# Patient Record
Sex: FEMALE | Race: BLACK OR AFRICAN AMERICAN | NOT HISPANIC OR LATINO | Employment: FULL TIME | URBAN - METROPOLITAN AREA
[De-identification: names, ages, dates, MRNs, and addresses within clinical notes are randomized per-mention and may not be internally consistent; named-entity substitution may affect disease eponyms.]

---

## 2023-02-27 LAB
ALANINE AMINOTRANSFERASE (SGPT) (U/L) IN SER/PLAS: 32 U/L (ref 7–45)
ALBUMIN (G/DL) IN SER/PLAS: 4.6 G/DL (ref 3.4–5)
ALKALINE PHOSPHATASE (U/L) IN SER/PLAS: 56 U/L (ref 33–110)
ANION GAP IN SER/PLAS: 12 MMOL/L (ref 10–20)
ASPARTATE AMINOTRANSFERASE (SGOT) (U/L) IN SER/PLAS: 21 U/L (ref 9–39)
BILIRUBIN TOTAL (MG/DL) IN SER/PLAS: 0.4 MG/DL (ref 0–1.2)
CALCIUM (MG/DL) IN SER/PLAS: 9.8 MG/DL (ref 8.6–10.6)
CARBON DIOXIDE, TOTAL (MMOL/L) IN SER/PLAS: 32 MMOL/L (ref 21–32)
CHLORIDE (MMOL/L) IN SER/PLAS: 103 MMOL/L (ref 98–107)
CHOLESTEROL (MG/DL) IN SER/PLAS: 173 MG/DL (ref 0–199)
CHOLESTEROL IN HDL (MG/DL) IN SER/PLAS: 48.3 MG/DL
CHOLESTEROL/HDL RATIO: 3.6
CREATININE (MG/DL) IN SER/PLAS: 0.71 MG/DL (ref 0.5–1.05)
ERYTHROCYTE DISTRIBUTION WIDTH (RATIO) BY AUTOMATED COUNT: 13.2 % (ref 11.5–14.5)
ERYTHROCYTE MEAN CORPUSCULAR HEMOGLOBIN CONCENTRATION (G/DL) BY AUTOMATED: 31.4 G/DL (ref 32–36)
ERYTHROCYTE MEAN CORPUSCULAR VOLUME (FL) BY AUTOMATED COUNT: 93 FL (ref 80–100)
ERYTHROCYTES (10*6/UL) IN BLOOD BY AUTOMATED COUNT: 4.37 X10E12/L (ref 4–5.2)
ESTIMATED AVERAGE GLUCOSE FOR HBA1C: 103 MG/DL
GFR FEMALE: >90 ML/MIN/1.73M2
GLUCOSE (MG/DL) IN SER/PLAS: 111 MG/DL (ref 74–99)
HEMATOCRIT (%) IN BLOOD BY AUTOMATED COUNT: 40.7 % (ref 36–46)
HEMOGLOBIN (G/DL) IN BLOOD: 12.8 G/DL (ref 12–16)
HEMOGLOBIN A1C/HEMOGLOBIN TOTAL IN BLOOD: 5.2 %
LDL: 96 MG/DL (ref 0–99)
LEUKOCYTES (10*3/UL) IN BLOOD BY AUTOMATED COUNT: 7.5 X10E9/L (ref 4.4–11.3)
NRBC (PER 100 WBCS) BY AUTOMATED COUNT: 0 /100 WBC (ref 0–0)
PLATELETS (10*3/UL) IN BLOOD AUTOMATED COUNT: 351 X10E9/L (ref 150–450)
POTASSIUM (MMOL/L) IN SER/PLAS: 4 MMOL/L (ref 3.5–5.3)
PROTEIN TOTAL: 7.4 G/DL (ref 6.4–8.2)
SODIUM (MMOL/L) IN SER/PLAS: 143 MMOL/L (ref 136–145)
THYROTROPIN (MIU/L) IN SER/PLAS BY DETECTION LIMIT <= 0.05 MIU/L: 2.38 MIU/L (ref 0.44–3.98)
TRIGLYCERIDE (MG/DL) IN SER/PLAS: 145 MG/DL (ref 0–149)
UREA NITROGEN (MG/DL) IN SER/PLAS: 10 MG/DL (ref 6–23)
VLDL: 29 MG/DL (ref 0–40)

## 2023-05-23 ENCOUNTER — OFFICE VISIT (OUTPATIENT)
Dept: PRIMARY CARE | Facility: CLINIC | Age: 43
End: 2023-05-23
Payer: COMMERCIAL

## 2023-05-23 VITALS
BODY MASS INDEX: 50.02 KG/M2 | HEIGHT: 64 IN | HEART RATE: 85 BPM | OXYGEN SATURATION: 98 % | RESPIRATION RATE: 16 BRPM | WEIGHT: 293 LBS | SYSTOLIC BLOOD PRESSURE: 135 MMHG | TEMPERATURE: 98.5 F | DIASTOLIC BLOOD PRESSURE: 77 MMHG

## 2023-05-23 DIAGNOSIS — E66.01 MORBID OBESITY (MULTI): Primary | ICD-10-CM

## 2023-05-23 DIAGNOSIS — M46.1 SACROILIITIS (CMS-HCC): ICD-10-CM

## 2023-05-23 DIAGNOSIS — I10 PRIMARY HYPERTENSION: ICD-10-CM

## 2023-05-23 DIAGNOSIS — E78.49 OTHER HYPERLIPIDEMIA: ICD-10-CM

## 2023-05-23 DIAGNOSIS — I89.0 CHRONIC ACQUIRED LYMPHEDEMA: ICD-10-CM

## 2023-05-23 PROCEDURE — 3075F SYST BP GE 130 - 139MM HG: CPT | Performed by: STUDENT IN AN ORGANIZED HEALTH CARE EDUCATION/TRAINING PROGRAM

## 2023-05-23 PROCEDURE — 3078F DIAST BP <80 MM HG: CPT | Performed by: STUDENT IN AN ORGANIZED HEALTH CARE EDUCATION/TRAINING PROGRAM

## 2023-05-23 PROCEDURE — 99214 OFFICE O/P EST MOD 30 MIN: CPT | Performed by: STUDENT IN AN ORGANIZED HEALTH CARE EDUCATION/TRAINING PROGRAM

## 2023-05-23 RX ORDER — LOSARTAN POTASSIUM 50 MG/1
1 TABLET ORAL DAILY
COMMUNITY
Start: 2021-07-12 | End: 2023-05-23 | Stop reason: SDUPTHER

## 2023-05-23 RX ORDER — SEMAGLUTIDE 0.25 MG/.5ML
0.25 INJECTION, SOLUTION SUBCUTANEOUS
Qty: 2 ML | Refills: 0 | Status: SHIPPED | OUTPATIENT
Start: 2023-05-23 | End: 2023-07-26

## 2023-05-23 RX ORDER — CHLORTHALIDONE 25 MG/1
25 TABLET ORAL DAILY
Qty: 30 TABLET | Refills: 0 | Status: SHIPPED | OUTPATIENT
Start: 2023-05-23 | End: 2023-06-15

## 2023-05-23 RX ORDER — LOSARTAN POTASSIUM 50 MG/1
50 TABLET ORAL DAILY
Qty: 90 TABLET | Refills: 0 | Status: SHIPPED | OUTPATIENT
Start: 2023-05-23 | End: 2023-06-16 | Stop reason: SDUPTHER

## 2023-05-23 RX ORDER — HYDROCHLOROTHIAZIDE 50 MG/1
1 TABLET ORAL DAILY
COMMUNITY
Start: 2021-09-21 | End: 2023-05-23

## 2023-05-23 ASSESSMENT — ENCOUNTER SYMPTOMS
DYSURIA: 0
ACTIVITY CHANGE: 0
NAUSEA: 0
ABDOMINAL PAIN: 0
UNEXPECTED WEIGHT CHANGE: 1
WEAKNESS: 0
WHEEZING: 0
SHORTNESS OF BREATH: 0
VOMITING: 0
NUMBNESS: 0
HEMATURIA: 0
CONSTIPATION: 0
SPEECH DIFFICULTY: 0
COUGH: 0
DIZZINESS: 0
FEVER: 0

## 2023-05-23 NOTE — PROGRESS NOTES
"Subjective   Patient ID: Stan Bee is a 42 y.o. female who presents for Follow-up.  HPI  Ms. Bee is here for a follow-up.  Reports increasing weight gain.  Review of Systems   Constitutional:  Positive for unexpected weight change. Negative for activity change and fever.   HENT:  Negative for congestion.    Respiratory:  Negative for cough, shortness of breath and wheezing.    Cardiovascular:  Positive for leg swelling. Negative for chest pain.   Gastrointestinal:  Negative for abdominal pain, constipation, nausea and vomiting.   Endocrine: Negative for cold intolerance.   Genitourinary:  Negative for dysuria, hematuria and urgency.   Neurological:  Negative for dizziness, speech difficulty, weakness and numbness.   Psychiatric/Behavioral:  Negative for self-injury and suicidal ideas.        Objective   Visit Vitals  /77   Pulse 85   Temp 36.9 °C (98.5 °F)   Resp 16   Ht 1.626 m (5' 4\")   Wt 148 kg (327 lb)   SpO2 98%   BMI 56.13 kg/m²   Smoking Status Never   BSA 2.59 m²      Physical Exam  Constitutional:       Appearance: Normal appearance.   HENT:      Head: Normocephalic and atraumatic.      Nose: Nose normal.      Mouth/Throat:      Mouth: Mucous membranes are moist.   Eyes:      Conjunctiva/sclera: Conjunctivae normal.      Pupils: Pupils are equal, round, and reactive to light.   Cardiovascular:      Rate and Rhythm: Normal rate and regular rhythm.      Pulses: Normal pulses.      Heart sounds: Normal heart sounds.   Pulmonary:      Effort: Pulmonary effort is normal.      Breath sounds: Normal breath sounds.   Musculoskeletal:         General: Normal range of motion.      Cervical back: Neck supple.   Skin:     General: Skin is warm.   Neurological:      General: No focal deficit present.      Mental Status: She is alert and oriented to person, place, and time.   Psychiatric:         Mood and Affect: Mood normal.         Behavior: Behavior normal.         Thought Content: Thought content " normal.         Judgment: Judgment normal.         Assessment/Plan          Problem List Items Addressed This Visit          Other    Other hyperlipidemia    Overview     The 10-year ASCVD risk score (Rubi RUIZ, et al., 2019) is: 2.9%    Values used to calculate the score:      Age: 42 years      Sex: Female      Is Non- : Yes      Diabetic: No      Tobacco smoker: No      Systolic Blood Pressure: 135 mmHg      Is BP treated: Yes      HDL Cholesterol: 48.3 mg/dL      Total Cholesterol: 173 mg/dL          Other Visit Diagnoses       Morbid obesity (CMS/HCC)    -  Primary    Relevant Medications    semaglutide, weight loss, (Wegovy) 0.25 mg/0.5 mL pen injector    Chronic acquired lymphedema        Relevant Orders    Referral to Physical Therapy    Primary hypertension        Relevant Medications    losartan (Cozaar) 50 mg tablet    chlorthalidone (Hygroton) 25 mg tablet    Sacroiliitis (CMS/HCC)        Relevant Orders    Referral to Physical Therapy    Vitamin B12           Patient's blood pressure is within normal limits.  Advised to continue losartan 50 mg and chlorthalidone 25 mg  Advise lifestyle modification.  Patient would like to pursue pharmacological options.  Discussed GLP-1 agonist, Adipex etc. would like to go ahead with GLP-1 agonist-Wegovy.  Benefits and risks including medullary thyroid cancer, acute pancreatitis and kidney injury discussed.  Verbalizes understanding and would like to proceed.  We will send in medications.  Also complains of increasing back pain.  X-ray shows sacroiliitis-advise physical therapy.  Agreeable to plan.

## 2023-06-05 DIAGNOSIS — J30.2 SEASONAL ALLERGIES: ICD-10-CM

## 2023-06-05 DIAGNOSIS — E66.01 MORBID OBESITY (MULTI): Primary | ICD-10-CM

## 2023-06-09 DIAGNOSIS — R53.83 OTHER FATIGUE: Primary | ICD-10-CM

## 2023-06-09 PROBLEM — E78.49 OTHER HYPERLIPIDEMIA: Status: ACTIVE | Noted: 2023-06-09

## 2023-06-09 RX ORDER — CETIRIZINE HYDROCHLORIDE 10 MG/1
10 TABLET ORAL DAILY
Qty: 30 TABLET | Refills: 1 | Status: SHIPPED | OUTPATIENT
Start: 2023-06-09 | End: 2023-07-03

## 2023-06-09 RX ORDER — DULAGLUTIDE 0.75 MG/.5ML
0.75 INJECTION, SOLUTION SUBCUTANEOUS
Qty: 2 ML | Refills: 1 | Status: SHIPPED | OUTPATIENT
Start: 2023-06-09 | End: 2023-07-03 | Stop reason: ALTCHOICE

## 2023-06-13 LAB
ALANINE AMINOTRANSFERASE (SGPT) (U/L) IN SER/PLAS: 42 U/L (ref 7–45)
ALBUMIN (G/DL) IN SER/PLAS: 4.5 G/DL (ref 3.4–5)
ALKALINE PHOSPHATASE (U/L) IN SER/PLAS: 48 U/L (ref 33–110)
ANION GAP IN SER/PLAS: 11 MMOL/L (ref 10–20)
ASPARTATE AMINOTRANSFERASE (SGOT) (U/L) IN SER/PLAS: 21 U/L (ref 9–39)
BILIRUBIN TOTAL (MG/DL) IN SER/PLAS: 0.3 MG/DL (ref 0–1.2)
CALCIUM (MG/DL) IN SER/PLAS: 10.2 MG/DL (ref 8.6–10.3)
CARBON DIOXIDE, TOTAL (MMOL/L) IN SER/PLAS: 34 MMOL/L (ref 21–32)
CHLORIDE (MMOL/L) IN SER/PLAS: 99 MMOL/L (ref 98–107)
CHOLESTEROL (MG/DL) IN SER/PLAS: 200 MG/DL (ref 0–199)
CHOLESTEROL IN HDL (MG/DL) IN SER/PLAS: 63.9 MG/DL
CHOLESTEROL/HDL RATIO: 3.1
CREATININE (MG/DL) IN SER/PLAS: 0.79 MG/DL (ref 0.5–1.05)
ERYTHROCYTE DISTRIBUTION WIDTH (RATIO) BY AUTOMATED COUNT: 13.3 % (ref 11.5–14.5)
ERYTHROCYTE MEAN CORPUSCULAR HEMOGLOBIN CONCENTRATION (G/DL) BY AUTOMATED: 31.9 G/DL (ref 32–36)
ERYTHROCYTE MEAN CORPUSCULAR VOLUME (FL) BY AUTOMATED COUNT: 94 FL (ref 80–100)
ERYTHROCYTES (10*6/UL) IN BLOOD BY AUTOMATED COUNT: 4.32 X10E12/L (ref 4–5.2)
GFR FEMALE: >90 ML/MIN/1.73M2
GLUCOSE (MG/DL) IN SER/PLAS: 91 MG/DL (ref 74–99)
HEMATOCRIT (%) IN BLOOD BY AUTOMATED COUNT: 40.5 % (ref 36–46)
HEMOGLOBIN (G/DL) IN BLOOD: 12.9 G/DL (ref 12–16)
LDL: 114 MG/DL (ref 0–99)
LEUKOCYTES (10*3/UL) IN BLOOD BY AUTOMATED COUNT: 9 X10E9/L (ref 4.4–11.3)
NRBC (PER 100 WBCS) BY AUTOMATED COUNT: 0 /100 WBC (ref 0–0)
PLATELETS (10*3/UL) IN BLOOD AUTOMATED COUNT: 325 X10E9/L (ref 150–450)
POTASSIUM (MMOL/L) IN SER/PLAS: 3.3 MMOL/L (ref 3.5–5.3)
PROTEIN TOTAL: 7.9 G/DL (ref 6.4–8.2)
SODIUM (MMOL/L) IN SER/PLAS: 141 MMOL/L (ref 136–145)
THYROTROPIN (MIU/L) IN SER/PLAS BY DETECTION LIMIT <= 0.05 MIU/L: 3.37 MIU/L (ref 0.44–3.98)
TRIGLYCERIDE (MG/DL) IN SER/PLAS: 110 MG/DL (ref 0–149)
UREA NITROGEN (MG/DL) IN SER/PLAS: 16 MG/DL (ref 6–23)
VLDL: 22 MG/DL (ref 0–40)

## 2023-06-15 DIAGNOSIS — I10 PRIMARY HYPERTENSION: ICD-10-CM

## 2023-06-15 RX ORDER — CHLORTHALIDONE 25 MG/1
TABLET ORAL
Qty: 30 TABLET | Refills: 0 | Status: SHIPPED | OUTPATIENT
Start: 2023-06-15 | End: 2023-06-16 | Stop reason: SINTOL

## 2023-06-16 ENCOUNTER — TELEPHONE (OUTPATIENT)
Dept: PRIMARY CARE | Facility: CLINIC | Age: 43
End: 2023-06-16
Payer: COMMERCIAL

## 2023-06-16 DIAGNOSIS — I10 PRIMARY HYPERTENSION: ICD-10-CM

## 2023-06-16 RX ORDER — LOSARTAN POTASSIUM 50 MG/1
50 TABLET ORAL 2 TIMES DAILY
Qty: 180 TABLET | Refills: 0 | Status: SHIPPED | OUTPATIENT
Start: 2023-06-16 | End: 2023-06-27 | Stop reason: SDUPTHER

## 2023-06-16 RX ORDER — CHLORTHALIDONE 25 MG/1
12.5 TABLET ORAL DAILY
Qty: 30 TABLET | Refills: 1 | Status: SHIPPED | OUTPATIENT
Start: 2023-06-16 | End: 2023-07-03 | Stop reason: SDUPTHER

## 2023-06-16 RX ORDER — POTASSIUM CHLORIDE 750 MG/1
10 TABLET, FILM COATED, EXTENDED RELEASE ORAL DAILY
Qty: 10 TABLET | Refills: 0 | Status: SHIPPED | OUTPATIENT
Start: 2023-06-16 | End: 2023-07-03 | Stop reason: SDUPTHER

## 2023-06-16 NOTE — TELEPHONE ENCOUNTER
Result Communication    Resulted Orders   Lipid Panel   Result Value Ref Range    Cholesterol 200 (H) 0 - 199 mg/dL      Comment:      .      AGE      DESIRABLE   BORDERLINE HIGH   HIGH     0-19 Y     0 - 169       170 - 199     >/= 200    20-24 Y     0 - 189       190 - 224     >/= 225         >24 Y     0 - 199       200 - 239     >/= 240   **All ranges are based on fasting samples. Specific   therapeutic targets will vary based on patient-specific   cardiac risk.  .   Pediatric guidelines reference:Pediatrics 2011, 128(S5).   Adult guidelines reference: NCEP ATPIII Guidelines,     SOLO 2001, 258:2486-97  .   Venipuncture immediately after or during the    administration of Metamizole may lead to falsely   low results. Testing should be performed immediately   prior to Metamizole dosing.      HDL 63.9 mg/dL      Comment:      .      AGE      VERY LOW   LOW     NORMAL    HIGH       0-19 Y       < 35   < 40     40-45     ----    20-24 Y       ----   < 40       >45     ----      >24 Y       ----   < 40     40-60      >60  .      Cholesterol/HDL Ratio 3.1       Comment:      REF VALUES  DESIRABLE  < 3.4  HIGH RISK  > 5.0       (H) 0 - 99 mg/dL      Comment:      .                           NEAR      BORD      AGE      DESIRABLE  OPTIMAL    HIGH     HIGH     VERY HIGH     0-19 Y     0 - 109     ---    110-129   >/= 130     ----    20-24 Y     0 - 119     ---    120-159   >/= 160     ----      >24 Y     0 -  99   100-129  130-159   160-189     >/=190  .      VLDL 22 0 - 40 mg/dL    Triglycerides 110 0 - 149 mg/dL      Comment:      .      AGE      DESIRABLE   BORDERLINE HIGH   HIGH     VERY HIGH   0 D-90 D    19 - 174         ----         ----        ----  91 D- 9 Y     0 -  74        75 -  99     >/= 100      ----    10-19 Y     0 -  89        90 - 129     >/= 130      ----    20-24 Y     0 - 114       115 - 149     >/= 150      ----         >24 Y     0 - 149       150 - 199    200- 499    >/= 500  .    Venipuncture immediately after or during the    administration of Metamizole may lead to falsely   low results. Testing should be performed immediately   prior to Metamizole dosing.     TSH with reflex to Free T4 if abnormal   Result Value Ref Range    TSH 3.37 0.44 - 3.98 mIU/L      Comment:       TSH testing is performed using different testing    methodology at [facility] than at other    [facility]. Direct result comparisons should    only be made within the same method.     Comprehensive Metabolic Panel   Result Value Ref Range    Glucose 91 74 - 99 mg/dL    Sodium 141 136 - 145 mmol/L    Potassium 3.3 (L) 3.5 - 5.3 mmol/L    Chloride 99 98 - 107 mmol/L    Bicarbonate 34 (H) 21 - 32 mmol/L    Anion Gap 11 10 - 20 mmol/L    Urea Nitrogen 16 6 - 23 mg/dL    Creatinine 0.79 0.50 - 1.05 mg/dL    GFR Female >90 >90 mL/min/1.73m2      Comment:       CALCULATIONS OF ESTIMATED GFR ARE PERFORMED   USING THE 2021 CKD-EPI STUDY REFIT EQUATION   WITHOUT THE RACE VARIABLE FOR THE IDMS-TRACEABLE   CREATININE METHODS.    https://jasn.asnjournals.org/content/early/2021/09/22/ASN.8564292096      Calcium 10.2 8.6 - 10.3 mg/dL    Albumin 4.5 3.4 - 5.0 g/dL    Alkaline Phosphatase 48 33 - 110 U/L    Total Protein 7.9 6.4 - 8.2 g/dL    AST 21 9 - 39 U/L    Total Bilirubin 0.3 0.0 - 1.2 mg/dL    ALT (SGPT) 42 7 - 45 U/L      Comment:       Patients treated with Sulfasalazine may generate    falsely decreased results for ALT.     CBC   Result Value Ref Range    WBC 9.0 4.4 - 11.3 x10E9/L    nRBC 0.0 0.0 - 0.0 /100 WBC    RBC 4.32 4.00 - 5.20 x10E12/L    Hemoglobin 12.9 12.0 - 16.0 g/dL    Hematocrit 40.5 36.0 - 46.0 %    MCV 94 80 - 100 fL    MCHC 31.9 (L) 32.0 - 36.0 g/dL    Platelets 325 150 - 450 x10E9/L    RDW 13.3 11.5 - 14.5 %       1:04 PM      The 10-year ASCVD risk score (Rubi RUIZ, et al., 2019) is: 1.9%    Values used to calculate the score:      Age: 42 years      Sex: Female      Is Non-  : Yes      Diabetic: No      Tobacco smoker: No      Systolic Blood Pressure: 135 mmHg      Is BP treated: Yes      HDL Cholesterol: 63.9 mg/dL      Total Cholesterol: 200 mg/dL         Change in BP recommended    Losartan 50 mg twice daily   Chlorthalidone 12.5 mg daily   Potassium supplement for 10 days

## 2023-06-27 DIAGNOSIS — I10 PRIMARY HYPERTENSION: ICD-10-CM

## 2023-06-27 RX ORDER — LOSARTAN POTASSIUM 50 MG/1
50 TABLET ORAL 2 TIMES DAILY
Qty: 180 TABLET | Refills: 0 | Status: SHIPPED | OUTPATIENT
Start: 2023-06-27 | End: 2023-09-01 | Stop reason: SDUPTHER

## 2023-06-28 ENCOUNTER — CLINICAL SUPPORT (OUTPATIENT)
Dept: PRIMARY CARE | Facility: CLINIC | Age: 43
End: 2023-06-28
Payer: COMMERCIAL

## 2023-06-28 VITALS
BODY MASS INDEX: 55.96 KG/M2 | SYSTOLIC BLOOD PRESSURE: 160 MMHG | HEART RATE: 67 BPM | WEIGHT: 293 LBS | DIASTOLIC BLOOD PRESSURE: 90 MMHG

## 2023-07-03 DIAGNOSIS — E66.01 MORBID OBESITY (MULTI): ICD-10-CM

## 2023-07-03 DIAGNOSIS — I10 PRIMARY HYPERTENSION: ICD-10-CM

## 2023-07-03 DIAGNOSIS — J30.2 SEASONAL ALLERGIES: ICD-10-CM

## 2023-07-03 RX ORDER — POTASSIUM CHLORIDE 750 MG/1
10 TABLET, FILM COATED, EXTENDED RELEASE ORAL DAILY
Qty: 30 TABLET | Refills: 2 | Status: SHIPPED | OUTPATIENT
Start: 2023-07-03 | End: 2023-07-27

## 2023-07-03 RX ORDER — CHLORTHALIDONE 25 MG/1
25 TABLET ORAL DAILY
Qty: 30 TABLET | Refills: 2 | Status: SHIPPED | OUTPATIENT
Start: 2023-07-03 | End: 2023-07-11

## 2023-07-03 RX ORDER — CETIRIZINE HYDROCHLORIDE 10 MG/1
TABLET ORAL
Qty: 30 TABLET | Refills: 1 | Status: SHIPPED | OUTPATIENT
Start: 2023-07-03 | End: 2023-07-27

## 2023-07-03 RX ORDER — DULAGLUTIDE 1.5 MG/.5ML
1.5 INJECTION, SOLUTION SUBCUTANEOUS
Qty: 2 ML | Refills: 1 | Status: SHIPPED | OUTPATIENT
Start: 2023-07-03 | End: 2023-07-26 | Stop reason: ALTCHOICE

## 2023-07-03 NOTE — PROGRESS NOTES
Reports swelling back up     Losartan 50 twice daily   Chlorthalidone 25 mg daily  Potassium 10 daily   Trulicity 1.5 mg weekly    BMP in 10 days

## 2023-07-09 DIAGNOSIS — I10 PRIMARY HYPERTENSION: ICD-10-CM

## 2023-07-11 RX ORDER — CHLORTHALIDONE 25 MG/1
TABLET ORAL
Qty: 15 TABLET | Refills: 3 | Status: SHIPPED | OUTPATIENT
Start: 2023-07-11 | End: 2023-09-01 | Stop reason: SDUPTHER

## 2023-07-26 ENCOUNTER — LAB (OUTPATIENT)
Dept: LAB | Facility: LAB | Age: 43
End: 2023-07-26
Payer: COMMERCIAL

## 2023-07-26 DIAGNOSIS — R53.83 OTHER FATIGUE: ICD-10-CM

## 2023-07-26 DIAGNOSIS — E66.01 MORBID OBESITY (MULTI): ICD-10-CM

## 2023-07-26 DIAGNOSIS — M46.1 SACROILIITIS (CMS-HCC): ICD-10-CM

## 2023-07-26 DIAGNOSIS — I10 PRIMARY HYPERTENSION: ICD-10-CM

## 2023-07-26 LAB
ACTIVATED PARTIAL THROMBOPLASTIN TIME IN PPP BY COAGULATION ASSAY: NORMAL
ALANINE AMINOTRANSFERASE (SGPT) (U/L) IN SER/PLAS: 34 U/L (ref 7–45)
ALBUMIN (G/DL) IN SER/PLAS: 4.9 G/DL (ref 3.4–5)
ALKALINE PHOSPHATASE (U/L) IN SER/PLAS: 50 U/L (ref 33–110)
AMPHETAMINE (PRESENCE) IN URINE BY SCREEN METHOD: NORMAL
ANION GAP IN SER/PLAS: 16 MMOL/L (ref 10–20)
ANION GAP IN SER/PLAS: 16 MMOL/L (ref 10–20)
ASPARTATE AMINOTRANSFERASE (SGOT) (U/L) IN SER/PLAS: 25 U/L (ref 9–39)
BARBITURATES PRESENCE IN URINE BY SCREEN METHOD: NORMAL
BASOPHILS (10*3/UL) IN BLOOD BY AUTOMATED COUNT: 0.1 X10E9/L (ref 0–0.1)
BASOPHILS/100 LEUKOCYTES IN BLOOD BY AUTOMATED COUNT: 1.2 % (ref 0–2)
BENZODIAZEPINE (PRESENCE) IN URINE BY SCREEN METHOD: NORMAL
BILIRUBIN TOTAL (MG/DL) IN SER/PLAS: 0.7 MG/DL (ref 0–1.2)
C PEPTIDE (NG/ML) IN SER/PLAS: 5.6 NG/ML (ref 0.7–3.9)
CALCIDIOL (25 OH VITAMIN D3) (NG/ML) IN SER/PLAS: 12 NG/ML
CALCIUM (MG/DL) IN SER/PLAS: 10 MG/DL (ref 8.6–10.6)
CALCIUM (MG/DL) IN SER/PLAS: 10.1 MG/DL (ref 8.6–10.6)
CANNABINOIDS IN URINE BY SCREEN METHOD: NORMAL
CARBON DIOXIDE, TOTAL (MMOL/L) IN SER/PLAS: 28 MMOL/L (ref 21–32)
CARBON DIOXIDE, TOTAL (MMOL/L) IN SER/PLAS: 28 MMOL/L (ref 21–32)
CHLORIDE (MMOL/L) IN SER/PLAS: 100 MMOL/L (ref 98–107)
CHLORIDE (MMOL/L) IN SER/PLAS: 100 MMOL/L (ref 98–107)
CHOLESTEROL (MG/DL) IN SER/PLAS: 163 MG/DL (ref 0–199)
CHOLESTEROL IN HDL (MG/DL) IN SER/PLAS: 47.4 MG/DL
CHOLESTEROL/HDL RATIO: 3.4
COBALAMIN (VITAMIN B12) (PG/ML) IN SER/PLAS: 706 PG/ML (ref 211–911)
COCAINE (PRESENCE) IN URINE BY SCREEN METHOD: NORMAL
CREATININE (MG/DL) IN SER/PLAS: 0.75 MG/DL (ref 0.5–1.05)
CREATININE (MG/DL) IN SER/PLAS: 0.75 MG/DL (ref 0.5–1.05)
DRUG SCREEN COMMENT URINE: NORMAL
EOSINOPHILS (10*3/UL) IN BLOOD BY AUTOMATED COUNT: 0.44 X10E9/L (ref 0–0.7)
EOSINOPHILS/100 LEUKOCYTES IN BLOOD BY AUTOMATED COUNT: 5.1 % (ref 0–6)
ERYTHROCYTE DISTRIBUTION WIDTH (RATIO) BY AUTOMATED COUNT: 12.7 % (ref 11.5–14.5)
ERYTHROCYTE MEAN CORPUSCULAR HEMOGLOBIN CONCENTRATION (G/DL) BY AUTOMATED: 33.5 G/DL (ref 32–36)
ERYTHROCYTE MEAN CORPUSCULAR VOLUME (FL) BY AUTOMATED COUNT: 89 FL (ref 80–100)
ERYTHROCYTES (10*6/UL) IN BLOOD BY AUTOMATED COUNT: 4.74 X10E12/L (ref 4–5.2)
ESTIMATED AVERAGE GLUCOSE FOR HBA1C: 100 MG/DL
FENTANYL URINE: NORMAL
FERRITIN (UG/LL) IN SER/PLAS: 267 UG/L (ref 8–150)
FOLATE (NG/ML) IN SER/PLAS: >24 NG/ML
GFR FEMALE: >90 ML/MIN/1.73M2
GFR FEMALE: >90 ML/MIN/1.73M2
GLUCOSE (MG/DL) IN SER/PLAS: 90 MG/DL (ref 74–99)
GLUCOSE (MG/DL) IN SER/PLAS: 91 MG/DL (ref 74–99)
HEMATOCRIT (%) IN BLOOD BY AUTOMATED COUNT: 42.1 % (ref 36–46)
HEMOGLOBIN (G/DL) IN BLOOD: 14.1 G/DL (ref 12–16)
HEMOGLOBIN A1C/HEMOGLOBIN TOTAL IN BLOOD: 5.1 %
IMMATURE GRANULOCYTES/100 LEUKOCYTES IN BLOOD BY AUTOMATED COUNT: 0.2 % (ref 0–0.9)
INR IN PPP BY COAGULATION ASSAY: NORMAL
IRON (UG/DL) IN SER/PLAS: 99 UG/DL (ref 35–150)
IRON BINDING CAPACITY (UG/DL) IN SER/PLAS: 400 UG/DL (ref 240–445)
IRON SATURATION (%) IN SER/PLAS: 25 % (ref 25–45)
LDL: 94 MG/DL (ref 0–99)
LEUKOCYTES (10*3/UL) IN BLOOD BY AUTOMATED COUNT: 8.6 X10E9/L (ref 4.4–11.3)
LYMPHOCYTES (10*3/UL) IN BLOOD BY AUTOMATED COUNT: 3.88 X10E9/L (ref 1.2–4.8)
LYMPHOCYTES/100 LEUKOCYTES IN BLOOD BY AUTOMATED COUNT: 45.1 % (ref 13–44)
METHADONE (PRESENCE) IN URINE BY SCREEN METHOD: NORMAL
MONOCYTES (10*3/UL) IN BLOOD BY AUTOMATED COUNT: 0.71 X10E9/L (ref 0.1–1)
MONOCYTES/100 LEUKOCYTES IN BLOOD BY AUTOMATED COUNT: 8.2 % (ref 2–10)
NEUTROPHILS (10*3/UL) IN BLOOD BY AUTOMATED COUNT: 3.46 X10E9/L (ref 1.2–7.7)
NEUTROPHILS/100 LEUKOCYTES IN BLOOD BY AUTOMATED COUNT: 40.2 % (ref 40–80)
NRBC (PER 100 WBCS) BY AUTOMATED COUNT: 0 /100 WBC (ref 0–0)
OPIATES (PRESENCE) IN URINE BY SCREEN METHOD: NORMAL
OXYCODONE (PRESENCE) IN URINE BY SCREEN METHOD: NORMAL
PARATHYRIN INTACT (PG/ML) IN SER/PLAS: 82.8 PG/ML (ref 18.5–88)
PHENCYCLIDINE (PRESENCE) IN URINE BY SCREEN METHOD: NORMAL
PLATELETS (10*3/UL) IN BLOOD AUTOMATED COUNT: 313 X10E9/L (ref 150–450)
POTASSIUM (MMOL/L) IN SER/PLAS: 3.1 MMOL/L (ref 3.5–5.3)
POTASSIUM (MMOL/L) IN SER/PLAS: 3.1 MMOL/L (ref 3.5–5.3)
PROTEIN TOTAL: 8.4 G/DL (ref 6.4–8.2)
PROTHROMBIN TIME (PT) IN PPP BY COAGULATION ASSAY: NORMAL
SODIUM (MMOL/L) IN SER/PLAS: 141 MMOL/L (ref 136–145)
SODIUM (MMOL/L) IN SER/PLAS: 141 MMOL/L (ref 136–145)
THYROTROPIN (MIU/L) IN SER/PLAS BY DETECTION LIMIT <= 0.05 MIU/L: 1.56 MIU/L (ref 0.44–3.98)
TRIGLYCERIDE (MG/DL) IN SER/PLAS: 106 MG/DL (ref 0–149)
UREA NITROGEN (MG/DL) IN SER/PLAS: 14 MG/DL (ref 6–23)
UREA NITROGEN (MG/DL) IN SER/PLAS: 15 MG/DL (ref 6–23)
VLDL: 21 MG/DL (ref 0–40)

## 2023-07-26 PROCEDURE — 83550 IRON BINDING TEST: CPT

## 2023-07-26 PROCEDURE — 82306 VITAMIN D 25 HYDROXY: CPT

## 2023-07-26 PROCEDURE — 82728 ASSAY OF FERRITIN: CPT

## 2023-07-26 PROCEDURE — 80048 BASIC METABOLIC PNL TOTAL CA: CPT

## 2023-07-26 PROCEDURE — 83540 ASSAY OF IRON: CPT

## 2023-07-26 PROCEDURE — 82607 VITAMIN B-12: CPT

## 2023-07-26 PROCEDURE — 36415 COLL VENOUS BLD VENIPUNCTURE: CPT

## 2023-07-27 DIAGNOSIS — I10 PRIMARY HYPERTENSION: ICD-10-CM

## 2023-07-27 DIAGNOSIS — J30.2 SEASONAL ALLERGIES: ICD-10-CM

## 2023-07-27 LAB
ACTIVATED PARTIAL THROMBOPLASTIN TIME IN PPP BY COAGULATION ASSAY: 33 SEC (ref 27–38)
INR IN PPP BY COAGULATION ASSAY: 1.1 (ref 0.9–1.1)
PROTHROMBIN TIME (PT) IN PPP BY COAGULATION ASSAY: 12.5 SEC (ref 9.8–12.8)

## 2023-07-27 RX ORDER — CETIRIZINE HYDROCHLORIDE 10 MG/1
TABLET ORAL
Qty: 30 TABLET | Refills: 1 | Status: SHIPPED | OUTPATIENT
Start: 2023-07-27 | End: 2023-08-21

## 2023-07-27 RX ORDER — POTASSIUM CHLORIDE 750 MG/1
20 TABLET, FILM COATED, EXTENDED RELEASE ORAL DAILY
Qty: 30 TABLET | Refills: 0 | Status: SHIPPED | OUTPATIENT
Start: 2023-07-27 | End: 2023-08-07

## 2023-07-29 LAB
COPPER: 155.7 UG/DL (ref 80–155)
ZINC,SERUM OR PLASMA: 86.3 UG/DL (ref 60–120)

## 2023-07-31 LAB
COTININE BLOOD QUANTITATIVE: 81 NG/ML
NICOTINE BLOOD QUANTITATIVE: 7 NG/ML
VITAMIN B1, WHOLE BLOOD: 127 NMOL/L (ref 70–180)

## 2023-08-03 ENCOUNTER — TELEPHONE (OUTPATIENT)
Dept: PRIMARY CARE | Facility: CLINIC | Age: 43
End: 2023-08-03
Payer: COMMERCIAL

## 2023-08-03 NOTE — TELEPHONE ENCOUNTER
Per pharmacy patient has not filled statin therapy at [facility] in the past 180 days , pharmacy is requesting a new prescription for statin therapy  if appropriate

## 2023-08-06 DIAGNOSIS — I10 PRIMARY HYPERTENSION: ICD-10-CM

## 2023-08-07 RX ORDER — POTASSIUM CHLORIDE 750 MG/1
10 TABLET, FILM COATED, EXTENDED RELEASE ORAL DAILY
Qty: 30 TABLET | Refills: 1 | Status: SHIPPED | OUTPATIENT
Start: 2023-08-07 | End: 2023-09-01 | Stop reason: SDUPTHER

## 2023-08-21 DIAGNOSIS — J30.2 SEASONAL ALLERGIES: ICD-10-CM

## 2023-08-21 RX ORDER — CETIRIZINE HYDROCHLORIDE 10 MG/1
TABLET ORAL
Qty: 30 TABLET | Refills: 1 | Status: SHIPPED | OUTPATIENT
Start: 2023-08-21 | End: 2023-09-12

## 2023-08-28 PROBLEM — L03.115 CELLULITIS OF FOOT, RIGHT: Status: ACTIVE | Noted: 2023-08-28

## 2023-08-28 PROBLEM — S16.1XXA CERVICAL STRAIN: Status: ACTIVE | Noted: 2023-08-28

## 2023-08-28 PROBLEM — M54.50 LOW BACK PAIN: Status: ACTIVE | Noted: 2023-08-28

## 2023-08-28 PROBLEM — R10.819 LOWER ABDOMINAL TENDERNESS: Status: ACTIVE | Noted: 2023-08-28

## 2023-08-28 PROBLEM — J34.89 SINUS PRESSURE: Status: ACTIVE | Noted: 2023-08-28

## 2023-08-28 PROBLEM — M72.2 PLANTAR FASCIITIS, BILATERAL: Status: ACTIVE | Noted: 2023-08-28

## 2023-08-28 PROBLEM — Z78.9 KNOWN MEDICAL PROBLEMS: Status: ACTIVE | Noted: 2023-08-28

## 2023-08-28 PROBLEM — M79.2 NERVE PAIN: Status: ACTIVE | Noted: 2023-08-28

## 2023-08-28 PROBLEM — M79.673 FOOT PAIN: Status: ACTIVE | Noted: 2023-08-28

## 2023-08-28 PROBLEM — R21 RASH AND OTHER NONSPECIFIC SKIN ERUPTION: Status: ACTIVE | Noted: 2021-07-28

## 2023-08-28 PROBLEM — J45.909 ASTHMA (HHS-HCC): Status: ACTIVE | Noted: 2023-08-28

## 2023-08-28 PROBLEM — M79.89 LEG SWELLING: Status: ACTIVE | Noted: 2023-08-28

## 2023-08-28 PROBLEM — M94.0 COSTOCHONDRITIS: Status: ACTIVE | Noted: 2023-08-28

## 2023-08-28 PROBLEM — S10.91XA ABRASION OF NECK: Status: ACTIVE | Noted: 2023-08-28

## 2023-08-28 PROBLEM — N92.0 SPOTTING: Status: ACTIVE | Noted: 2023-08-28

## 2023-08-28 PROBLEM — S46.912A LEFT SHOULDER STRAIN: Status: ACTIVE | Noted: 2023-08-28

## 2023-08-28 PROBLEM — R07.89 ATYPICAL CHEST PAIN: Status: ACTIVE | Noted: 2023-08-28

## 2023-08-28 PROBLEM — N89.8 VAGINAL ITCHING: Status: ACTIVE | Noted: 2023-08-28

## 2023-08-28 PROBLEM — R60.0 PEDAL EDEMA: Status: ACTIVE | Noted: 2023-08-28

## 2023-08-28 PROBLEM — Z72.89 CURRENT VAPING ON SOME DAYS: Status: ACTIVE | Noted: 2023-08-28

## 2023-08-28 PROBLEM — R53.83 FATIGUE: Status: ACTIVE | Noted: 2023-08-28

## 2023-08-28 PROBLEM — B37.49 YEAST UTI: Status: ACTIVE | Noted: 2023-08-28

## 2023-08-28 PROBLEM — N89.8 VAGINAL DISCHARGE: Status: ACTIVE | Noted: 2023-08-28

## 2023-08-28 PROBLEM — M25.511 CHRONIC RIGHT SHOULDER PAIN: Status: ACTIVE | Noted: 2018-06-13

## 2023-08-28 PROBLEM — K04.7 DENTOALVEOLAR ABSCESS: Status: ACTIVE | Noted: 2023-08-28

## 2023-08-28 PROBLEM — M76.61 ACHILLES TENDINITIS OF BOTH LOWER EXTREMITIES: Status: ACTIVE | Noted: 2023-08-28

## 2023-08-28 PROBLEM — Z20.2 TRICHOMONAS EXPOSURE: Status: ACTIVE | Noted: 2023-08-28

## 2023-08-28 PROBLEM — D50.9 IRON DEFICIENCY ANEMIA: Status: ACTIVE | Noted: 2023-08-28

## 2023-08-28 PROBLEM — L02.31 ABSCESS OF BUTTOCK, LEFT: Status: ACTIVE | Noted: 2023-08-28

## 2023-08-28 PROBLEM — S89.91XA INJURY OF RIGHT LEG: Status: ACTIVE | Noted: 2023-08-28

## 2023-08-28 PROBLEM — R51.9 FRONTAL HEADACHE: Status: ACTIVE | Noted: 2023-08-28

## 2023-08-28 PROBLEM — C84.A0 CUTANEOUS T-CELL LYMPHOMA (MULTI): Status: ACTIVE | Noted: 2023-08-28

## 2023-08-28 PROBLEM — M54.10 RADICULOPATHY: Status: ACTIVE | Noted: 2023-08-28

## 2023-08-28 PROBLEM — Z91.148 HISTORY OF MEDICATION NONCOMPLIANCE: Status: ACTIVE | Noted: 2023-08-28

## 2023-08-28 PROBLEM — N62 MACROMASTIA: Status: ACTIVE | Noted: 2023-08-28

## 2023-08-28 PROBLEM — B37.31 VAGINAL CANDIDIASIS: Status: ACTIVE | Noted: 2023-08-28

## 2023-08-28 PROBLEM — M67.88 ACHILLES TENDINOSIS: Status: ACTIVE | Noted: 2023-08-28

## 2023-08-28 PROBLEM — B35.1 ONYCHOMYCOSIS: Status: ACTIVE | Noted: 2023-08-28

## 2023-08-28 PROBLEM — L03.039 PARONYCHIA OF GREAT TOE: Status: ACTIVE | Noted: 2023-08-28

## 2023-08-28 PROBLEM — M25.551 RIGHT HIP PAIN: Status: ACTIVE | Noted: 2023-08-28

## 2023-08-28 PROBLEM — E66.01 MORBID OBESITY (MULTI): Status: ACTIVE | Noted: 2023-08-28

## 2023-08-28 PROBLEM — M25.579 ANKLE PAIN: Status: ACTIVE | Noted: 2023-08-28

## 2023-08-28 PROBLEM — K59.00 CONSTIPATION: Status: ACTIVE | Noted: 2023-08-28

## 2023-08-28 PROBLEM — M79.601 PAIN OF RIGHT UPPER EXTREMITY: Status: ACTIVE | Noted: 2023-08-28

## 2023-08-28 PROBLEM — I89.0 LYMPHEDEMA OF BOTH LOWER EXTREMITIES: Status: ACTIVE | Noted: 2023-08-28

## 2023-08-28 PROBLEM — R60.9 EDEMA: Status: ACTIVE | Noted: 2023-08-28

## 2023-08-28 PROBLEM — M54.2 NECK PAIN: Status: ACTIVE | Noted: 2023-08-28

## 2023-08-28 PROBLEM — M54.9 UPPER BACK PAIN: Status: ACTIVE | Noted: 2023-08-28

## 2023-08-28 PROBLEM — R29.898 FINGER DYSFUNCTION: Status: ACTIVE | Noted: 2023-08-28

## 2023-08-28 PROBLEM — M46.1 SACROILIITIS (CMS-HCC): Status: ACTIVE | Noted: 2023-08-28

## 2023-08-28 PROBLEM — Z98.84 BARIATRIC SURGERY STATUS: Status: ACTIVE | Noted: 2023-08-28

## 2023-08-28 PROBLEM — M25.512 LEFT SHOULDER PAIN: Status: ACTIVE | Noted: 2023-08-28

## 2023-08-28 PROBLEM — R29.898 UPPER EXTREMITY WEAKNESS: Status: ACTIVE | Noted: 2023-08-28

## 2023-08-28 PROBLEM — G89.29 CHRONIC RIGHT SHOULDER PAIN: Status: ACTIVE | Noted: 2018-06-13

## 2023-08-28 PROBLEM — R82.998 LEUKOCYTES IN URINE: Status: ACTIVE | Noted: 2023-08-28

## 2023-08-28 PROBLEM — Z87.39 H/O DEGENERATIVE DISC DISEASE: Status: ACTIVE | Noted: 2023-08-28

## 2023-08-28 PROBLEM — S29.012A UPPER BACK STRAIN: Status: ACTIVE | Noted: 2023-08-28

## 2023-08-28 PROBLEM — T25.029A BURN OF FOOT: Status: ACTIVE | Noted: 2023-08-28

## 2023-08-28 PROBLEM — S93.401A RIGHT ANKLE SPRAIN: Status: ACTIVE | Noted: 2023-08-28

## 2023-08-28 PROBLEM — G47.33 OBSTRUCTIVE SLEEP APNEA: Status: ACTIVE | Noted: 2023-08-28

## 2023-08-28 PROBLEM — J11.1 INFLUENZA: Status: ACTIVE | Noted: 2023-08-28

## 2023-08-28 PROBLEM — L30.9 DERMATITIS: Status: ACTIVE | Noted: 2023-08-28

## 2023-08-28 PROBLEM — M76.62 ACHILLES TENDINITIS OF BOTH LOWER EXTREMITIES: Status: ACTIVE | Noted: 2023-08-28

## 2023-08-28 PROBLEM — I10 HYPERTENSION: Status: ACTIVE | Noted: 2023-08-28

## 2023-08-28 PROBLEM — L72.0 CYST OF SKIN AND SUBCUTANEOUS TISSUE: Status: ACTIVE | Noted: 2023-08-28

## 2023-08-28 RX ORDER — PETROLATUM,WHITE 41 %
1 OINTMENT (GRAM) TOPICAL
COMMUNITY
Start: 2018-09-13

## 2023-08-28 RX ORDER — CLOBETASOL PROPIONATE 0.5 MG/G
1 CREAM TOPICAL 2 TIMES DAILY
COMMUNITY
Start: 2019-11-02

## 2023-08-28 RX ORDER — FUROSEMIDE 40 MG/1
1-2 TABLET ORAL DAILY PRN
COMMUNITY
Start: 2021-04-20 | End: 2023-09-01 | Stop reason: ALTCHOICE

## 2023-08-28 RX ORDER — HYDROCORTISONE 1 %
CREAM (GRAM) TOPICAL
COMMUNITY
Start: 2018-04-04

## 2023-08-28 RX ORDER — UREA 10 %
1 LOTION (ML) TOPICAL
COMMUNITY
Start: 2020-01-21

## 2023-08-28 RX ORDER — AMLODIPINE BESYLATE 5 MG/1
2.5 TABLET ORAL
COMMUNITY
Start: 2020-04-14 | End: 2023-09-01 | Stop reason: ALTCHOICE

## 2023-08-28 RX ORDER — LOSARTAN POTASSIUM 50 MG/1
1 TABLET ORAL DAILY
COMMUNITY
Start: 2021-07-12 | End: 2023-09-01 | Stop reason: ALTCHOICE

## 2023-08-28 RX ORDER — MUPIROCIN 20 MG/G
OINTMENT TOPICAL 2 TIMES DAILY
COMMUNITY
Start: 2022-04-03 | End: 2023-12-07 | Stop reason: ALTCHOICE

## 2023-08-28 RX ORDER — TRIAMCINOLONE ACETONIDE 1 MG/G
1 CREAM TOPICAL
COMMUNITY
Start: 2017-04-12 | End: 2023-09-01 | Stop reason: SDUPTHER

## 2023-08-28 RX ORDER — LOSARTAN POTASSIUM 25 MG/1
1 TABLET ORAL DAILY
COMMUNITY
End: 2023-09-01 | Stop reason: ALTCHOICE

## 2023-08-28 RX ORDER — ALBUTEROL SULFATE 90 UG/1
2 AEROSOL, METERED RESPIRATORY (INHALATION) EVERY 6 HOURS PRN
COMMUNITY
Start: 2021-09-21

## 2023-08-28 RX ORDER — HYDROCHLOROTHIAZIDE 50 MG/1
1 TABLET ORAL DAILY
COMMUNITY
Start: 2021-09-21 | End: 2023-09-01 | Stop reason: ALTCHOICE

## 2023-08-28 RX ORDER — TACROLIMUS 1 MG/G
1 OINTMENT TOPICAL
COMMUNITY
Start: 2020-02-26

## 2023-08-28 RX ORDER — DICLOFENAC SODIUM 10 MG/G
4 GEL TOPICAL EVERY 6 HOURS PRN
COMMUNITY
Start: 2022-11-19 | End: 2023-10-30

## 2023-08-28 RX ORDER — HYDROCHLOROTHIAZIDE 12.5 MG/1
1 TABLET ORAL DAILY
COMMUNITY
End: 2023-09-01 | Stop reason: ALTCHOICE

## 2023-08-28 RX ORDER — DOXYCYCLINE 100 MG/1
100 TABLET ORAL EVERY 12 HOURS
COMMUNITY
Start: 2022-10-13 | End: 2023-09-01 | Stop reason: ALTCHOICE

## 2023-08-28 RX ORDER — MELOXICAM 15 MG/1
15 TABLET ORAL DAILY
COMMUNITY
Start: 2023-07-28 | End: 2023-12-07 | Stop reason: ALTCHOICE

## 2023-08-28 RX ORDER — CETIRIZINE HYDROCHLORIDE 5 MG/1
1 TABLET ORAL DAILY
COMMUNITY
End: 2023-09-01 | Stop reason: ALTCHOICE

## 2023-08-28 RX ORDER — MELOXICAM 7.5 MG/1
7.5 TABLET ORAL DAILY
COMMUNITY
Start: 2022-11-03 | End: 2023-12-07 | Stop reason: ALTCHOICE

## 2023-08-28 RX ORDER — UREA 40 %
1 CREAM (GRAM) TOPICAL
COMMUNITY
Start: 2018-09-13

## 2023-08-28 RX ORDER — KETOCONAZOLE 20 MG/G
CREAM TOPICAL
COMMUNITY
Start: 2020-07-15

## 2023-08-28 RX ORDER — LIDOCAINE 50 MG/G
PATCH TOPICAL DAILY
COMMUNITY
End: 2023-12-03

## 2023-08-30 DIAGNOSIS — I10 PRIMARY HYPERTENSION: ICD-10-CM

## 2023-08-30 DIAGNOSIS — E66.01 MORBID OBESITY (MULTI): ICD-10-CM

## 2023-08-30 RX ORDER — DULAGLUTIDE 4.5 MG/.5ML
4.5 INJECTION, SOLUTION SUBCUTANEOUS
Qty: 2 ML | Refills: 11 | Status: SHIPPED | OUTPATIENT
Start: 2023-08-30 | End: 2024-03-15 | Stop reason: SDUPTHER

## 2023-09-01 ENCOUNTER — OFFICE VISIT (OUTPATIENT)
Dept: PRIMARY CARE | Facility: CLINIC | Age: 43
End: 2023-09-01
Payer: COMMERCIAL

## 2023-09-01 VITALS
SYSTOLIC BLOOD PRESSURE: 115 MMHG | HEART RATE: 94 BPM | RESPIRATION RATE: 17 BRPM | OXYGEN SATURATION: 96 % | DIASTOLIC BLOOD PRESSURE: 79 MMHG | BODY MASS INDEX: 50.02 KG/M2 | WEIGHT: 293 LBS | HEIGHT: 64 IN

## 2023-09-01 DIAGNOSIS — I10 PRIMARY HYPERTENSION: ICD-10-CM

## 2023-09-01 DIAGNOSIS — L30.9 ECZEMA, UNSPECIFIED TYPE: Primary | ICD-10-CM

## 2023-09-01 DIAGNOSIS — E66.01 MORBID OBESITY (MULTI): ICD-10-CM

## 2023-09-01 DIAGNOSIS — G47.33 OSA (OBSTRUCTIVE SLEEP APNEA): ICD-10-CM

## 2023-09-01 PROCEDURE — 3008F BODY MASS INDEX DOCD: CPT | Performed by: STUDENT IN AN ORGANIZED HEALTH CARE EDUCATION/TRAINING PROGRAM

## 2023-09-01 PROCEDURE — 99214 OFFICE O/P EST MOD 30 MIN: CPT | Performed by: STUDENT IN AN ORGANIZED HEALTH CARE EDUCATION/TRAINING PROGRAM

## 2023-09-01 PROCEDURE — 3074F SYST BP LT 130 MM HG: CPT | Performed by: STUDENT IN AN ORGANIZED HEALTH CARE EDUCATION/TRAINING PROGRAM

## 2023-09-01 PROCEDURE — 3078F DIAST BP <80 MM HG: CPT | Performed by: STUDENT IN AN ORGANIZED HEALTH CARE EDUCATION/TRAINING PROGRAM

## 2023-09-01 RX ORDER — CHLORTHALIDONE 25 MG/1
25 TABLET ORAL DAILY
Qty: 90 TABLET | Refills: 1 | Status: SHIPPED | OUTPATIENT
Start: 2023-09-01 | End: 2023-10-30

## 2023-09-01 RX ORDER — TRIAMCINOLONE ACETONIDE 1 MG/G
1 CREAM TOPICAL 2 TIMES DAILY
Qty: 453.6 G | Refills: 3 | Status: SHIPPED | OUTPATIENT
Start: 2023-09-01 | End: 2024-02-16 | Stop reason: WASHOUT

## 2023-09-01 RX ORDER — LOSARTAN POTASSIUM 50 MG/1
50 TABLET ORAL 2 TIMES DAILY
Qty: 180 TABLET | Refills: 0 | Status: SHIPPED | OUTPATIENT
Start: 2023-09-01 | End: 2023-10-30

## 2023-09-01 RX ORDER — POTASSIUM CHLORIDE 750 MG/1
20 TABLET, FILM COATED, EXTENDED RELEASE ORAL DAILY
Qty: 60 TABLET | Refills: 3 | Status: SHIPPED | OUTPATIENT
Start: 2023-09-01 | End: 2023-09-05

## 2023-09-01 ASSESSMENT — ENCOUNTER SYMPTOMS: DEPRESSION: 0

## 2023-09-01 NOTE — PROGRESS NOTES
Subjective   Patient ID: Stan Bee is a 43 y.o. female who presents for Follow-up.  HPI  Patient here for follow up on BP   No concerns   Reports medication compliance     Past Medical History:   Diagnosis Date    Other specified disorders of cornea, bilateral 10/23/2016    Corneal irritation, bilateral    Personal history of malignant neoplasm of cervix uteri 2014    History of cervical cancer    Unspecified acute conjunctivitis, bilateral 10/23/2016    Acute bacterial conjunctivitis of both eyes      Past Surgical History:   Procedure Laterality Date    CERVICAL BIOPSY  W/ LOOP ELECTRODE EXCISION  2014    Cervical Loop Electrosurgical Excision (LEEP)     SECTION, CLASSIC  2013     Section     SECTION, CLASSIC  2013     Section     SECTION, CLASSIC  2017     Section    CHOLECYSTECTOMY  2013    Cholecystectomy    OTHER SURGICAL HISTORY  2021    Total hysterectomy with removal of both tubes and ovaries    OTHER SURGICAL HISTORY  2017    Cervix Cryosurgery    TUBAL LIGATION  2014    Tubal Ligation      Family History   Problem Relation Name Age of Onset    Other (mva) Father      Allergies Other      Asthma Other      Eczema Other        Allergies   Allergen Reactions    Bee Venom Protein (Honey Bee) Anaphylaxis    Latex Anaphylaxis, Hives and Other    Cyclobenzaprine Unknown     hallucinations    House Dust Unknown     Pollen and dust allergy      Causes watery eyes    Lisinopril Other          Occupation:     Review of Systems   Constitutional:  Negative for activity change and fever.   HENT:  Negative for congestion.    Respiratory:  Negative for cough, shortness of breath and wheezing.    Cardiovascular:  Negative for chest pain and leg swelling.   Gastrointestinal:  Negative for abdominal pain, constipation, nausea and vomiting.   Endocrine: Negative for cold intolerance.   Genitourinary:  Negative for  "dysuria, hematuria and urgency.   Neurological:  Negative for dizziness, speech difficulty, weakness and numbness.   Psychiatric/Behavioral:  Negative for self-injury and suicidal ideas.        Objective   Visit Vitals  /79   Pulse 94   Resp 17   Ht 1.626 m (5' 4\")   Wt 139 kg (306 lb 6.4 oz)   SpO2 96%   BMI 52.59 kg/m²   Smoking Status Never   BSA 2.51 m²      Physical Exam  Constitutional:       Appearance: Normal appearance.   HENT:      Head: Normocephalic and atraumatic.      Nose: Nose normal.      Mouth/Throat:      Mouth: Mucous membranes are moist.   Eyes:      Conjunctiva/sclera: Conjunctivae normal.      Pupils: Pupils are equal, round, and reactive to light.   Cardiovascular:      Rate and Rhythm: Normal rate and regular rhythm.      Pulses: Normal pulses.      Heart sounds: Normal heart sounds.   Pulmonary:      Effort: Pulmonary effort is normal.      Breath sounds: Normal breath sounds.   Musculoskeletal:         General: Normal range of motion.      Cervical back: Neck supple.   Skin:     General: Skin is warm.   Neurological:      General: No focal deficit present.      Mental Status: She is alert and oriented to person, place, and time.   Psychiatric:         Mood and Affect: Mood normal.         Behavior: Behavior normal.         Thought Content: Thought content normal.         Judgment: Judgment normal.         Assessment/Plan     Problem List Items Addressed This Visit       Hypertension    Relevant Medications    losartan (Cozaar) 50 mg tablet    chlorthalidone (Hygroton) 25 mg tablet    Other Relevant Orders    Basic metabolic panel    Morbid obesity (CMS/HCC)     Other Visit Diagnoses       Eczema, unspecified type    -  Primary    Relevant Medications    triamcinolone (Kenalog) 0.1 % cream    EDISON (obstructive sleep apnea)        Relevant Orders    Referral to Adult Sleep Medicine         HTN  At goal   On losartan 50 BID, and cholrthalidonen 25 daily   Adv to continue potassium " supplements  Potassium suppe 20-10-20-10 alterantive   BMP in 1month    EDISON:  H/o EDISON In the past; had a sleep study     BMI 52.59  Continue Trulicity

## 2023-09-02 DIAGNOSIS — I10 PRIMARY HYPERTENSION: ICD-10-CM

## 2023-09-05 RX ORDER — POTASSIUM CHLORIDE 750 MG/1
10 TABLET, EXTENDED RELEASE ORAL DAILY
Qty: 30 TABLET | Refills: 1 | Status: SHIPPED | OUTPATIENT
Start: 2023-09-05 | End: 2023-09-25

## 2023-09-12 DIAGNOSIS — J30.2 SEASONAL ALLERGIES: ICD-10-CM

## 2023-09-12 RX ORDER — CETIRIZINE HYDROCHLORIDE 10 MG/1
TABLET ORAL
Qty: 30 TABLET | Refills: 1 | Status: SHIPPED | OUTPATIENT
Start: 2023-09-12

## 2023-09-24 DIAGNOSIS — I10 PRIMARY HYPERTENSION: ICD-10-CM

## 2023-09-25 RX ORDER — POTASSIUM CHLORIDE 750 MG/1
20 TABLET, EXTENDED RELEASE ORAL DAILY
Qty: 60 TABLET | Refills: 3 | Status: SHIPPED | OUTPATIENT
Start: 2023-09-25 | End: 2024-03-07 | Stop reason: SDUPTHER

## 2023-09-25 ASSESSMENT — ENCOUNTER SYMPTOMS
FEVER: 0
SHORTNESS OF BREATH: 0
SPEECH DIFFICULTY: 0
VOMITING: 0
ABDOMINAL PAIN: 0
CONSTIPATION: 0
NUMBNESS: 0
ACTIVITY CHANGE: 0
WEAKNESS: 0
HEMATURIA: 0
WHEEZING: 0
COUGH: 0
DYSURIA: 0
NAUSEA: 0
DIZZINESS: 0

## 2023-10-27 DIAGNOSIS — I10 PRIMARY HYPERTENSION: ICD-10-CM

## 2023-10-27 DIAGNOSIS — M67.88 OTHER SPECIFIED DISORDERS OF SYNOVIUM AND TENDON, OTHER SITE: ICD-10-CM

## 2023-10-30 DIAGNOSIS — L30.9 ECZEMA, UNSPECIFIED TYPE: ICD-10-CM

## 2023-10-30 RX ORDER — CHLORTHALIDONE 25 MG/1
25 TABLET ORAL DAILY
Qty: 90 TABLET | Refills: 0 | Status: SHIPPED | OUTPATIENT
Start: 2023-10-30 | End: 2024-03-07 | Stop reason: SDUPTHER

## 2023-10-30 RX ORDER — DICLOFENAC SODIUM 10 MG/G
GEL TOPICAL
Qty: 100 G | Refills: 3 | Status: SHIPPED | OUTPATIENT
Start: 2023-10-30 | End: 2024-04-17 | Stop reason: WASHOUT

## 2023-10-30 RX ORDER — LOSARTAN POTASSIUM 50 MG/1
50 TABLET ORAL DAILY
Qty: 90 TABLET | Refills: 2 | Status: SHIPPED | OUTPATIENT
Start: 2023-10-30 | End: 2024-03-07 | Stop reason: SDUPTHER

## 2023-10-30 RX ORDER — TRIAMCINOLONE ACETONIDE 1 MG/G
OINTMENT TOPICAL 2 TIMES DAILY PRN
Qty: 80 G | Refills: 2 | Status: SHIPPED | OUTPATIENT
Start: 2023-10-30 | End: 2024-02-16 | Stop reason: SDUPTHER

## 2023-11-24 ENCOUNTER — TELEPHONE (OUTPATIENT)
Dept: PRIMARY CARE | Facility: CLINIC | Age: 43
End: 2023-11-24
Payer: COMMERCIAL

## 2023-11-24 DIAGNOSIS — B96.89 BACTERIAL CONJUNCTIVITIS OF BOTH EYES: Primary | ICD-10-CM

## 2023-11-24 DIAGNOSIS — H10.9 BACTERIAL CONJUNCTIVITIS OF BOTH EYES: Primary | ICD-10-CM

## 2023-11-24 RX ORDER — CIPROFLOXACIN HYDROCHLORIDE 3 MG/ML
SOLUTION/ DROPS OPHTHALMIC
Qty: 10 ML | Refills: 0 | Status: SHIPPED | OUTPATIENT
Start: 2023-11-24

## 2023-11-24 NOTE — PROGRESS NOTES
Received a call from patient at 1:40 PM today stating that she has had pinkeye for the past 4 to 5 days.  Has discharge bilaterally in both eyes and has her eyes closed shut in the morning.  We discussed that this usually requires an in office assessment, however patient is not able to make it today due to her condition.  Given the possible diagnosis of bacterial conjunctivitis, agreeable to try ciprofloxacin eyedrops.  Sent to the pharmacy.

## 2023-12-03 ENCOUNTER — APPOINTMENT (OUTPATIENT)
Dept: RADIOLOGY | Facility: HOSPITAL | Age: 43
End: 2023-12-03
Payer: COMMERCIAL

## 2023-12-03 ENCOUNTER — HOSPITAL ENCOUNTER (EMERGENCY)
Facility: HOSPITAL | Age: 43
Discharge: HOME | End: 2023-12-03
Payer: COMMERCIAL

## 2023-12-03 VITALS
SYSTOLIC BLOOD PRESSURE: 151 MMHG | WEIGHT: 293 LBS | DIASTOLIC BLOOD PRESSURE: 89 MMHG | HEIGHT: 64 IN | OXYGEN SATURATION: 99 % | HEART RATE: 81 BPM | BODY MASS INDEX: 50.02 KG/M2 | RESPIRATION RATE: 16 BRPM

## 2023-12-03 DIAGNOSIS — M54.16 LUMBAR RADICULOPATHY: Primary | ICD-10-CM

## 2023-12-03 PROCEDURE — 99284 EMERGENCY DEPT VISIT MOD MDM: CPT | Mod: 25

## 2023-12-03 PROCEDURE — 93971 EXTREMITY STUDY: CPT

## 2023-12-03 PROCEDURE — 2500000001 HC RX 250 WO HCPCS SELF ADMINISTERED DRUGS (ALT 637 FOR MEDICARE OP): Performed by: PHYSICIAN ASSISTANT

## 2023-12-03 PROCEDURE — 93971 EXTREMITY STUDY: CPT | Mod: FOREIGN READ | Performed by: RADIOLOGY

## 2023-12-03 PROCEDURE — 94760 N-INVAS EAR/PLS OXIMETRY 1: CPT

## 2023-12-03 PROCEDURE — 96372 THER/PROPH/DIAG INJ SC/IM: CPT

## 2023-12-03 PROCEDURE — 2500000004 HC RX 250 GENERAL PHARMACY W/ HCPCS (ALT 636 FOR OP/ED): Performed by: PHYSICIAN ASSISTANT

## 2023-12-03 RX ORDER — METHOCARBAMOL 500 MG/1
1000 TABLET, FILM COATED ORAL ONCE
Status: COMPLETED | OUTPATIENT
Start: 2023-12-03 | End: 2023-12-03

## 2023-12-03 RX ORDER — METHOCARBAMOL 500 MG/1
500 TABLET, FILM COATED ORAL EVERY 6 HOURS PRN
Qty: 12 TABLET | Refills: 0 | Status: SHIPPED | OUTPATIENT
Start: 2023-12-03 | End: 2023-12-07 | Stop reason: ALTCHOICE

## 2023-12-03 RX ORDER — PREDNISONE 20 MG/1
40 TABLET ORAL ONCE
Status: COMPLETED | OUTPATIENT
Start: 2023-12-03 | End: 2023-12-03

## 2023-12-03 RX ORDER — IBUPROFEN 600 MG/1
600 TABLET ORAL EVERY 6 HOURS PRN
Qty: 20 TABLET | Refills: 0 | Status: SHIPPED | OUTPATIENT
Start: 2023-12-03 | End: 2023-12-07 | Stop reason: ALTCHOICE

## 2023-12-03 RX ORDER — METHOCARBAMOL 100 MG/ML
1000 INJECTION, SOLUTION INTRAMUSCULAR; INTRAVENOUS ONCE
Status: DISCONTINUED | OUTPATIENT
Start: 2023-12-03 | End: 2023-12-03

## 2023-12-03 RX ORDER — KETOROLAC TROMETHAMINE 30 MG/ML
15 INJECTION, SOLUTION INTRAMUSCULAR; INTRAVENOUS ONCE
Status: COMPLETED | OUTPATIENT
Start: 2023-12-03 | End: 2023-12-03

## 2023-12-03 RX ORDER — PREDNISONE 20 MG/1
40 TABLET ORAL DAILY
Qty: 10 TABLET | Refills: 0 | Status: SHIPPED | OUTPATIENT
Start: 2023-12-03 | End: 2023-12-08

## 2023-12-03 RX ORDER — LIDOCAINE 560 MG/1
1 PATCH PERCUTANEOUS; TOPICAL; TRANSDERMAL DAILY
Qty: 5 PATCH | Refills: 0 | Status: SHIPPED | OUTPATIENT
Start: 2023-12-03 | End: 2023-12-08

## 2023-12-03 RX ORDER — KETOROLAC TROMETHAMINE 30 MG/ML
15 INJECTION, SOLUTION INTRAMUSCULAR; INTRAVENOUS ONCE
Status: DISCONTINUED | OUTPATIENT
Start: 2023-12-03 | End: 2023-12-03

## 2023-12-03 RX ADMIN — METHOCARBAMOL 1000 MG: 500 TABLET ORAL at 05:08

## 2023-12-03 RX ADMIN — PREDNISONE 40 MG: 20 TABLET ORAL at 05:08

## 2023-12-03 RX ADMIN — KETOROLAC TROMETHAMINE 15 MG: 30 INJECTION, SOLUTION INTRAMUSCULAR at 05:09

## 2023-12-03 ASSESSMENT — PAIN SCALES - GENERAL
PAINLEVEL_OUTOF10: 4
PAINLEVEL_OUTOF10: 1
PAINLEVEL_OUTOF10: 1
PAINLEVEL_OUTOF10: 10 - WORST POSSIBLE PAIN

## 2023-12-03 ASSESSMENT — PAIN DESCRIPTION - FREQUENCY: FREQUENCY: CONSTANT/CONTINUOUS

## 2023-12-03 ASSESSMENT — COLUMBIA-SUICIDE SEVERITY RATING SCALE - C-SSRS
1. IN THE PAST MONTH, HAVE YOU WISHED YOU WERE DEAD OR WISHED YOU COULD GO TO SLEEP AND NOT WAKE UP?: NO
6. HAVE YOU EVER DONE ANYTHING, STARTED TO DO ANYTHING, OR PREPARED TO DO ANYTHING TO END YOUR LIFE?: NO
2. HAVE YOU ACTUALLY HAD ANY THOUGHTS OF KILLING YOURSELF?: NO

## 2023-12-03 ASSESSMENT — PAIN DESCRIPTION - ONSET: ONSET: ONGOING

## 2023-12-03 ASSESSMENT — PAIN DESCRIPTION - ORIENTATION: ORIENTATION: RIGHT

## 2023-12-03 ASSESSMENT — PAIN DESCRIPTION - PROGRESSION: CLINICAL_PROGRESSION: GRADUALLY WORSENING

## 2023-12-03 ASSESSMENT — PAIN DESCRIPTION - LOCATION: LOCATION: LEG

## 2023-12-03 ASSESSMENT — PAIN - FUNCTIONAL ASSESSMENT
PAIN_FUNCTIONAL_ASSESSMENT: 0-10

## 2023-12-03 ASSESSMENT — PAIN DESCRIPTION - PAIN TYPE: TYPE: ACUTE PAIN

## 2023-12-03 NOTE — ED PROVIDER NOTES
HPI     CC: Leg Pain     HPI: Stan Bee is a 43 y.o. female with a history of hypertension presents with concern for low back pain and leg pain.  Patient states that the pain started approximately 3 days ago.  She describes it as a charley horse type cramping which also involves a sharp shooting pain starting in her right gluteus muscle down through her leg.  She reports no trauma or recent injury.  She has tried Tylenol, Advil, and Aleve without relief.  She reports no fever or chills.  We discussed that this could be sciatica, she did mention that she has had previous back injections for low back pain in the past before.  States symptoms were never like this.  She denies loss of bowel or bladder function.    ROS: 10-point review of systems was performed and is otherwise negative except as noted in HPI.      Past Medical History: Noncontributory except per HPI     Past Surgical History: Reports , cryo freeze, and hysterectomy     Family History: Reviewed and noncontributory     Social History:  Denies tobacco. Denies ETOH. Denies illicit drugs.      Allergies   Allergen Reactions    Bee Venom Protein (Honey Bee) Anaphylaxis    Latex Anaphylaxis, Hives and Other    Cyclobenzaprine Unknown     hallucinations    House Dust Unknown     Pollen and dust allergy      Causes watery eyes    Lisinopril Other       Home Meds:   Current Outpatient Medications   Medication Instructions    albuterol 90 mcg/actuation inhaler 2 puffs, inhalation, Every 6 hours PRN    cetirizine (ZyrTEC) 10 mg tablet TAKE 1 TABLET BY MOUTH EVERY DAY    chlorthalidone (HYGROTON) 25 mg, oral, Daily    ciprofloxacin (Ciloxan) 0.3 % ophthalmic solution Instill 1 to 2 drops into the conjunctival sac every 2 hours while awake for 2 days and 1 to 2 drops every 4 hours while awake for the next 5 days.    clobetasol (Temovate) 0.05 % cream 1 Application, Topical, 2 times daily    diclofenac sodium (Voltaren) 1 % gel gel APPLY 4GM TO AFFECTED  "AREA(S) EVERY 6 HOURS AS NEEDED FOR PAIN    hydrocortisone 1 % cream APPLY SPARINGLY AND RUB IN WELL TO AFFECTED AREA(S) AS DIRECTED.    ibuprofen 600 mg, oral, Every 6 hours PRN    ketoconazole (NIZOral) 2 % cream 1 Application    lidocaine (AsperFlex, lidocaine,) 4 % patch 1 patch, transdermal, Daily, Remove & discard patch within 12 hours or as directed by MD.    losartan (COZAAR) 50 mg, oral, Daily    meloxicam (MOBIC) 15 mg, oral, Daily    meloxicam (MOBIC) 7.5 mg, oral, Daily    methocarbamol (ROBAXIN) 500 mg, oral, Every 6 hours PRN    mupirocin (Bactroban) 2 % ointment 2 times daily,  APPLY SPARINGLY TO AFFECTED AREA(S) TWICE DAILY    potassium chloride CR 10 mEq ER tablet 20 mEq, oral, Daily    predniSONE (DELTASONE) 40 mg, oral, Daily    tacrolimus (Protopic) 0.1 % ointment 1 Application    triamcinolone (Kenalog) 0.1 % cream 1 Application, Topical, 2 times daily    triamcinolone (Kenalog) 0.1 % ointment Topical, 2 times daily PRN    Trulicity 4.5 mg, subcutaneous, Weekly    urea (Carmol) 10 % cream 1 Application    urea (Carmol) 40 % cream 1 Application    white petrolatum (Aquaphor Healing) 41 % ointment ointment 1 Application        ED Triage Vitals [12/03/23 0241]   Temp Heart Rate Resp BP   -- 101 16 (!) 138/93      SpO2 Temp src Heart Rate Source Patient Position   -- -- Monitor Sitting      BP Location FiO2 (%)     Left arm --         Heart Rate:  [101]   Resp:  [16]   BP: (138)/(93)   Height:  [162.6 cm (5' 4\")]   Weight:  [136 kg (300 lb)]      Physical Exam:  Physical Exam  Constitutional:       General: She is not in acute distress.     Appearance: Normal appearance. She is not ill-appearing.   HENT:      Head: Normocephalic and atraumatic.      Right Ear: External ear normal.      Left Ear: External ear normal.      Nose: Nose normal.      Mouth/Throat:      Mouth: Mucous membranes are moist.   Eyes:      Extraocular Movements: Extraocular movements intact.      Conjunctiva/sclera: Conjunctivae " normal.      Pupils: Pupils are equal, round, and reactive to light.   Cardiovascular:      Rate and Rhythm: Normal rate and regular rhythm.      Pulses: Normal pulses.   Pulmonary:      Effort: Pulmonary effort is normal. No respiratory distress.      Breath sounds: Normal breath sounds.   Abdominal:      General: Abdomen is flat.      Palpations: Abdomen is soft.      Tenderness: There is no abdominal tenderness.   Musculoskeletal:      Cervical back: Normal range of motion and neck supple.      Comments: Tenderness along the right SI joint extending posteriorly down the leg and into the foot.  No overlying skin changes or rash.  No significant swelling, redness, or warmth of the right lower extremity.  2+ DP pulse.  Calf compartment is soft and compressible.  Patient has full flexion and extension at the knee and hip with some pain in the lower back.  No saddle anesthesia.   Skin:     General: Skin is warm and dry.      Capillary Refill: Capillary refill takes less than 2 seconds.   Neurological:      General: No focal deficit present.      Mental Status: She is alert and oriented to person, place, and time.   Psychiatric:         Mood and Affect: Mood normal.          Diagnostic Results        Labs Reviewed - No data to display      Lower extremity venous duplex right   Final Result   No evidence for DVT within the right lower extremity.   Signed by Ervin Cameron MD                    Jenna Coma Scale Score: 15                  Procedure  Procedures    ED Course & MDM   Assessment/Plan:     Medications   predniSONE (Deltasone) tablet 40 mg (40 mg oral Given 12/3/23 0508)   methocarbamol (Robaxin) tablet 1,000 mg (1,000 mg oral Given 12/3/23 0508)   ketorolac (Toradol) injection 15 mg (15 mg intramuscular Given 12/3/23 0509)        Diagnoses as of 12/03/23 0714   Lumbar radiculopathy       Medical Decision Making    Stan Bee is a 43 y.o. female with a history of hypertension presents with concern for  right-sided back pain and right lower extremity pain.  The patient is nontoxic-appearing and vital signs are mostly normal other than mildly elevated heart rate at a rate of 101.  Based on symptoms, differential diagnosis includes lumbar radiculopathy, DVT, Baker's cyst, or muscle strain.  Will obtain duplex right lower extremity for DVT and administer 1000 mg Robaxin, 15 mg IV Toradol, and 40 mg of prednisone.  Duplex ultrasound was negative for DVT.  Patient's pain did improve with medication administration.    Lumbar radiculopathy: Patient was educated about the findings on exam and on the ultrasound.  We discussed that given that she has had low back pain issues in the past, this could be a flare of similar pre-existing conditions.  We will plan to treat the pain as an outpatient with 5 days of steroids, Motrin, asperflex patches, and Robaxin given allergy to Flexeril.  Will refer the patient to orthopedic surgery as an outpatient should symptoms persist after medication ministration.  We discussed that this will hopefully resolve within a week or 2, but could last longer.  As we were discussing discharge instructions, the patient noted that she believes she had a nerve ablation in the past by pain management.  Recommended following up with pain management as an outpatient if symptoms continue.  If she develops any new or worsening symptoms such as but not limited to weakness of the extremities, loss of bowel or bladder function, new paresthesias, she should seek immediate medical attention.  Patient agreeable to plan of care and felt comfortable returning home.    Disposition: Home    ED Prescriptions       Medication Sig Dispense Start Date End Date Auth. Provider    predniSONE (Deltasone) 20 mg tablet Take 2 tablets (40 mg) by mouth once daily for 5 days. 10 tablet 12/3/2023 12/8/2023 Tiffany aCrrion PA-C    ibuprofen 600 mg tablet Take 1 tablet (600 mg) by mouth every 6 hours if needed for moderate pain (4 -  6) for up to 5 days. 20 tablet 12/3/2023 12/8/2023 Tiffany Carrion PA-C    lidocaine (AsperFlex, lidocaine,) 4 % patch Place 1 patch over 12 hours on the skin once daily for 5 days. Remove & discard patch within 12 hours or as directed by MD. 5 patch 12/3/2023 12/8/2023 Tiffany Carrion PA-C    methocarbamol (Robaxin) 500 mg tablet Take 1 tablet (500 mg) by mouth every 6 hours if needed for muscle spasms for up to 3 days. 12 tablet 12/3/2023 12/6/2023 Tiffany Carrion PA-C            Social Determinants Affecting Care: none      Tiffany Carrion PA-C    This note was dictated by speech recognition. Minor errors in transcription may be present.     Tiffany Carrion PA-C  12/03/23 0714

## 2023-12-03 NOTE — Clinical Note
Stan Bee was seen and treated in our emergency department on 12/3/2023.  She may return to work on 12/06/2023.       If you have any questions or concerns, please don't hesitate to call.      Tiffany Carrion PA-C

## 2023-12-03 NOTE — ED TRIAGE NOTES
Leg pain from buttocks to right leg pt stated it feels like a gauri horse. Pt did every intervention and still no improvement. It has been going on for 40 mins. 10/10 pain.

## 2023-12-07 ENCOUNTER — OFFICE VISIT (OUTPATIENT)
Dept: PAIN MEDICINE | Facility: HOSPITAL | Age: 43
End: 2023-12-07
Payer: COMMERCIAL

## 2023-12-07 DIAGNOSIS — M54.16 LUMBAR RADICULITIS: ICD-10-CM

## 2023-12-07 PROCEDURE — 99214 OFFICE O/P EST MOD 30 MIN: CPT | Performed by: ANESTHESIOLOGY

## 2023-12-07 PROCEDURE — 3008F BODY MASS INDEX DOCD: CPT | Performed by: ANESTHESIOLOGY

## 2023-12-07 RX ORDER — TIZANIDINE 4 MG/1
4 TABLET ORAL 2 TIMES DAILY PRN
Qty: 60 TABLET | Refills: 0 | Status: SHIPPED | OUTPATIENT
Start: 2023-12-07 | End: 2024-01-02

## 2023-12-07 RX ORDER — OXYCODONE HYDROCHLORIDE 5 MG/1
5 TABLET ORAL 3 TIMES DAILY PRN
Qty: 9 TABLET | Refills: 0 | Status: SHIPPED | OUTPATIENT
Start: 2023-12-07 | End: 2023-12-10

## 2023-12-07 ASSESSMENT — PAIN SCALES - GENERAL: PAINLEVEL: 10-WORST PAIN EVER

## 2023-12-07 NOTE — PROGRESS NOTES
Chief Complaint   Patient presents with    Back Pain    Leg Pain     Subjective   Patient ID: Stan Bee is a 43 y.o. female.    HPI  The patient is a 43 YOF FUV w/ PMHx significant for sacroiliitis who presents for further management of LBP w/ radiation down RLE. She recently presented to the ED 12/3/23 for exacerbation of this LBP with new radiating symptoms down her RLE. Given some robaxin, toradol, and steroid pack with improvement in her symptoms prior to discharge home. She was last seen 9/2022 where she underwent b/l SI joint RFA which did help. Imaging most significant for an MRI L spine 10/2021 which demonstrated b/l sacroiliitis.     On presentation today, the patient is complaining of the severe RLE pain. Pain originates in the R LBP with radiation posterior all the way to the foot. Described as both crampy and burning/stabbing in quality, severity consistently 9-10/10. She describes symptoms of allodynia to breeze from a fan. Pain is present with both rest and activity though worse with activity in general. The pain started roughly a week ago without any inciting event noted. She has had no relief with OTC pain meds, as well as with the recently prescribed robaxin, steroids, and toradol shot in the ED. Denying any other changes in the RLE including any erythema, swelling, weakness. Also denying any subjective fevers/chills, night sweats, bowel/bladder symptoms.    Pain has been so bad that the patient was contemplating going back to the ER again for severe and intractable pain.    Review of Systems  13 point review of systems was completed is negative unless stated above in HPI     Objective   Physical Exam  Constitutional:       Appearance: Normal appearance.   Eyes:      Pupils: Pupils are equal, round, and reactive to light.   Cardiovascular:      Rate and Rhythm: Normal rate and regular rhythm.   Pulmonary:      Effort: Pulmonary effort is normal.      Breath sounds: Normal breath sounds.    Abdominal:      General: Abdomen is flat.      Palpations: Abdomen is soft.   Musculoskeletal:      Cervical back: Normal range of motion.      Comments: Patient poor participation in physical exam 2/2 severe RLE pain    Diffuse tenderness of RLE to light touch  Muscle strength testing 4/5 RLE, 5/5 LLE  Normoreflexia  No appreciated swelling, deformity, erythema RLE when compared to LLE  Negative straight leg testing, FLO on L  Exquisite tenderness with any manipulation of RLE   Skin:     General: Skin is warm and dry.   Neurological:      General: No focal deficit present.      Mental Status: She is alert and oriented to person, place, and time.   Psychiatric:         Mood and Affect: Mood normal.         Assessment/Plan     43-year-old female with a history of low back and sacroiliac pain.  The patient now has had more acute and intractable right-sided low back and leg symptoms in an L5/S1 type distribution.  She recently presented to the ED 12/3/23 for exacerbation of this LBP with new radiating symptoms down her RLE. Given some robaxin, toradol, and steroid pack with improvement in sxs prior to discharge home. Imaging most significant for an MRI L spine 10/2021 which demonstrated b/l sacroiliitis.  History and physical at this time is most consistent with an acute lumbar radiculitis.    - Will schedule for L5-S1 intralaminar ROBSON with R sided bias.  Procedure, risk, benefits, alternatives reviewed with the patient.  - In the interim, will prescribe some tizanidine and short course of oxycodone.  Prescription reporting system reviewed.  Side effect profile and risk for the medications reviewed.    Patient seen and discussed with Dr. Matt.

## 2023-12-08 ENCOUNTER — HOSPITAL ENCOUNTER (OUTPATIENT)
Dept: RADIOLOGY | Facility: HOSPITAL | Age: 43
Discharge: HOME | End: 2023-12-08
Payer: COMMERCIAL

## 2023-12-08 VITALS
HEIGHT: 64 IN | RESPIRATION RATE: 18 BRPM | OXYGEN SATURATION: 98 % | SYSTOLIC BLOOD PRESSURE: 130 MMHG | HEART RATE: 84 BPM | WEIGHT: 293 LBS | TEMPERATURE: 98.2 F | BODY MASS INDEX: 50.02 KG/M2 | DIASTOLIC BLOOD PRESSURE: 76 MMHG

## 2023-12-08 DIAGNOSIS — M54.16 LUMBAR RADICULITIS: ICD-10-CM

## 2023-12-08 PROCEDURE — 62323 NJX INTERLAMINAR LMBR/SAC: CPT | Performed by: ANESTHESIOLOGY

## 2023-12-08 PROCEDURE — 77003 FLUOROGUIDE FOR SPINE INJECT: CPT

## 2023-12-08 PROCEDURE — 94760 N-INVAS EAR/PLS OXIMETRY 1: CPT

## 2023-12-08 PROCEDURE — 2500000004 HC RX 250 GENERAL PHARMACY W/ HCPCS (ALT 636 FOR OP/ED): Performed by: ANESTHESIOLOGY

## 2023-12-08 RX ORDER — MIDAZOLAM HYDROCHLORIDE 1 MG/ML
INJECTION INTRAMUSCULAR; INTRAVENOUS
Status: COMPLETED | OUTPATIENT
Start: 2023-12-08 | End: 2023-12-08

## 2023-12-08 RX ADMIN — MIDAZOLAM HYDROCHLORIDE 2 MG: 1 INJECTION INTRAMUSCULAR; INTRAVENOUS at 08:38

## 2023-12-08 RX ADMIN — MIDAZOLAM HYDROCHLORIDE 2 MG: 1 INJECTION INTRAMUSCULAR; INTRAVENOUS at 08:35

## 2023-12-08 ASSESSMENT — PAIN SCALES - GENERAL
PAINLEVEL_OUTOF10: 0 - NO PAIN
PAINLEVEL_OUTOF10: 5 - MODERATE PAIN
PAINLEVEL_OUTOF10: 5 - MODERATE PAIN
PAINLEVEL_OUTOF10: 0 - NO PAIN
PAINLEVEL_OUTOF10: 5 - MODERATE PAIN
PAINLEVEL_OUTOF10: 8

## 2023-12-08 ASSESSMENT — PAIN - FUNCTIONAL ASSESSMENT
PAIN_FUNCTIONAL_ASSESSMENT: 0-10

## 2023-12-08 NOTE — PROCEDURES
Preoperative diagnosis:  Lumbar radiculitis  Postoperative diagnosis:  Lumbar radiculitis, acute  Procedure: Right biased L5-S1 lumbar epidural steroid injection under fluoroscopic guidance  Surgeon: Velma Matt  Assistant:  Fellow, Tim Paniagua  Anesthesia: Local, 4 mg midazolam  Complications: Apparently none    Clinical note: Ms. Bee is a 43-year-old female with history of intractable low back and right leg pain who was recently in the ER for the aforementioned symptoms.  Patient has been unable to work and has failed conservative measures thus far.  Here today for the aforementioned epidural but if we do not see relief, we will obtain an MRI.    Procedure note: The patient was met in the preoperative holding area after risks benefits and alternatives to procedure were discussed with the patient, informed consent was obtained. Patient brought back to the procedure room and placed in the prone position on the fluoroscopy table. Area over the back was exposed, prepped, draped, in the usual sterile fashion.  Skin and subcutaneous tissues to the lumbar intralaminar space was anesthetized using 0.5% lidocaine.  An 4.5 inch 18-gauge Tuohy needle was inserted in the skin and advanced into the interlaminar space. A glass syringe was used to achieve the epidural space using the loss resistance technique. Contrast was injected which showed appropriate epidural spread, no intravascular or intrathecal uptake. A total of 2 mL's of 0.5% lidocaine mixed with 40 mg methylprednisolone was injected. Needle removed, bandage applied, patient tolerated the procedure well with no immediate complications.

## 2023-12-08 NOTE — H&P
History Of Present Illness  Stan Bee is a 43 y.o. female presenting with right greater than left lumbar radiculitis.         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    Corneal irritation, bilateral    Personal history of malignant neoplasm of cervix uteri 2014    History of cervical cancer    Unspecified acute conjunctivitis, bilateral 10/23/2016    Acute bacterial conjunctivitis of both eyes       Surgical History  Past Surgical History:   Procedure Laterality Date    CERVICAL BIOPSY  W/ LOOP ELECTRODE EXCISION  2014    Cervical Loop Electrosurgical Excision (LEEP)     SECTION, CLASSIC  2013     Section     SECTION, CLASSIC  2013     Section     SECTION, CLASSIC  2017     Section    CHOLECYSTECTOMY  2013    Cholecystectomy    OTHER SURGICAL HISTORY  2021    Total hysterectomy with removal of both tubes and ovaries    OTHER SURGICAL HISTORY  2017    Cervix Cryosurgery    TUBAL LIGATION  2014    Tubal Ligation        Social History  She reports that she has never smoked. She does not have any smokeless tobacco history on file. She reports current alcohol use. She reports that she does not use drugs.    Family History  Family History   Problem Relation Name Age of Onset    Other (mva) Father      Allergies Other      Asthma Other      Eczema Other          Allergies  Bee venom protein (honey bee), Latex, Cyclobenzaprine, House dust, and Lisinopril    Review of Systems   13 point ROS done and negative except for the above.   Physical Exam     General: NAD, well nourished   Eyes: Non-icteric sclera, EOMI  Ears, Nose, Mouth, and Throat: External ears and nose appear to be without deformity or rash. No lesions or masses noted. Hearing is grossly intact.   Neck: Trachea midline  Respiratory: Nonlabored breathing   Cardiovascular: No  "JVD  Skin: No rashes or open lesions/ulcers identified on skin.    Last Recorded Vitals  Blood pressure 157/74, pulse 78, temperature 36.6 °C (97.9 °F), temperature source Tympanic, resp. rate 18, height 1.626 m (5' 4\"), weight 133 kg (294 lb), SpO2 98 %.    Relevant Results           Assessment/Plan   Problem List Items Addressed This Visit    None  Visit Diagnoses         Codes    Lumbar radiculitis     M54.16    Relevant Orders    FL pain management TC    Epidural Steroid Injection            Plan for L5/S1 lumbar interlaminar epidural steroid injection    Risks, benefits, alternatives to the procedure were discussed in detail and patient was amenable to proceeding.    Jeffy Paniagua MD    "

## 2024-01-02 DIAGNOSIS — M54.16 LUMBAR RADICULITIS: ICD-10-CM

## 2024-01-02 RX ORDER — TIZANIDINE 4 MG/1
4 TABLET ORAL 2 TIMES DAILY PRN
Qty: 60 TABLET | Refills: 0 | Status: SHIPPED | OUTPATIENT
Start: 2024-01-02 | End: 2024-02-19

## 2024-01-04 ENCOUNTER — APPOINTMENT (OUTPATIENT)
Dept: PRIMARY CARE | Facility: CLINIC | Age: 44
End: 2024-01-04
Payer: COMMERCIAL

## 2024-01-05 DIAGNOSIS — M54.42 CHRONIC LOW BACK PAIN WITH BILATERAL SCIATICA, UNSPECIFIED BACK PAIN LATERALITY: ICD-10-CM

## 2024-01-05 DIAGNOSIS — M54.41 CHRONIC LOW BACK PAIN WITH BILATERAL SCIATICA, UNSPECIFIED BACK PAIN LATERALITY: ICD-10-CM

## 2024-01-05 DIAGNOSIS — G89.29 CHRONIC LOW BACK PAIN WITH BILATERAL SCIATICA, UNSPECIFIED BACK PAIN LATERALITY: ICD-10-CM

## 2024-01-05 DIAGNOSIS — M79.2 NERVE PAIN: ICD-10-CM

## 2024-02-15 ENCOUNTER — TELEPHONE (OUTPATIENT)
Dept: PRIMARY CARE | Facility: CLINIC | Age: 44
End: 2024-02-15

## 2024-02-15 ENCOUNTER — APPOINTMENT (OUTPATIENT)
Dept: PRIMARY CARE | Facility: CLINIC | Age: 44
End: 2024-02-15
Payer: COMMERCIAL

## 2024-02-16 DIAGNOSIS — L30.9 ECZEMA, UNSPECIFIED TYPE: Primary | ICD-10-CM

## 2024-02-16 RX ORDER — TRIAMCINOLONE ACETONIDE 1 MG/G
OINTMENT TOPICAL 2 TIMES DAILY PRN
Qty: 80 G | Refills: 3 | Status: SHIPPED | OUTPATIENT
Start: 2024-02-16 | End: 2024-06-15

## 2024-02-17 DIAGNOSIS — M54.16 LUMBAR RADICULITIS: ICD-10-CM

## 2024-02-19 RX ORDER — TIZANIDINE 4 MG/1
4 TABLET ORAL 2 TIMES DAILY PRN
Qty: 60 TABLET | Refills: 0 | Status: SHIPPED | OUTPATIENT
Start: 2024-02-19 | End: 2024-05-07 | Stop reason: SDUPTHER

## 2024-03-07 ENCOUNTER — LAB (OUTPATIENT)
Dept: LAB | Facility: LAB | Age: 44
End: 2024-03-07
Payer: COMMERCIAL

## 2024-03-07 ENCOUNTER — OFFICE VISIT (OUTPATIENT)
Dept: PRIMARY CARE | Facility: CLINIC | Age: 44
End: 2024-03-07
Payer: COMMERCIAL

## 2024-03-07 VITALS
BODY MASS INDEX: 50.02 KG/M2 | HEIGHT: 64 IN | DIASTOLIC BLOOD PRESSURE: 74 MMHG | HEART RATE: 86 BPM | OXYGEN SATURATION: 96 % | SYSTOLIC BLOOD PRESSURE: 106 MMHG | WEIGHT: 293 LBS

## 2024-03-07 DIAGNOSIS — Z00.00 HEALTH CARE MAINTENANCE: ICD-10-CM

## 2024-03-07 DIAGNOSIS — R53.83 OTHER FATIGUE: ICD-10-CM

## 2024-03-07 DIAGNOSIS — I10 PRIMARY HYPERTENSION: ICD-10-CM

## 2024-03-07 DIAGNOSIS — E66.01 MORBID OBESITY (MULTI): ICD-10-CM

## 2024-03-07 DIAGNOSIS — E55.9 VITAMIN D DEFICIENCY: ICD-10-CM

## 2024-03-07 DIAGNOSIS — N61.0 ACUTE MASTITIS OF RIGHT BREAST: ICD-10-CM

## 2024-03-07 DIAGNOSIS — D50.9 IRON DEFICIENCY ANEMIA, UNSPECIFIED IRON DEFICIENCY ANEMIA TYPE: Primary | ICD-10-CM

## 2024-03-07 LAB
25(OH)D3 SERPL-MCNC: 18 NG/ML (ref 30–100)
ALBUMIN SERPL BCP-MCNC: 4.3 G/DL (ref 3.4–5)
ALP SERPL-CCNC: 56 U/L (ref 33–110)
ALT SERPL W P-5'-P-CCNC: 22 U/L (ref 7–45)
ANION GAP SERPL CALC-SCNC: 13 MMOL/L (ref 10–20)
AST SERPL W P-5'-P-CCNC: 15 U/L (ref 9–39)
BILIRUB SERPL-MCNC: 0.3 MG/DL (ref 0–1.2)
BUN SERPL-MCNC: 13 MG/DL (ref 6–23)
CALCIUM SERPL-MCNC: 9.8 MG/DL (ref 8.6–10.6)
CHLORIDE SERPL-SCNC: 101 MMOL/L (ref 98–107)
CHOLEST SERPL-MCNC: 155 MG/DL (ref 0–199)
CHOLESTEROL/HDL RATIO: 4.1
CO2 SERPL-SCNC: 31 MMOL/L (ref 21–32)
CREAT SERPL-MCNC: 0.81 MG/DL (ref 0.5–1.05)
EGFRCR SERPLBLD CKD-EPI 2021: >90 ML/MIN/1.73M*2
ERYTHROCYTE [DISTWIDTH] IN BLOOD BY AUTOMATED COUNT: 12.3 % (ref 11.5–14.5)
GLUCOSE SERPL-MCNC: 111 MG/DL (ref 74–99)
HCT VFR BLD AUTO: 37.9 % (ref 36–46)
HDLC SERPL-MCNC: 38.2 MG/DL
HGB BLD-MCNC: 12.4 G/DL (ref 12–16)
LDLC SERPL CALC-MCNC: 84 MG/DL
MCH RBC QN AUTO: 29.4 PG (ref 26–34)
MCHC RBC AUTO-ENTMCNC: 32.7 G/DL (ref 32–36)
MCV RBC AUTO: 90 FL (ref 80–100)
NON HDL CHOLESTEROL: 117 MG/DL (ref 0–149)
NRBC BLD-RTO: 0 /100 WBCS (ref 0–0)
PLATELET # BLD AUTO: 390 X10*3/UL (ref 150–450)
POTASSIUM SERPL-SCNC: 3.5 MMOL/L (ref 3.5–5.3)
PROT SERPL-MCNC: 7.6 G/DL (ref 6.4–8.2)
RBC # BLD AUTO: 4.22 X10*6/UL (ref 4–5.2)
SODIUM SERPL-SCNC: 141 MMOL/L (ref 136–145)
TRIGL SERPL-MCNC: 163 MG/DL (ref 0–149)
TSH SERPL-ACNC: 2.04 MIU/L (ref 0.44–3.98)
VIT B12 SERPL-MCNC: 617 PG/ML (ref 211–911)
VLDL: 33 MG/DL (ref 0–40)
WBC # BLD AUTO: 8.7 X10*3/UL (ref 4.4–11.3)

## 2024-03-07 PROCEDURE — 3078F DIAST BP <80 MM HG: CPT | Performed by: STUDENT IN AN ORGANIZED HEALTH CARE EDUCATION/TRAINING PROGRAM

## 2024-03-07 PROCEDURE — 84443 ASSAY THYROID STIM HORMONE: CPT

## 2024-03-07 PROCEDURE — 82607 VITAMIN B-12: CPT

## 2024-03-07 PROCEDURE — 80061 LIPID PANEL: CPT

## 2024-03-07 PROCEDURE — 82306 VITAMIN D 25 HYDROXY: CPT

## 2024-03-07 PROCEDURE — 36415 COLL VENOUS BLD VENIPUNCTURE: CPT

## 2024-03-07 PROCEDURE — 3074F SYST BP LT 130 MM HG: CPT | Performed by: STUDENT IN AN ORGANIZED HEALTH CARE EDUCATION/TRAINING PROGRAM

## 2024-03-07 PROCEDURE — 80053 COMPREHEN METABOLIC PANEL: CPT

## 2024-03-07 PROCEDURE — 85027 COMPLETE CBC AUTOMATED: CPT

## 2024-03-07 PROCEDURE — 99214 OFFICE O/P EST MOD 30 MIN: CPT | Performed by: STUDENT IN AN ORGANIZED HEALTH CARE EDUCATION/TRAINING PROGRAM

## 2024-03-07 PROCEDURE — 3008F BODY MASS INDEX DOCD: CPT | Performed by: STUDENT IN AN ORGANIZED HEALTH CARE EDUCATION/TRAINING PROGRAM

## 2024-03-07 RX ORDER — POTASSIUM CHLORIDE 750 MG/1
20 TABLET, FILM COATED, EXTENDED RELEASE ORAL DAILY
Qty: 60 TABLET | Refills: 3 | Status: SHIPPED | OUTPATIENT
Start: 2024-03-07 | End: 2024-03-28

## 2024-03-07 RX ORDER — CHLORTHALIDONE 25 MG/1
25 TABLET ORAL DAILY
Qty: 90 TABLET | Refills: 0 | Status: SHIPPED | OUTPATIENT
Start: 2024-03-07 | End: 2024-06-05

## 2024-03-07 RX ORDER — LOSARTAN POTASSIUM 50 MG/1
50 TABLET ORAL DAILY
Qty: 90 TABLET | Refills: 2 | Status: SHIPPED | OUTPATIENT
Start: 2024-03-07

## 2024-03-07 RX ORDER — AMOXICILLIN AND CLAVULANATE POTASSIUM 875; 125 MG/1; MG/1
875 TABLET, FILM COATED ORAL 2 TIMES DAILY
Qty: 14 TABLET | Refills: 0 | Status: SHIPPED | OUTPATIENT
Start: 2024-03-07 | End: 2024-03-14

## 2024-03-07 RX ORDER — SEMAGLUTIDE 1 MG/.5ML
1 INJECTION, SOLUTION SUBCUTANEOUS
Qty: 2 ML | Refills: 0 | Status: SHIPPED | OUTPATIENT
Start: 2024-03-07 | End: 2024-05-30 | Stop reason: WASHOUT

## 2024-03-07 ASSESSMENT — ENCOUNTER SYMPTOMS
NUMBNESS: 0
WHEEZING: 0
DYSURIA: 0
NAUSEA: 0
COUGH: 0
ABDOMINAL PAIN: 0
WEAKNESS: 0
FEVER: 0
SHORTNESS OF BREATH: 0
VOMITING: 0
CONSTIPATION: 0
HEMATURIA: 0
SPEECH DIFFICULTY: 0
ACTIVITY CHANGE: 0
DIZZINESS: 0

## 2024-03-07 NOTE — PROGRESS NOTES
Subjective   Patient ID: Stan Bee is a 43 y.o. female who presents for right breast pain   HPI  Patient is 43 years old with history of hypertension, obesity, cutaneous T-cell lymphoma is here complaining of ongoing right breast pain since 1 month.  She also noticed a lump in her right chest along with pain.  Reports the lump is growing in size  Also reports moderate to severe pain.  No nipple discharge reported.  No numbness on the left breast.  No history of trauma reported      Past Medical History:   Diagnosis Date    Other specified disorders of cornea, bilateral 10/23/2016    Corneal irritation, bilateral    Personal history of malignant neoplasm of cervix uteri 2014    History of cervical cancer    Unspecified acute conjunctivitis, bilateral 10/23/2016    Acute bacterial conjunctivitis of both eyes      Past Surgical History:   Procedure Laterality Date    CERVICAL BIOPSY  W/ LOOP ELECTRODE EXCISION  2014    Cervical Loop Electrosurgical Excision (LEEP)     SECTION, CLASSIC  2013     Section     SECTION, CLASSIC  2013     Section     SECTION, CLASSIC  2017     Section    CHOLECYSTECTOMY  2013    Cholecystectomy    OTHER SURGICAL HISTORY  2021    Total hysterectomy with removal of both tubes and ovaries    OTHER SURGICAL HISTORY  2017    Cervix Cryosurgery    TUBAL LIGATION  2014    Tubal Ligation      Family History   Problem Relation Name Age of Onset    Other (mva) Father      Allergies Other      Asthma Other      Eczema Other        Allergies   Allergen Reactions    Bee Venom Protein (Honey Bee) Anaphylaxis    Latex Anaphylaxis, Hives and Other    Cyclobenzaprine Unknown     hallucinations    House Dust Unknown     Pollen and dust allergy      Causes watery eyes    Lisinopril Other          Occupation:     Review of Systems   Constitutional:  Negative for activity change and fever.   HENT:   "Negative for congestion.    Respiratory:  Negative for cough, shortness of breath and wheezing.    Cardiovascular:  Negative for chest pain and leg swelling.   Gastrointestinal:  Negative for abdominal pain, constipation, nausea and vomiting.   Endocrine: Negative for cold intolerance.   Genitourinary:  Negative for dysuria, hematuria and urgency.   Neurological:  Negative for dizziness, speech difficulty, weakness and numbness.   Psychiatric/Behavioral:  Negative for self-injury and suicidal ideas.        Objective   Visit Vitals  /74   Pulse 86   Ht 1.626 m (5' 4\")   Wt 138 kg (304 lb)   SpO2 96%   BMI 52.18 kg/m²   OB Status Hysterectomy   Smoking Status Never   BSA 2.5 m²      Physical Exam  Constitutional:       Appearance: Normal appearance.   HENT:      Head: Normocephalic and atraumatic.      Nose: Nose normal.      Mouth/Throat:      Mouth: Mucous membranes are moist.   Eyes:      Conjunctiva/sclera: Conjunctivae normal.      Pupils: Pupils are equal, round, and reactive to light.   Cardiovascular:      Rate and Rhythm: Normal rate and regular rhythm.      Pulses: Normal pulses.      Heart sounds: Normal heart sounds.   Pulmonary:      Effort: Pulmonary effort is normal.      Breath sounds: Normal breath sounds.   Chest:      Comments: Right breast-skin erythema present in the inner upper quadrant extending onto the lower quadrant and umbilical area.  This induration +, no nipple discharge  No lymphadenopathy felt    Musculoskeletal:         General: Normal range of motion.      Cervical back: Neck supple.   Skin:     General: Skin is warm.   Neurological:      General: No focal deficit present.      Mental Status: She is alert and oriented to person, place, and time.   Psychiatric:         Mood and Affect: Mood normal.         Behavior: Behavior normal.         Thought Content: Thought content normal.         Judgment: Judgment normal.         Assessment/Plan   Diagnoses and all orders for this " visit:  Iron deficiency anemia, unspecified iron deficiency anemia type  Primary hypertension  Blood pressure at goal today.  Continue losartan 50 mg once a day along with chlorthalidone 25 mg.  To continue potassium pills.  Recheck blood count and lipid profile today  -     Comprehensive Metabolic Panel; Future  -     Lipid Panel; Future  -     CBC; Future  -     semaglutide, weight loss, (Wegovy) 1 mg/0.5 mL pen injector; Inject 1 mg under the skin 1 (one) time per week for 4 doses.  -     losartan (Cozaar) 50 mg tablet; Take 1 tablet (50 mg) by mouth once daily.  -     chlorthalidone (Hygroton) 25 mg tablet; Take 1 tablet (25 mg) by mouth once daily.  -     potassium chloride CR 10 mEq ER tablet; Take 2 tablets (20 mEq) by mouth once daily.  Vitamin D deficiency  History of vitamin D deficiency, endorses fatigue-     Vitamin D 25 hydroxy; Future  Other fatigue  Fatigue could be multifactorial including possible obstructive sleep apnea, patient also works night shifts.  We will check for iron and B12 which the patient was deficit in the past  Right breast examination significant for-     Vitamin B12; Future  -     TSH with reflex to Free T4 if abnormal; Future  Acute mastitis of right breast   examination significant for mastitis.  Agreeable to start Augmentin vaginal yeast infection  F We discussed about possible side effects including allergic reactions, diarrhea and patient to make an ultrasound appointment if not getting better in 3 to 4 days and to see me back in 10 days if not getting better  Patient verbalizes understanding-     amoxicillin-pot clavulanate (Augmentin) 875-125 mg tablet; Take 1 tablet (875 mg) by mouth 2 times a day for 7 days.  -     BI US breast complete right; Future  Morbid obesity (CMS/HCC)  On Trulicity did not achieve much benefit.  Agreeable to try Wegovy.  Side effects including medullary thyroid cancer acute pancreatitis discussed applies-verbalizes understanding    She will get  sleep study soon         semaglutide, weight loss, (Wegovy) 1 mg/0.5 mL pen injector; Inject 1 mg under the skin 1 (one) time per week for 4 doses.  Health care maintenance  Patient does have a family history of breast cancer in mother.  Patient is due for mammogram.  Advised     BI mammo bilateral screening tomosynthesis; Future      patient to follow-up in 10 days if not getting better.  Else to follow-up with me once for hypertension

## 2024-03-14 ENCOUNTER — TELEPHONE (OUTPATIENT)
Dept: PRIMARY CARE | Facility: CLINIC | Age: 44
End: 2024-03-14
Payer: COMMERCIAL

## 2024-03-15 ENCOUNTER — HOSPITAL ENCOUNTER (OUTPATIENT)
Dept: RADIOLOGY | Facility: CLINIC | Age: 44
Discharge: HOME | End: 2024-03-15
Payer: COMMERCIAL

## 2024-03-15 ENCOUNTER — TELEPHONE (OUTPATIENT)
Dept: PRIMARY CARE | Facility: CLINIC | Age: 44
End: 2024-03-15
Payer: COMMERCIAL

## 2024-03-15 DIAGNOSIS — E55.9 VITAMIN D DEFICIENCY: ICD-10-CM

## 2024-03-15 DIAGNOSIS — G89.29 CHRONIC LOW BACK PAIN WITH BILATERAL SCIATICA, UNSPECIFIED BACK PAIN LATERALITY: ICD-10-CM

## 2024-03-15 DIAGNOSIS — M54.41 CHRONIC LOW BACK PAIN WITH BILATERAL SCIATICA, UNSPECIFIED BACK PAIN LATERALITY: ICD-10-CM

## 2024-03-15 DIAGNOSIS — M54.42 CHRONIC LOW BACK PAIN WITH BILATERAL SCIATICA, UNSPECIFIED BACK PAIN LATERALITY: ICD-10-CM

## 2024-03-15 DIAGNOSIS — R73.9 HYPERGLYCEMIA: Primary | ICD-10-CM

## 2024-03-15 DIAGNOSIS — M79.2 NERVE PAIN: ICD-10-CM

## 2024-03-15 DIAGNOSIS — E66.01 MORBID OBESITY (MULTI): ICD-10-CM

## 2024-03-15 PROCEDURE — 72148 MRI LUMBAR SPINE W/O DYE: CPT

## 2024-03-15 PROCEDURE — 72148 MRI LUMBAR SPINE W/O DYE: CPT | Performed by: RADIOLOGY

## 2024-03-15 RX ORDER — CHOLECALCIFEROL (VITAMIN D3) 50 MCG
50 TABLET ORAL DAILY
Qty: 30 TABLET | Refills: 11 | Status: SHIPPED | OUTPATIENT
Start: 2024-03-15 | End: 2025-03-15

## 2024-03-15 RX ORDER — DULAGLUTIDE 4.5 MG/.5ML
4.5 INJECTION, SOLUTION SUBCUTANEOUS
Qty: 2 ML | Refills: 11 | Status: SHIPPED | OUTPATIENT
Start: 2024-03-15

## 2024-03-15 NOTE — TELEPHONE ENCOUNTER
Result Communication    Resulted Orders   Vitamin D 25 hydroxy   Result Value Ref Range    Vitamin D, 25-Hydroxy, Total 18 (L) 30 - 100 ng/mL    Narrative    Deficiency:         < 20   ng/ml  Insufficiency:      20-29  ng/ml  Sufficiency:         ng/ml  This assay accurately quantifies the sum of Vitamin D3, 25-Hydroxy and Vitamin D2,25-Hydroxy.   Vitamin B12   Result Value Ref Range    Vitamin B12 617 211 - 911 pg/mL   TSH with reflex to Free T4 if abnormal   Result Value Ref Range    Thyroid Stimulating Hormone 2.04 0.44 - 3.98 mIU/L    Narrative    TSH testing is performed using different testing methodology at [facility] than at other [facility]. Direct result comparisons should only be made within the same method.     Comprehensive Metabolic Panel   Result Value Ref Range    Glucose 111 (H) 74 - 99 mg/dL    Sodium 141 136 - 145 mmol/L    Potassium 3.5 3.5 - 5.3 mmol/L    Chloride 101 98 - 107 mmol/L    Bicarbonate 31 21 - 32 mmol/L    Anion Gap 13 10 - 20 mmol/L    Urea Nitrogen 13 6 - 23 mg/dL    Creatinine 0.81 0.50 - 1.05 mg/dL    eGFR >90 >60 mL/min/1.73m*2      Comment:      Calculations of estimated GFR are performed using the 2021 CKD-EPI Study Refit equation without the race variable for the IDMS-Traceable creatinine methods.  https://jasn.asnjournals.org/content/early/2021/09/22/ASN.7632955532    Calcium 9.8 8.6 - 10.6 mg/dL    Albumin 4.3 3.4 - 5.0 g/dL    Alkaline Phosphatase 56 33 - 110 U/L    Total Protein 7.6 6.4 - 8.2 g/dL    AST 15 9 - 39 U/L    Bilirubin, Total 0.3 0.0 - 1.2 mg/dL    ALT 22 7 - 45 U/L      Comment:      Patients treated with Sulfasalazine may generate falsely decreased results for ALT.   Lipid Panel   Result Value Ref Range    Cholesterol 155 0 - 199 mg/dL      Comment:            Age      Desirable   Borderline High   High     0-19 Y     0 - 169       170 - 199     >/= 200    20-24 Y     0 - 189       190 - 224     >/= 225         >24 Y     0 - 199        200 - 239     >/= 240   **All ranges are based on fasting samples. Specific   therapeutic targets will vary based on patient-specific   cardiac risk.    Pediatric guidelines reference:Pediatrics 2011, 128(S5).Adult guidelines reference: NCEP ATPIII Guidelines,SOLO 2001, 258:2486-97    Venipuncture immediately after or during the administration of Metamizole may lead to falsely low results. Testing should be performed immediately prior to Metamizole dosing.    HDL-Cholesterol 38.2 mg/dL      Comment:        Age       Very Low   Low     Normal    High    0-19 Y    < 35      < 40     40-45     ----  20-24 Y    ----     < 40      >45      ----        >24 Y      ----     < 40     40-60      >60      Cholesterol/HDL Ratio 4.1       Comment:        Ref Values  Desirable  < 3.4  High Risk  > 5.0    LDL Calculated 84 <=99 mg/dL      Comment:                                  Near   Borderline      AGE      Desirable  Optimal    High     High     Very High     0-19 Y     0 - 109     ---    110-129   >/= 130     ----    20-24 Y     0 - 119     ---    120-159   >/= 160     ----      >24 Y     0 -  99   100-129  130-159   160-189     >/=190      VLDL 33 0 - 40 mg/dL    Triglycerides 163 (H) 0 - 149 mg/dL      Comment:         Age         Desirable   Borderline High   High     Very High   0 D-90 D    19 - 174         ----         ----        ----  91 D- 9 Y     0 -  74        75 -  99     >/= 100      ----    10-19 Y     0 -  89        90 - 129     >/= 130      ----    20-24 Y     0 - 114       115 - 149     >/= 150      ----         >24 Y     0 - 149       150 - 199    200- 499    >/= 500    Venipuncture immediately after or during the administration of Metamizole may lead to falsely low results. Testing should be performed immediately prior to Metamizole dosing.    Non HDL Cholesterol 117 0 - 149 mg/dL      Comment:            Age       Desirable   Borderline High   High     Very High     0-19 Y     0 - 119       120 - 144      >/= 145    >/= 160    20-24 Y     0 - 149       150 - 189     >/= 190      ----         >24 Y    30 mg/dL above LDL Cholesterol goal     CBC   Result Value Ref Range    WBC 8.7 4.4 - 11.3 x10*3/uL    nRBC 0.0 0.0 - 0.0 /100 WBCs    RBC 4.22 4.00 - 5.20 x10*6/uL    Hemoglobin 12.4 12.0 - 16.0 g/dL    Hematocrit 37.9 36.0 - 46.0 %    MCV 90 80 - 100 fL    MCH 29.4 26.0 - 34.0 pg    MCHC 32.7 32.0 - 36.0 g/dL    RDW 12.3 11.5 - 14.5 %    Platelets 390 150 - 450 x10*3/uL       5:26 PM      Results were successfully communicated with the patient and they acknowledged their understanding.

## 2024-03-27 DIAGNOSIS — I10 PRIMARY HYPERTENSION: ICD-10-CM

## 2024-03-28 RX ORDER — POTASSIUM CHLORIDE 750 MG/1
20 TABLET, EXTENDED RELEASE ORAL DAILY
Qty: 180 TABLET | Refills: 1 | Status: SHIPPED | OUTPATIENT
Start: 2024-03-28

## 2024-04-09 NOTE — PROGRESS NOTES
[facility] Sleep Medicine  [facility]  [address]  [facility] [address]  [phone]     [facility] Sleep Medicine Clinic  New Visit Note      Subjective   Patient ID: Stan Bee is a 43 y.o. female with past medical history significant for Morbid Obesity, Hypertension, Obstructive sleep apnea, and Bariatric Surgery Status.    4/17/2024: The patient is here alone today for a comprehensive sleep medicine evaluation due to sleep apnea and needs a new machine. She stopped using her pap for a year due to the break. Besides, she has severe Obstructive sleep apnea, which is untreated. 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 She is not happy to do another titration sleep study. However, her old titration sleep study was over three years, and she had not used a pap for at least one year. She agrees to do another titration sleep study after the explanation. Her ESS: 15,  ZAHIDA: 15   today.    HPI  Patient had been having these symptoms for the past 6 years. Patient was diagnosed with EDISON in 2019 by [facility] and was using CPAP intermittently for 3-4 years then stopped due to machine broken.     SLEEP STUDY HISTORY: (personally reviewed raw data such as interpretation report, data sheet, hypnogram, and titration table if available and applicable)  9/16/2019: SPLIT NIGHT: AHI :64.8/hr, EtCO2 : 54.6, Al: 86% with 1 LO2, successfully titrated at 13 CWP, consider 12-20 CWP with F&P simplus medium mask.  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  Naps: No  Average sleep duration (excluding naps):  4-5  hours    SLEEP ENVIRONMENT  Sleep location: bed  Sleep status: sleeps alone  Room is dark:  Yes  Room is quiet: Yes  Room is cool: Yes  Bed comfort: good    SLEEP HABITS:   Activities before bedtime: no  Activities in bed: no  Preferred sleep position: stomach and side    SLEEP ROS:  Night symptoms: POSITIVE for snoring, witnessed apnea, nasal congestion , and mouth breathing  Morning symptoms: POSITIVE for unrefreshing sleep, morning headache, and morning dry mouth  Daytime symptoms POSITIVE for excessive daytime sleepiness, fatigue, and trouble remembering things in daytime  Hypersomnia / narcolepsy symptoms: Patient denies symptoms of a hypersomnolence disorder such as sleep paralysis, sleep-related hallucinations, recurrent sleep attacks, automatic behaviors, and cataplexy.   RLS symptoms: Patient denies RLS symptoms.  Movements in sleep: Patient denies problematic movements in sleep.  Parasomnia symptoms: Patient denies symptoms of parasomnia.    WEIGHT: gained 30 lbs in 6 months     REVIEW OF SYSTEMS: All other systems have been reviewed and are negative.    PERTINENT SOCIAL HISTORY:  Occupation: night shift   Smoking: No   ETOH: Yes  and socially  Marijuana: No   Caffeine: few: 3 times of month  Sleep aids: No   Claustrophobia: No     PERTINENT PAST SURGICAL HISTORY:  non-contributory    PERTINENT FAMILY HISTORY:  Patient denies family history of any sleep disorder.  Patient denies family history of sleep apnea.    Active Problems, Allergy List, Medication List, and PMH/PSH/FH/Social Hx have been reviewed and reconciled in chart. No significant changes unless documented in the pertinent chart section. Updates made when necessary.       Objective     Vitals:    04/17/24 1324   BP: 125/78   Pulse: 90   Temp: 36.6 °C (97.9 °F)   SpO2: 98%       Physical Exam  Constitutional:alert and oriented to time, place, and person  Sinus: - tenderness to palpation  Palate: Narrow Mallampati 3, + Tongue  scalloping, + macroglossia  Lungs: Clear to auscultation bilateral, no rales  Heart: Regular rate and rhythm, no murmurs    Assessment/Plan   Stan Bee is a 43 y.o. female who is seen to evaluate for severe obstructive sleep apnea. Risk factors of sleep apnea as well as cardiometabolic and neurocognitive sequelae associated with untreated sleep apnea were also discussed. Lastly, patient was advised to avoid driving vehicle or operating heavy machinery when sleepy.     Stan Bee with the following problems:     # OBSTRUCTIVE SLEEP APNEA: AHI : 64.8/hr-2019  -Proceed a titration sleep study to get an appropriate pressure setting to treat her Obstructive sleep apnea.   -Sleep apnea and PAP therapy education were provided at length in the clinic today. Stan Bee verbalized understanding.  -Emphasized diet, exercise, and weight loss in the clinic, as were non-supine sleep, avoiding alcohol in the late evening, and driving or operating heavy machinery when sleepy.  -Stan Beeverbalizes understanding of the above instructions and risks.    # HYPERTENSION:  -Blood pressure was controlled today.  -Denies headache, palpitation, and syncope in the clinic.  -Follows with PCP/ Cardiology     # MORBID OBESITY:   -with a BMI of 54.76.   Bicarbonate   Date Value Ref Range Status   03/07/2024 31 21 - 32 mmol/L Final   -Encourage to have regular exercise to manage weight well.  -Bariatric surgery candidate.    # XEROSTOMIA:  -Instruct Stan Beeto purchase the Biotene gel to ease the dry mouth symptom.    # CIRCADIAN RHYTHM SLEEP-WAKE DISORDER- Shift Work  - Maximize sleep time to 7-8 hours.   - Make sure the room is dark, quiet, cool and interruptions are eliminated.   -Avoid light in the mornings, wear dark sunglasses driving home.   -Expose to bright light or daylight upon waking.   -Avoid caffeine 8 hours prior to sleeping.       RTC 2-3 weeks after sleep study      All of patient's questions were  answered. She verbalizes understanding and agreement with my assessment and plan.

## 2024-04-17 ENCOUNTER — OFFICE VISIT (OUTPATIENT)
Dept: SLEEP MEDICINE | Facility: HOSPITAL | Age: 44
End: 2024-04-17
Payer: COMMERCIAL

## 2024-04-17 VITALS
WEIGHT: 293 LBS | TEMPERATURE: 97.9 F | DIASTOLIC BLOOD PRESSURE: 78 MMHG | HEART RATE: 90 BPM | BODY MASS INDEX: 54.76 KG/M2 | SYSTOLIC BLOOD PRESSURE: 125 MMHG | OXYGEN SATURATION: 98 %

## 2024-04-17 DIAGNOSIS — I10 HYPERTENSION, UNSPECIFIED TYPE: ICD-10-CM

## 2024-04-17 DIAGNOSIS — G47.26 SHIFT WORK SLEEP DISORDER: ICD-10-CM

## 2024-04-17 DIAGNOSIS — G47.33 OSA (OBSTRUCTIVE SLEEP APNEA): Primary | ICD-10-CM

## 2024-04-17 DIAGNOSIS — E66.01 MORBID OBESITY (MULTI): ICD-10-CM

## 2024-04-17 PROCEDURE — 3078F DIAST BP <80 MM HG: CPT

## 2024-04-17 PROCEDURE — 3008F BODY MASS INDEX DOCD: CPT

## 2024-04-17 PROCEDURE — 99214 OFFICE O/P EST MOD 30 MIN: CPT

## 2024-04-17 PROCEDURE — 99204 OFFICE O/P NEW MOD 45 MIN: CPT

## 2024-04-17 PROCEDURE — 3074F SYST BP LT 130 MM HG: CPT

## 2024-04-17 RX ORDER — CEPHALEXIN 500 MG/1
CAPSULE ORAL
COMMUNITY
Start: 2024-04-12

## 2024-04-17 SDOH — ECONOMIC STABILITY: FOOD INSECURITY: WITHIN THE PAST 12 MONTHS, THE FOOD YOU BOUGHT JUST DIDN'T LAST AND YOU DIDN'T HAVE MONEY TO GET MORE.: NEVER TRUE

## 2024-04-17 SDOH — ECONOMIC STABILITY: FOOD INSECURITY: WITHIN THE PAST 12 MONTHS, YOU WORRIED THAT YOUR FOOD WOULD RUN OUT BEFORE YOU GOT MONEY TO BUY MORE.: NEVER TRUE

## 2024-04-17 ASSESSMENT — SLEEP AND FATIGUE QUESTIONNAIRES
SATISFACTION_WITH_CURRENT_SLEEP_PATTERN: VERY DISSATISFIED
DIFFICULTY_STAYING_ASLEEP: MILD
SLEEP_PROBLEM_INTERFERES_DAILY_ACTIVITIES: MUCH
HOW LIKELY ARE YOU TO NOD OFF OR FALL ASLEEP WHILE SITTING AND READING: MODERATE CHANCE OF DOZING
HOW LIKELY ARE YOU TO NOD OFF OR FALL ASLEEP WHEN YOU ARE A PASSENGER IN A CAR FOR AN HOUR WITHOUT A BREAK: MODERATE CHANCE OF DOZING
ESS-CHAD TOTAL SCORE: 15
HOW LIKELY ARE YOU TO NOD OFF OR FALL ASLEEP WHILE SITTING AND TALKING TO SOMEONE: MODERATE CHANCE OF DOZING
DIFFICULTY_FALLING_ASLEEP: MILD
SLEEP_PROBLEM_NOTICEABLE_TO_OTHERS: MUCH
HOW LIKELY ARE YOU TO NOD OFF OR FALL ASLEEP WHILE LYING DOWN TO REST IN THE AFTERNOON WHEN CIRCUMSTANCES PERMIT: HIGH CHANCE OF DOZING
HOW LIKELY ARE YOU TO NOD OFF OR FALL ASLEEP IN A CAR, WHILE STOPPED FOR A FEW MINUTES IN TRAFFIC: SLIGHT CHANCE OF DOZING
WORRIED_DISTRESSED_DUE_TO_SLEEP: MUCH
HOW LIKELY ARE YOU TO NOD OFF OR FALL ASLEEP WHILE SITTING QUIETLY AFTER LUNCH WITHOUT ALCOHOL: MODERATE CHANCE OF DOZING
HOW LIKELY ARE YOU TO NOD OFF OR FALL ASLEEP WHILE WATCHING TV: SLIGHT CHANCE OF DOZING
SITING INACTIVE IN A PUBLIC PLACE LIKE A CLASS ROOM OR A MOVIE THEATER: MODERATE CHANCE OF DOZING

## 2024-04-17 ASSESSMENT — PATIENT HEALTH QUESTIONNAIRE - PHQ9
1. LITTLE INTEREST OR PLEASURE IN DOING THINGS: NOT AT ALL
10. IF YOU CHECKED OFF ANY PROBLEMS, HOW DIFFICULT HAVE THESE PROBLEMS MADE IT FOR YOU TO DO YOUR WORK, TAKE CARE OF THINGS AT HOME, OR GET ALONG WITH OTHER PEOPLE: NOT DIFFICULT AT ALL
SUM OF ALL RESPONSES TO PHQ9 QUESTIONS 1 & 2: 1
2. FEELING DOWN, DEPRESSED OR HOPELESS: SEVERAL DAYS

## 2024-04-17 ASSESSMENT — PAIN SCALES - GENERAL: PAINLEVEL: 0-NO PAIN

## 2024-04-17 ASSESSMENT — LIFESTYLE VARIABLES: HOW OFTEN DO YOU HAVE A DRINK CONTAINING ALCOHOL: MONTHLY OR LESS

## 2024-04-17 NOTE — PATIENT INSTRUCTIONS
[facility] Sleep Medicine  [facility]  [address]  [facility] [id number] [phone]       Thank you for coming to the Sleep Medicine Clinic today! Your sleep medicine provider today was: KAIN Ferrera Below is a summary of your treatment plan, patient education, other important information, and our contact numbers.    Dear Stan Bee,    Thank you for visiting  Sleep Medicine Clinic !     1. We will proceed with a  PAP TITRATION sleep study to check your risk of sleep apnea. The lab will call you and schedule it for you.     2. Please do not drive when you are sleepy and  start practicing the sleep hygiene  as discussed in clinic today.     3. FOR QUESTIONS AND CONCERNS:   a) :To schedule, cancel, or reschedule SLEEP STUDY appointments, please call [phone]  b): Please call my office with issues or questions: [phone] (Rakel); [phone] (Rubio); [phone] (Bland)    If you have a CPAP or BiPAP machine at home, please bring the unit and all accessories including the power cord to your appointments unless I tell you otherwise. Please have knowledge of the DME company you worked with to receive your PAP device. If you have copies of any previous sleep testing completed outside of , please bring with you to clinic as well. This information will make our visits more productive.     If you are new to CPAP or BiPAP, please note the minimum usage insurance requires to continuing coverage for the equipment as noted by your DME company. Please discuss equipment issues (PAP unit, mask fit, humidification, etc.) with your DME company first.       In the event that you are running more than 10 minutes late to your appointment, I will kindly ask you to reschedule. Thanks.      TREATMENT PLAN     Follow-up Appointment:   Follow-up in 2-3 weeks after sleep study.    PATIENT EDUCATION     Obstructive Sleep Apnea (EDISON)  is a sleep disorder where your upper airway muscles relax during sleep and the airway intermittently and repetitively narrows and collapses leading to partially blocked airway (hypopnea) or completely blocked airway (apnea) which, in turn, can disrupt breathing in sleep, lower oxygen levels while you sleep and cause night time wakings. Because both apnea and hypopnea may cause higher carbon dioxide or low oxygen levels, untreated EDISON can lead to heart arrhythmia, elevation of blood pressure, and make it harder for the body to consolidate memory and facilitate metabolism (leading to higher blood sugars at night). Frequent partial arousals occur during sleep resulting in sleep deprivation and daytime sleepiness. EDISON is associated with an increased risk of cardiovascular disease, stroke, hypertension, and insulin resistance. Moreover, untreated EDISON with excessive daytime sleepiness can increase the risk of motor vehicular accidents.    Some conservative strategies for EDISON regardless of EDISON severity are:   Positional therapy - Avoid sleeping on your back.   Healthy diet and regular exercise to optimize weight is highly encouraged.   Avoid alcohol late in the evening and sedative-hypnotics as these substances can make sleep apnea worse.   Improve breathing through the nose with intranasal steroid spray, saline rinse, or antihistamines    Safety: Avoid driving vehicle and operating heavy equipment while sleepy. Drowsy driving may lead to life-threatening motor vehicle accidents. A person driving while sleepy is 5 times more likely to have an accident. If you feel sleepy, pull over and take a short power nap (sleep for less than 30 minutes). Otherwise, ask somebody to drive you.    Treatment options for sleep apnea include weight management, positional therapy, Positive Airway Therapy (PAP) therapy, oral appliance therapy, hypoglossal nerve stimulator (Inspire) and select airway surgeries.    Sleep Testing for sleep apnea:  The best and ideal way to check out if you have sleep apnea is to do an overnight sleep study in the sleep laboratory. Alternatively, a home sleep apnea test can also be done depending on your insurance and risk factors.     If you are having a home sleep apnea test, kindly allot 1 hour during pickup of the testing kit as you will have to complete paperwork and listen to the sleep technician for in-person on-the-spot demonstration and instructions on how to hook up the testing kit at home. Do the test for 1 day and start off with sleeping on your back. If you sleep on your side in the middle of night or you have always been a side or stomach-sleeper, it is ok as long as you have some time on your back and off-back.     If you are having an overnight in-lab sleep study, please make sure to bring toiletries, a comfy pillow, additional warm blankets, and any nighttime medications (include as-needed inhaler, pain pill, etc) that you may regularly take. Also, be sure to eat dinner before you arrive as we generally do not provide meals inside the sleep testing center. Lastly, in order to fall asleep faster in the sleep testing center, we advise patients to wake up 2 hours earlier on the morning of scheduled testing and avoid napping 2 days prior testing. Sometimes, your sleep provider may prescribe a sleep aid to be taken at lights out in the sleep testing center. If you are taking a sleep aid, consider having somebody pick you up after the sleep testing.    Overnight sleep studies may be scheduled on a weekday or weekend. We also perform daytime testing for shift workers on a case-by-case basis.    Once you have booked an appointment to do the sleep study, please contact my office for follow-up visit to discuss results.    On the other hand, if you have any of the following, please consider calling the sleep testing center to RESCHEDULE your sleep study appointment:  If you tested positive for COVID within 10 days of  your sleep study appointment.  If you were exposed to somebody who was confirmed for COVID within 10 days of your sleep study appointment and now you are having symptoms of possible COVID  If you have fever>100F or any acute symptoms that you think will lead to poor sleep during testing (e.g. new or worsening stuffy nose not relieved by steroid nasal spray)  If you have traveled domestically or internationally in the last month and now you are having symptoms of possible COVID      OUR SLEEP TESTING LOCATIONS     Our team will contact you to schedule your sleep study, however, you can contact us as follow:  Main Phone Line (scheduling only): [phone]), option 3  Adult and Pediatric Locations  [facility] (6 years and older): [facility] - 4th floor ([address] [facility]) After hours line: [phone]  [facility] (Main campus: All ages): [facility], 6th floor. After hours line: [phone]   [place] (5 years and older; younger considered on case-by-case basis): [address]; [address], [address]. Scheduling  After hours line: [phone]   [place] (6 years and older): [address]; [address]; [address]   [place] (6 years and older): [address] [facility], [address]  After hours line: [phone]   [place] (13 years and older) in [place]: [address], 2nd floor  After hours line: [phone]  [facility] (13 year and older): [address]  After hours line: [phone]     Adult Only Locations:   [place] (18 years and older): [address] [facility], 2nd floor   [place] (18 years and older): [address] [facility]; 4th floor  After hours line: [phone]   [place] (18 years and older) at [place]: [address] [facility]  After hours line: [phone]     CONTACTING YOUR SLEEP MEDICINE PROVIDER AND SLEEP TEAM      For issues with your machine or mask interface, please  "call your DME provider first. [employer] stands for durable medical company. [employer] is the company who provides you the machine and/or PAP supplies / accessories.   To schedule, cancel, or reschedule SLEEP STUDY APPOINTMENTS, please call the Main Phone Line at [phone] - option 3.   To schedule, cancel, or reschedule CLINIC APPOINTMENTS, you can do it in \"MyChart\", call [phone] to speak with my  (Monica New), or call the Main Phone Line at [phone] - option 2  For CLINICAL QUESTIONS or MEDICATION REFILLS, please call direct line for Adult Sleep Nurses at [phone].   Lastly, you can also send a message directly to your provider through \"My Chart\", which is the email service through your  Records Account: [email or web address]       Here at [facility], we wish you a restful sleep!   You can also go to the following EDUCATION WEBSITES for further information:   American Academy of Sleep Medicine http://sleepeducation.org  National Sleep Foundation: https://sleepfoundation.org  American Sleep Apnea Association: https://www.sleepapnea.org (for patients with sleep apnea)  Narcolepsy Network: https://www.narcolepsynetwork.org (for patients with narcolepsy)  [employer]: https://www.wakeupExpert Planetcolepsy.org (for patients with narcolepsy)  Hypersomnia Foundation: https://www.hypersomniafoundation.org (for patients with idiopathic hypersomnia)  RLS foundation: https://www.rls.org (for patients with restless leg syndrome)    IMPORTANT INFORMATION     Call 911 for medical emergencies.  Our offices are generally open from Monday-Friday, 8 am - 5 pm.   There are no supporting services by either the sleep doctors or their staff on weekends and Holidays, or after 5 PM on weekdays.   If you need to get in touch with me, you may either call my office number or you can use [employer].  If a referral for a test, for CPAP, or for another specialist was made, and you have not heard " about scheduling this within a week, please call scheduling at [phone]).  If you are unable to make your appointment for clinic or an overnight study, kindly call the office or sleep testing center at least 48 hours in advance to cancel and reschedule.  If you are on CPAP, please bring your device's card and/or the device to each clinic appointment.   In case of problems with PAP machine or mask interface, please contact your DME (Durable Medical Equipment) company first. DME is the company who provides you the machine and/or PAP supplies.       PRESCRIPTIONS     We require 7 days advanced notice for prescription refills. If we do not receive the request in this time, we cannot guarantee that your medication will be refilled in time.    IMPORTANT PHONE NUMBERS     Sleep Medicine Clinic Fax: [phone]  Appointments (for Pediatric Sleep Clinic): [phone] - option 1  Appointments (for Adult Sleep Clinic): [phone] - option 2  Appointments (For Sleep Studies): [phone] - option 3  Behavioral Sleep Medicine: [phone]  Sleep Surgery: [phone]  Nutrition Service: [phone]  Weight management clinics with endocrinology: [phone]  Bariatric Services: [phone] (includes weight loss medications and weight loss surgery)  [facility]: [phone] (offers holistic approaches to weight management)  ENT (Otolaryngology): [phone]  Headache Clinic (Neurology): [phone]  Neurology: [phone]  Psychiatry: [phone]  Pulmonary Function Testing (PFT) Center: [phone]  Pulmonary Medicine: [phone]  Medical Service Company ([facility]): [phone]      OUR SLEEP TESTING LOCATIONS     Our team will contact you to schedule your sleep study, however, you can contact us as follow:  Main Phone Line (scheduling only): [phone]), option 3  Adult and Pediatric Locations  [facility] (6 years and older): [facility]  "4th floor ([address] [facility]) After hours line: [phone]  [facility] at [facility] (Main campus: All ages): [place], 6th floor. After hours line: [phone]   [place] (5 years and older; younger considered on case-by-case basis): [address]; [address], [address]. Scheduling  After hours line: [phone]   [place] (6 years and older): [address]; [address]; [address]   [place] (6 years and older): [address] [facility], [address]  After hours line: [phone]   [place] (13 years and older) in [place]: [address], 2nd floor  After hours line: [phone]   [place] (13 year and older): [address]  After hours line: [phone]     Adult Only Locations:   [place] (18 years and older): [address] [place] [address], 2nd floor   [place] (18 years and older): [address] [facility]; 4th floor  After hours line: [phone]   [place] (18 years and older) at [place]: [address] [facility]  After hours line: [phone]     CONTACTING YOUR SLEEP MEDICINE PROVIDER AND SLEEP TEAM      For issues with your machine or mask interface, please call your DME provider first. DME stands for durable medical company. DME is the company who provides you the machine and/or PAP supplies / accessories.   To schedule, cancel, or reschedule SLEEP STUDY APPOINTMENTS, please call the Main Phone Line at [phone] - option 3.   To schedule, cancel, or reschedule CLINIC APPOINTMENTS, you can do it in \"[employer]\", call [phone] to speak with my  (Monica New), or call the Main Phone Line at [phone] - option 2  For CLINICAL QUESTIONS or MEDICATION REFILLS, please call direct line for Adult Sleep Nurses at [phone].   Lastly, you can also send a message directly to your provider through \"My Chart\", which is the email service through your  Records Account: [email or web address] "       Here at [facility], we wish you a restful sleep!

## 2024-04-22 ENCOUNTER — PATIENT MESSAGE (OUTPATIENT)
Dept: SURGERY | Facility: CLINIC | Age: 44
End: 2024-04-22
Payer: COMMERCIAL

## 2024-04-22 DIAGNOSIS — Z98.84 BARIATRIC SURGERY STATUS: ICD-10-CM

## 2024-05-07 ENCOUNTER — OFFICE VISIT (OUTPATIENT)
Dept: PAIN MEDICINE | Facility: HOSPITAL | Age: 44
End: 2024-05-07
Payer: COMMERCIAL

## 2024-05-07 DIAGNOSIS — M54.16 LUMBAR RADICULITIS: ICD-10-CM

## 2024-05-07 DIAGNOSIS — M54.16 LUMBAR RADICULOPATHY: ICD-10-CM

## 2024-05-07 PROCEDURE — 3008F BODY MASS INDEX DOCD: CPT | Performed by: ANESTHESIOLOGY

## 2024-05-07 PROCEDURE — 99214 OFFICE O/P EST MOD 30 MIN: CPT | Performed by: ANESTHESIOLOGY

## 2024-05-07 RX ORDER — TIZANIDINE 4 MG/1
4 TABLET ORAL 2 TIMES DAILY PRN
Qty: 60 TABLET | Refills: 0 | Status: SHIPPED | OUTPATIENT
Start: 2024-05-07 | End: 2024-06-06

## 2024-05-07 ASSESSMENT — PAIN SCALES - GENERAL: PAINLEVEL: 7

## 2024-05-07 NOTE — PROGRESS NOTES
Chief Complaint   Patient presents with    Back Pain    Extremity Pain     History Of Present Illness  Stan Bee is a 43 y.o. female with a past medical history of HTN, HLD, asthma, EDISON who presents for follow-up of chronic lower back pain.    Patient has a history of chronic lower back pain. She last received a L5-S1 epidural steroid injection in 2023. She states that she had >80% relief of her lower back and RLE pain/radiculopathy symptoms for approximately 3 months following the procedure. She then gradually developed a return of her lower back pain but the RLE radiculopathy symptoms never returned. Over the last few weeks the lower back pain has continued to worsen and has become severe and is associated with bl hip pain as well. The pain is worsening with walking and physical activity and is not improved with anything. She does not currently engage with PT. She only takes tizandine at home for her symptoms. Recent MRI shows stable unchanged mild facet arthropathy, disc bulging in L4-5, L5-S1.    The pain causes significant stress in the patient's life, specifically interferes with general activity, mood, walking ability, ability to perform tasks at home and/or work.  Denies any bowel or bladder incontinence, saddle anesthesia, worsening pain, weakness or falls.     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    Surgical History  She has a past surgical history that includes Cholecystectomy (2013);  section, classic (2013);  section, classic (2013); Other surgical history (2021); Cervical biopsy w/ loop electrode excision (2014); Tubal ligation (2014);  section, classic (2017); and Other surgical history (2017).     Social  History  She reports that she has been smoking cigarettes. She has never used smokeless tobacco. She reports current alcohol use. She reports that she does not use drugs.    Family History  Family History   Problem Relation Name Age of Onset    Other (mva) Father      Allergies Other      Asthma Other      Eczema Other          Allergies  Bee venom protein (honey bee), Latex, Cyclobenzaprine, House dust, and Lisinopril    Review of Symptoms:   Constitutional: Negative for chills, diaphoresis or fever  HENT: Negative for neck swelling  Eyes:.  Negative for eye pain  Respiratory:.  Negative for cough, shortness of breath or wheezing    Cardiovascular:.  Negative for chest pain or palpitations  Gastrointestinal:.  Negative for abdominal pain, nausea and vomiting  Genitourinary:.  Negative for urgency  Musculoskeletal:  Positive for bl lower back pain with hip pain. Positive for joint pain. Denies falls within the past 3 months.  Skin: Negative for wounds or itching   Neurological: Negative for dizziness, seizures, loss of consciousness and weakness  Endo/Heme/Allergies: Does not bruise/bleed easily  Psychiatric/Behavioral: Negative for depression. The patient does not appear anxious.       PHYSICAL EXAM  Vitals signs reviewed  Constitutional:       General: Not in acute distress     Appearance: Normal appearance. Not ill-appearing.  HENT:     Head: Normocephalic and atraumatic  Eyes:     Conjunctiva/sclera: Conjunctivae normal  Cardiovascular:     Rate and Rhythm: Normal rate and regular rhythm  Pulmonary:     Effort: No respiratory distress  Abdominal:     Palpations: Abdomen is soft  Musculoskeletal: PORRAS  Skin:     General: Skin is warm and dry  Neurological:     General: No focal deficit present  Psychiatric:         Mood and Affect: Mood normal         Behavior: Behavior normal    Advanced Exam   Inspection: No gross deformities, no surgical scars  Palpation: No tenderness of patient of lumbar midline, lumbar  paraspinals, bilateral SI joints  ROM: Normal range of motion of the lumbar flexion extension  Motor: 5-5 strength upper and lower extremities  Sensory: Negative for sensory abnormalities in upper and lower extremities  Reflexes: 2+ reflexes bilateral upper and lower extremities  Lumbar: Negative straight leg raising bilaterally  Sacral: Negative Hanh  Hip: Mild pain with anterior, lateral, posterior palpation of hip joints     Last Recorded Vitals  There were no vitals taken for this visit.    Relevant Results  Current Outpatient Medications   Medication Instructions    albuterol 90 mcg/actuation inhaler 2 puffs, inhalation, Every 6 hours PRN    cephalexin (Keflex) 500 mg capsule     cetirizine (ZyrTEC) 10 mg tablet TAKE 1 TABLET BY MOUTH EVERY DAY    chlorthalidone (HYGROTON) 25 mg, oral, Daily    cholecalciferol (VITAMIN D3) 50 mcg, oral, Daily    ciprofloxacin (Ciloxan) 0.3 % ophthalmic solution Instill 1 to 2 drops into the conjunctival sac every 2 hours while awake for 2 days and 1 to 2 drops every 4 hours while awake for the next 5 days.    clobetasol (Temovate) 0.05 % cream 1 Application, Topical, 2 times daily    hydrocortisone 1 % cream APPLY SPARINGLY AND RUB IN WELL TO AFFECTED AREA(S) AS DIRECTED.    ketoconazole (NIZOral) 2 % cream 1 Application    losartan (COZAAR) 50 mg, oral, Daily    potassium chloride CR 10 mEq ER tablet 20 mEq, oral, Daily    tacrolimus (Protopic) 0.1 % ointment 1 Application    tiZANidine (ZANAFLEX) 4 mg, oral, 2 times daily PRN    triamcinolone (Kenalog) 0.1 % ointment Topical, 2 times daily PRN    Trulicity 4.5 mg, subcutaneous, Once Weekly    urea (Carmol) 10 % cream 1 Application    urea (Carmol) 40 % cream 1 Application    Wegovy 1 mg, subcutaneous, Once Weekly    white petrolatum (Aquaphor Healing) 41 % ointment ointment 1 Application         MR lumbar spine wo IV contrast 03/15/2024    Narrative  Interpreted By:  Vijaya Moody,  STUDY:  MRI of the lumbar spine without  IV contrast;  3/15/2024 10:17 am    INDICATION:  Signs/Symptoms:pain.    COMPARISON:  10/18/2021.    ACCESSION NUMBER(S):  [id number]    ORDERING CLINICIAN:  LUCIAN STEVENS    TECHNIQUE:  Sagittal and axial STIR and T1-weighted MRI images of the lumbar  spine were acquired using a spondylolysis protocol.  No contrast was  administered.    FINDINGS:  For counting purposes the last lumbarized vertebral body is labeled  L5.    Alignment, Vertebral body heights and marrow signal pattern are  within normal limits.    Minimal disc height loss at T9-T10 T10-T11. Intervertebral disc  spaces are otherwise maintained.    The conus terminates at L1-L2 and is unremarkable.    Prevertebral soft tissues are not thickened.    Evaluation by level:    There are degenerative changes in the lower thoracic spine without  significant spinal canal stenosis. At least mild neural foraminal  narrowing at T10-T11. No axial images were performed through this  level.    T11-T12: No spinal canal or neural foraminal stenosis    T12-L1: No spinal canal or neural foraminal stenosis    L1-L2: No spinal canal or neural foraminal stenosis    L2-L3: Minimal right eccentric disc bulge and facet arthrosis. No  spinal canal or neural foraminal stenosis    L3-L4: No spinal canal or neural foraminal stenosis.    L4-L5: Mild disc bulge and facet arthrosis. No spinal canal stenosis.  Mild neural foraminal narrowing.    L5-S1: Mild disc bulge and facet arthrosis. No spinal canal stenosis.  Mild neural foraminal stenosis.    Impression  Mild multilevel degenerative changes without significant spinal canal  stenosis. Mild neural foraminal narrowing at L4-L5 and L5-S1.    There are degenerative changes in the lower thoracic spine without  significant spinal canal stenosis. At least mild neural foraminal  narrowing at T10-T11. No axial images were performed through this  level.    I personally reviewed the images/study and I agree with the findings  as  stated. This study was interpreted at [place], [place].    MACRO:  None    Signed by: Vijaya Moody 3/15/2024 10:48 AM  Dictation workstation:   [id number]      MR LUMBAR SPINE WO IV CONTRAST 10/18/2021    Narrative  MRN: [id number]  Patient Name: CASPER LAWS    STUDY:  MRI L-SPINE WO;  10/18/2021 11:35 am    INDICATION:  low back pain.    COMPARISON:  Radiographs May 28, 2021    ACCESSION NUMBER(S):  [id number]    ORDERING CLINICIAN:  LUCIAN STEVENS    TECHNIQUE:  Sagittal T1, T2, STIR, axial T1 and T2 weighted images of the lumbar  spine were acquired.    FINDINGS:  Alignment: The vertebral alignment is maintained.    Vertebrae/Intervertebral Discs: The vertebral bodies demonstrate  expected height.  The marrow signal is within normal limits. The  intervertebral discs also demonstrate normal signal and morphology.    Conus: The lower thoracic cord appears unremarkable. The conus  terminates at L1-2.    T12-L1:  There is no significant central canal or neural foraminal  stenosis.  L1-2:  There is no significant central canal or neural foraminal  stenosis.  L2-3:  There is no significant central canal or neural foraminal  stenosis.  L3-4:  There is no significant central canal or neural foraminal  stenosis.  L4-5: Minimal disc bulging and degenerative facet arthropathy do not  narrow the central canal or left neuroforamen. There is minimal  neuroforaminal encroachment on the right.  L5-S1: Right greater than left degenerative facet arthropathy is  demonstrated. There is no significant central canal or neural  foraminal stenosis.    The prevertebral and posterior paraspinous soft tissues are  unremarkable. There is abnormal signal on T1 and T2 weighted imaging  along the sacroiliac joints bilaterally.    Impression  1. Findings suggestive of sacroiliitis, incompletely evaluated on the  basis of this examination.  2. Mild degenerative changes of the lumbar spine.      No image results found.       No diagnosis found.     ASSESSMENT/PLAN  Stan Bee is a 43 y.o. female with a past medical history of HTN, HLD, asthma, EDISON who presents for follow-up of chronic lower back pain.    Based on history, available imaging and physical exam, the patient's primary pain etiology is likely due to a combination of facet arthropathy, lumbar disc bulging, and weak musculature.       Our plan is as follows:  - Will schedule repeat bilateral lumbar transforaminal at L5.  Procedure, risk, benefits, alternatives reviewed with the patient.  - Will refer to PT/aquatherapy for the back.  Encouraged the patient to work on core strengthening as she is very weak musculature supporting the spine, multifidus muscles and paraspinals.  - Will renew tizanidine prescription.  Side effect profile and risk reviewed.  - Patient plans to see bariatric surgery to help with weight loss.  Encouraged weight reduction however we discussed that this may not reverse all of her back pain as she has some degenerative changes that are already there.  I do think weight loss and help prevent further progression and core strengthening will also be extremely important.       Chester French MD   PGY2 Anesthesiology

## 2024-05-23 ENCOUNTER — OFFICE VISIT (OUTPATIENT)
Dept: PRIMARY CARE | Facility: CLINIC | Age: 44
End: 2024-05-23
Payer: COMMERCIAL

## 2024-05-23 VITALS
WEIGHT: 293 LBS | DIASTOLIC BLOOD PRESSURE: 92 MMHG | OXYGEN SATURATION: 97 % | HEART RATE: 95 BPM | TEMPERATURE: 98 F | RESPIRATION RATE: 17 BRPM | SYSTOLIC BLOOD PRESSURE: 145 MMHG | BODY MASS INDEX: 52.7 KG/M2

## 2024-05-23 DIAGNOSIS — N61.0 CELLULITIS OF RIGHT BREAST: Primary | ICD-10-CM

## 2024-05-23 PROCEDURE — 99213 OFFICE O/P EST LOW 20 MIN: CPT | Performed by: STUDENT IN AN ORGANIZED HEALTH CARE EDUCATION/TRAINING PROGRAM

## 2024-05-23 PROCEDURE — 3077F SYST BP >= 140 MM HG: CPT | Performed by: STUDENT IN AN ORGANIZED HEALTH CARE EDUCATION/TRAINING PROGRAM

## 2024-05-23 PROCEDURE — 3080F DIAST BP >= 90 MM HG: CPT | Performed by: STUDENT IN AN ORGANIZED HEALTH CARE EDUCATION/TRAINING PROGRAM

## 2024-05-23 PROCEDURE — 3008F BODY MASS INDEX DOCD: CPT | Performed by: STUDENT IN AN ORGANIZED HEALTH CARE EDUCATION/TRAINING PROGRAM

## 2024-05-23 RX ORDER — SULFAMETHOXAZOLE AND TRIMETHOPRIM 800; 160 MG/1; MG/1
1 TABLET ORAL 2 TIMES DAILY
Qty: 14 TABLET | Refills: 0 | Status: SHIPPED | OUTPATIENT
Start: 2024-05-23 | End: 2024-05-30

## 2024-05-23 ASSESSMENT — ENCOUNTER SYMPTOMS
NAUSEA: 0
SHORTNESS OF BREATH: 0
NUMBNESS: 0
VOMITING: 0
WEAKNESS: 0
DIZZINESS: 0
SPEECH DIFFICULTY: 0
FEVER: 0
DEPRESSION: 0
CONSTIPATION: 0
WHEEZING: 0
DYSURIA: 0
HEMATURIA: 0
ABDOMINAL PAIN: 0
ACTIVITY CHANGE: 0
COUGH: 0

## 2024-05-23 NOTE — PROGRESS NOTES
Subjective   Patient ID: Stan Bee is a 43 y.o. female who presents for right breast pain   HPI  Radha is here for a follow up of her right breast pain   Reports had no releif from augmentin     She later took keflex for her toe infection and still has had no relief. No fever reproted  Past Medical History:   Diagnosis Date    Other specified disorders of cornea, bilateral 10/23/2016    Corneal irritation, bilateral    Personal history of malignant neoplasm of cervix uteri 2014    History of cervical cancer    Unspecified acute conjunctivitis, bilateral 10/23/2016    Acute bacterial conjunctivitis of both eyes      Past Surgical History:   Procedure Laterality Date    CERVICAL BIOPSY  W/ LOOP ELECTRODE EXCISION  2014    Cervical Loop Electrosurgical Excision (LEEP)     SECTION, CLASSIC  2013     Section     SECTION, CLASSIC  2013     Section     SECTION, CLASSIC  2017     Section    CHOLECYSTECTOMY  2013    Cholecystectomy    OTHER SURGICAL HISTORY  2021    Total hysterectomy with removal of both tubes and ovaries    OTHER SURGICAL HISTORY  2017    Cervix Cryosurgery    TUBAL LIGATION  2014    Tubal Ligation      Family History   Problem Relation Name Age of Onset    Other (mva) Father      Allergies Other      Asthma Other      Eczema Other        Allergies   Allergen Reactions    Bee Venom Protein (Honey Bee) Anaphylaxis    Latex Anaphylaxis, Hives and Other    Cyclobenzaprine Unknown     hallucinations    House Dust Unknown     Pollen and dust allergy      Causes watery eyes    Lisinopril Other          Occupation:     Review of Systems   Constitutional:  Negative for activity change and fever.   HENT:  Negative for congestion.    Respiratory:  Negative for cough, shortness of breath and wheezing.    Cardiovascular:  Negative for chest pain and leg swelling.   Gastrointestinal:  Negative for abdominal  pain, constipation, nausea and vomiting.   Endocrine: Negative for cold intolerance.   Genitourinary:  Negative for dysuria, hematuria and urgency.   Neurological:  Negative for dizziness, speech difficulty, weakness and numbness.   Psychiatric/Behavioral:  Negative for self-injury and suicidal ideas.        Objective   Visit Vitals  OB Status Hysterectomy   Smoking Status Some Days      Physical Exam  Constitutional:       Appearance: Normal appearance.   HENT:      Head: Normocephalic and atraumatic.      Nose: Nose normal.      Mouth/Throat:      Mouth: Mucous membranes are moist.   Eyes:      Conjunctiva/sclera: Conjunctivae normal.      Pupils: Pupils are equal, round, and reactive to light.   Cardiovascular:      Rate and Rhythm: Normal rate and regular rhythm.      Pulses: Normal pulses.      Heart sounds: Normal heart sounds.   Pulmonary:      Effort: Pulmonary effort is normal.      Breath sounds: Normal breath sounds.   Chest:      Comments: Right breast- erythem extending from lower part of right inner-upper quandrant to upper part of lower quadrant; mass + tendernss + ; no nipple discharge ; no fluctuancy    Musculoskeletal:         General: Normal range of motion.      Cervical back: Neck supple.   Skin:     General: Skin is warm.   Neurological:      General: No focal deficit present.      Mental Status: She is alert and oriented to person, place, and time.   Psychiatric:         Mood and Affect: Mood normal.         Behavior: Behavior normal.         Thought Content: Thought content normal.         Judgment: Judgment normal.         Assessment/Plan   Diagnoses and all orders for this visit:  Cellulitis of right breast  -     BI US breast complete bilateral; Future  -     Referral to Breast Surgery; Future  -     sulfamethoxazole-trimethoprim (Bactrim DS) 800-160 mg tablet; Take 1 tablet by mouth 2 times a day for 7 days.  Cellulitis righ tbreast. Mass + non fluctuant; induration+  Discussed benefits  risks of medications inc allergic reactions, diarrhoea, rash  Patient to call back if she experiences any     We will call the patient with abnormal labs if any and discuss necessary changes based on labs; otherwise patient to follow up in 3 months for HTN/ HLD  and in 1 year for next physical exam   Patient to seek medical attention immediately / call 911  if has worsening of symptoms  or develops alarm symptoms as discussed. Verbalizes understanding

## 2024-05-24 ENCOUNTER — APPOINTMENT (OUTPATIENT)
Dept: GASTROENTEROLOGY | Facility: HOSPITAL | Age: 44
End: 2024-05-24
Payer: COMMERCIAL

## 2024-05-28 NOTE — PROGRESS NOTES
Stan Bee female   1980 43 y.o.   [id number]      Chief Complaint  Right breast cellulitis    History Of Present Illness  Stan Bee is a very pleasant 43 y.o. AA female [facility] Jackie Luna for right breast cellulitis. She first noticed the lump about one month ago. She denies trauma to the breast. She denies breast surgery or biopsy. She was placed on antibiotics twice before and most recently placed on Bactrim twice daily for one week. She states she has two pills left. She states she has had no relief and the pain has increased. She rates her pain at a 9/10. She denies history of nipple piercing. She has family history of breast cancer in her mother, age 67. She denies breast surgery or biopsy. She has a history of cervical cancer treated with total hysterectomy age 36.     REPRODUCTIVE HISTORY: menarche age 12, , first birth age 17,  x 9 months, OCP's in the past, menopause age unknown (hysterectomy due to cervical cancer age 36), one ovary intact, scattered fibroglandular tissue    FAMILY CANCER HISTORY:   Mother: Breast cancer, age 67  Maternal Uncle: Pancreatic cancer, age 50    Surgical History  She has a past surgical history that includes Cholecystectomy (2013);  section, classic (2013);  section, classic (2013); Other surgical history (2021); Cervical biopsy w/ loop electrode excision (2014); Tubal ligation (2014);  section, classic (2017); Other surgical history (2017); and Hysterectomy.     Social History  She reports that she has been smoking cigarettes. She has never used smokeless tobacco. She reports current alcohol use. She reports that she does not use drugs.    Family History  Family History   Problem Relation Name Age of Onset    Breast cancer Mother      Other (mva) Father      Allergies Other      Asthma Other      Eczema Other          Allergies  Bee venom  protein (honey bee), Latex, Cyclobenzaprine, House dust, and Lisinopril    Medications  Current Outpatient Medications   Medication Instructions    albuterol 90 mcg/actuation inhaler 2 puffs, inhalation, Every 6 hours PRN    cephalexin (Keflex) 500 mg capsule     cetirizine (ZyrTEC) 10 mg tablet TAKE 1 TABLET BY MOUTH EVERY DAY    chlorthalidone (HYGROTON) 25 mg, oral, Daily    cholecalciferol (VITAMIN D3) 50 mcg, oral, Daily    ciprofloxacin (Ciloxan) 0.3 % ophthalmic solution Instill 1 to 2 drops into the conjunctival sac every 2 hours while awake for 2 days and 1 to 2 drops every 4 hours while awake for the next 5 days.    clobetasol (Temovate) 0.05 % cream 1 Application, Topical, 2 times daily    hydrocortisone 1 % cream APPLY SPARINGLY AND RUB IN WELL TO AFFECTED AREA(S) AS DIRECTED.    ketoconazole (NIZOral) 2 % cream 1 Application    losartan (COZAAR) 50 mg, oral, Daily    potassium chloride CR 10 mEq ER tablet 20 mEq, oral, Daily    sulfamethoxazole-trimethoprim (Bactrim DS) 800-160 mg tablet 1 tablet, oral, 2 times daily    tacrolimus (Protopic) 0.1 % ointment 1 Application    tiZANidine (ZANAFLEX) 4 mg, oral, 2 times daily PRN    triamcinolone (Kenalog) 0.1 % ointment Topical, 2 times daily PRN    Trulicity 4.5 mg, subcutaneous, Once Weekly    urea (Carmol) 10 % cream 1 Application    urea (Carmol) 40 % cream 1 Application    white petrolatum (Aquaphor Healing) 41 % ointment ointment 1 Application         REVIEW OF SYSTEMS    Constitutional:  Negative for appetite change, fatigue, fever and unexpected weight change.   HENT:  Negative for ear pain, hearing loss, nosebleeds, sore throat and trouble swallowing.    Eyes:  Negative for discharge, itching and visual disturbance.   Respiratory:  Negative for cough, chest tightness and shortness of breath.    Cardiovascular:  Negative for chest pain, palpitations and leg swelling.   Breast: as indicated in HPI  Gastrointestinal:  Negative for abdominal pain,  constipation, diarrhea and nausea.   Endocrine: Negative for cold intolerance and heat intolerance.   Genitourinary:  Negative for dysuria, frequency, hematuria, pelvic pain and vaginal bleeding.   Musculoskeletal:  Negative for arthralgias, back pain, gait problem, joint swelling and myalgias.   Skin:  Negative for color change and rash.   Allergic/Immunologic: Negative for environmental allergies and food allergies.   Neurological:  Negative for dizziness, tremors, speech difficulty, weakness, numbness and headaches.   Hematological:  Does not bruise/bleed easily.   Psychiatric/Behavioral:  Negative for agitation, dysphoric mood and sleep disturbance. The patient is not nervous/anxious.         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     Physical Exam  Patient is alert and oriented x3 and in a relaxed and appropriate mood. Her gait is steady and hand grasps are equal. Sclera is clear. The breasts are nearly symmetrical. The right breast, entire central quadrant with 9 x 11 cm hard non-mobile abscess. The area is very tender to touch. There is peau d'orange and erythema of the skin surrounding mass. There is a 3cm superficial shiny thin raised area of skin at 2:00, subareolar region likely abscess coming to the surface. There is no drainage at time of exam. The left breast tissue is soft without palpable abnormalities, discrete nodules or masses. The left breast skin and both nipples appear normal. There is no cervical, supraclavicular or left axillary lymphadenopathy. I am able to palpate a 2cm right axillary lymph node, likely reactive in nature. Heart rate and rhythm normal, S1 and S2 appreciated. The lungs are clear to auscultation bilaterally. Abdomen is soft and non-tender.       Physical Exam  Chest:               Last Recorded Vitals  Vitals:    05/30/24 1437   BP: 117/78   Pulse: 83   Temp: 36.7 °C (98.1 °F)       Relevant Results   Time was spent viewing digital images of the radiology testing with the patient. I explained the results in depth, along with suggested explanation for follow up recommendations based on the testing results. BI-RADS Category 2    Imaging  Narrative & Impression   Interpreted By:  Yonathan Agarwal,   STUDY:  BI US BREAST LIMITED RIGHT; BI MAMMO BILATERAL DIAGNOSTIC  TOMOSYNTHESIS;  5/30/2024 2:26 pm; 5/30/2024 2:03 pm      ACCESSION NUMBER(S):  [id number]; [id number]      ORDERING CLINICIAN:  SADIE MAYO      INDICATION:  Clinical signs of mastitis.      COMPARISON:  None      FINDINGS:  MAMMOGRAPHY: 2D and tomosynthesis images were reviewed at 1 mm slice  thickness.      Density:  There are areas of scattered fibroglandular tissue.      Within the right breast, there is a mass in the subareolar region  which could represent abscess. There is trabecular thickening  surrounding this area. There is skin thickening around the anterior  breast. Within the left breast, there are no suspicious masses,  calcifications, or distortion noted.      ULTRASOUND: Targeted ultrasound was performed of the  by a registered  sonographer with elastography. Wound evaluation was performed of the  area of concern. At the 3 o'clock position in the subareolar zone in  the area of palpable concern and pain which correlates with the  mammographic abnormality there is a collection identified. It  demonstrates significant internal echoes. The collection measures 4.7  x 3.9 x 4.1 cm. There is surrounding edema and hyperemia. The  overlying skin is thickened. The collection is predominantly soft on  elastography.      IMPRESSION:  Findings compatible with mastitis and abscess formation. Although the  collection does demonstrates significant internal echoes, an attempt  for drainage is recommended. The  patient will see Susan Zheng  today for surgical consultation with a subsequent attempt of image  guided aspiration.      Imaging follow-up after appropriate treatment can be obtained as  clinically indicated. If symptoms persist however, additional imaging  would be warranted.      BI-RADS CATEGORY:  BI-RADS Category:  2 Benign.  Recommendation:  Surgical Consultation.  Recommended Date:  Immediate.  Laterality:  Right.          Assessment/Plan   Stable clinical exam and imaging, right breast abscess, family history of breast cancer, scattered fibroglandular tissue    Plan: Right breast ultrasound guided aspiration of abscess. Send for culture, call with results to discuss recommendations for antibiotics.     Patient Discussion/Summary  Your clinical examination and imaging are stable. You have a right breast abscess. Please proceed to right breast ultrasound guided aspiration as discussed. We will send a culture of the fluid and I will call you with results to discuss recommendations.     You can see your health information, review clinical summaries from office visits & test results online when you follow your health with MY  Chart, a personal health record. To sign up go to [email or web address]. If you need assistance with signing up or trouble getting into your account call [employer] Patient Line 24/7 at [phone].    My office phone number is [phone] if you need to get in touch with me or have additional questions or concerns. Thank you for choosing [facility] and trusting me as your healthcare provider. I look forward to seeing you again at your next office visit. I am honored to be a provider on your health care team and I remain dedicated to helping you achieve your health goals.      Susan Zheng, APRN-CNP

## 2024-05-29 ENCOUNTER — APPOINTMENT (OUTPATIENT)
Dept: RADIOLOGY | Facility: HOSPITAL | Age: 44
End: 2024-05-29
Payer: COMMERCIAL

## 2024-05-30 ENCOUNTER — HOSPITAL ENCOUNTER (OUTPATIENT)
Dept: RADIOLOGY | Facility: HOSPITAL | Age: 44
Discharge: HOME | End: 2024-05-30
Payer: COMMERCIAL

## 2024-05-30 ENCOUNTER — HOSPITAL ENCOUNTER (OUTPATIENT)
Dept: RADIOLOGY | Facility: CLINIC | Age: 44
Discharge: HOME | End: 2024-05-30
Payer: COMMERCIAL

## 2024-05-30 ENCOUNTER — OFFICE VISIT (OUTPATIENT)
Dept: SURGICAL ONCOLOGY | Facility: CLINIC | Age: 44
End: 2024-05-30
Payer: COMMERCIAL

## 2024-05-30 VITALS
WEIGHT: 293 LBS | BODY MASS INDEX: 50.02 KG/M2 | HEART RATE: 83 BPM | SYSTOLIC BLOOD PRESSURE: 117 MMHG | HEIGHT: 64 IN | TEMPERATURE: 98.1 F | DIASTOLIC BLOOD PRESSURE: 78 MMHG

## 2024-05-30 VITALS — WEIGHT: 293 LBS | HEIGHT: 64 IN | BODY MASS INDEX: 50.02 KG/M2

## 2024-05-30 DIAGNOSIS — L02.91 ABSCESS: ICD-10-CM

## 2024-05-30 DIAGNOSIS — L02.91 ABSCESS: Primary | ICD-10-CM

## 2024-05-30 DIAGNOSIS — N61.0 CELLULITIS OF RIGHT BREAST: ICD-10-CM

## 2024-05-30 DIAGNOSIS — R92.8 ABNORMAL MAMMOGRAM OF RIGHT BREAST: Primary | ICD-10-CM

## 2024-05-30 PROCEDURE — 76642 ULTRASOUND BREAST LIMITED: CPT | Mod: RT

## 2024-05-30 PROCEDURE — 99213 OFFICE O/P EST LOW 20 MIN: CPT | Performed by: NURSE PRACTITIONER

## 2024-05-30 PROCEDURE — 87185 SC STD ENZYME DETCJ PER NZM: CPT | Mod: AHULAB | Performed by: STUDENT IN AN ORGANIZED HEALTH CARE EDUCATION/TRAINING PROGRAM

## 2024-05-30 PROCEDURE — 77066 DX MAMMO INCL CAD BI: CPT | Mod: RIGHT SIDE | Performed by: RADIOLOGY

## 2024-05-30 PROCEDURE — 3078F DIAST BP <80 MM HG: CPT | Performed by: NURSE PRACTITIONER

## 2024-05-30 PROCEDURE — 76642 ULTRASOUND BREAST LIMITED: CPT | Mod: RIGHT SIDE | Performed by: RADIOLOGY

## 2024-05-30 PROCEDURE — 19000 PUNCTURE ASPIR CYST BREAST: CPT | Mod: RT

## 2024-05-30 PROCEDURE — 77062 BREAST TOMOSYNTHESIS BI: CPT | Mod: RIGHT SIDE | Performed by: RADIOLOGY

## 2024-05-30 PROCEDURE — 76982 USE 1ST TARGET LESION: CPT | Mod: RT

## 2024-05-30 PROCEDURE — 2500000005 HC RX 250 GENERAL PHARMACY W/O HCPCS: Performed by: STUDENT IN AN ORGANIZED HEALTH CARE EDUCATION/TRAINING PROGRAM

## 2024-05-30 PROCEDURE — 99203 OFFICE O/P NEW LOW 30 MIN: CPT | Performed by: NURSE PRACTITIONER

## 2024-05-30 PROCEDURE — 3074F SYST BP LT 130 MM HG: CPT | Performed by: NURSE PRACTITIONER

## 2024-05-30 PROCEDURE — 77062 BREAST TOMOSYNTHESIS BI: CPT

## 2024-05-30 PROCEDURE — 3008F BODY MASS INDEX DOCD: CPT | Performed by: NURSE PRACTITIONER

## 2024-05-30 RX ADMIN — Medication 10 ML: at 15:40

## 2024-05-30 ASSESSMENT — PAIN SCALES - GENERAL
PAINLEVEL_OUTOF10: 9
PAINLEVEL_OUTOF10: 9
PAINLEVEL_OUTOF10: 7
PAINLEVEL: 9
PAINLEVEL_OUTOF10: 9

## 2024-05-30 ASSESSMENT — PAIN - FUNCTIONAL ASSESSMENT: PAIN_FUNCTIONAL_ASSESSMENT: 0-10

## 2024-05-30 NOTE — PATIENT INSTRUCTIONS
Your clinical examination and imaging are stable. You have a right breast abscess. Please proceed to right breast ultrasound guided aspiration as discussed. We will send a culture of the fluid and I will call you with results to discuss recommendations.     You can see your health information, review clinical summaries from office visits & test results online when you follow your health with MY  Chart, a personal health record. To sign up go to [email or web address]. If you need assistance with signing up or trouble getting into your account call [employer] Patient Line 24/7 at [phone].    My office phone number is [phone] if you need to get in touch with me or have additional questions or concerns. Thank you for choosing [facility] and trusting me as your healthcare provider. I look forward to seeing you again at your next office visit. I am honored to be a provider on your health care team and I remain dedicated to helping you achieve your health goals.

## 2024-06-02 LAB
B-LACTAMASE ORGANISM ISLT: POSITIVE
BACTERIA SPEC CULT: ABNORMAL
GRAM STN SPEC: ABNORMAL
GRAM STN SPEC: ABNORMAL

## 2024-06-03 ENCOUNTER — APPOINTMENT (OUTPATIENT)
Dept: SURGERY | Facility: CLINIC | Age: 44
End: 2024-06-03
Payer: COMMERCIAL

## 2024-06-05 ENCOUNTER — TELEPHONE (OUTPATIENT)
Dept: SURGERY | Facility: HOSPITAL | Age: 44
End: 2024-06-05
Payer: COMMERCIAL

## 2024-06-05 NOTE — TELEPHONE ENCOUNTER
Left two messages on voicemail regarding right breast cultures results from ultrasound aspiration. Asked for return call.

## 2024-06-26 NOTE — PROGRESS NOTES
Stan Bee female   1980 43 y.o.   [id number]      Chief Complaint  Right breast abscess    History Of Present Illness  Stan Bee is a very pleasant 43 y.o. AA female [facility] right breast cellulitis/abscess. She first presented on 2024. She noticed the lump in May 2024. She denies trauma to the breast. She denies breast surgery or biopsy. She was placed on three different antibiotics without relief and the pain increased (Cephalexin, Augmentin and Bactrim). She denies history of nipple piercing. She has family history of breast cancer in her mother, age 67. She denies breast surgery or biopsy. She has a history of cervical cancer treated with total hysterectomy age 36.     She had an ultrasound guided aspiration with 35 ml purulent fluid removed. Culture was sent demonstrating 4+ abundant polymorphonuclear leukocytes and 4+ mixed gram positive and negative bacteria. She states it improved slightly, but has since worsened. She denies any drainage up to this point. She is not diabetic.     REPRODUCTIVE HISTORY: menarche age 12, , first birth age 17,  x 9 months, OCP's in the past, menopause age unknown (hysterectomy due to cervical cancer age 36), one ovary intact, scattered fibroglandular tissue    FAMILY CANCER HISTORY:   Mother: Breast cancer, age 67  Maternal Uncle: Pancreatic cancer, age 50    Surgical History  She has a past surgical history that includes Cholecystectomy (2013);  section, classic (2013);  section, classic (2013); Other surgical history (2021); Cervical biopsy w/ loop electrode excision (2014); Tubal ligation (2014);  section, classic (2017); Other surgical history (2017); and Hysterectomy.     Social History  She reports that she has been smoking cigarettes. She has never used smokeless tobacco. She reports current alcohol use. She reports that she does not use  drugs.    Family History  Family History   Problem Relation Name Age of Onset    Breast cancer Mother      Other (mva) Father      Allergies Other      Asthma Other      Eczema Other          Allergies  Bee venom protein (honey bee), Latex, Cyclobenzaprine, House dust, and Lisinopril    Medications  Current Outpatient Medications   Medication Instructions    albuterol 90 mcg/actuation inhaler 2 puffs, inhalation, Every 6 hours PRN    cephalexin (Keflex) 500 mg capsule     cetirizine (ZyrTEC) 10 mg tablet TAKE 1 TABLET BY MOUTH EVERY DAY    chlorthalidone (HYGROTON) 25 mg, oral, Daily    cholecalciferol (VITAMIN D3) 50 mcg, oral, Daily    ciprofloxacin (Ciloxan) 0.3 % ophthalmic solution Instill 1 to 2 drops into the conjunctival sac every 2 hours while awake for 2 days and 1 to 2 drops every 4 hours while awake for the next 5 days.    clindamycin (CLEOCIN HCL) 300 mg, oral, 3 times daily    clobetasol (Temovate) 0.05 % cream 1 Application, Topical, 2 times daily    hydrocortisone 1 % cream APPLY SPARINGLY AND RUB IN WELL TO AFFECTED AREA(S) AS DIRECTED.    ketoconazole (NIZOral) 2 % cream 1 Application    losartan (COZAAR) 50 mg, oral, Daily    potassium chloride CR 10 mEq ER tablet 20 mEq, oral, Daily    tacrolimus (Protopic) 0.1 % ointment 1 Application    tiZANidine (ZANAFLEX) 4 mg, oral, 2 times daily PRN    Trulicity 4.5 mg, subcutaneous, Once Weekly    urea (Carmol) 10 % cream 1 Application    urea (Carmol) 40 % cream 1 Application    white petrolatum (Aquaphor Healing) 41 % ointment ointment 1 Application         REVIEW OF SYSTEMS    Constitutional:  Negative for appetite change, fatigue, fever and unexpected weight change.   HENT:  Negative for ear pain, hearing loss, nosebleeds, sore throat and trouble swallowing.    Eyes:  Negative for discharge, itching and visual disturbance.   Respiratory:  Negative for cough, chest tightness and shortness of breath.    Cardiovascular:  Negative for chest pain,  palpitations and leg swelling.   Breast: as indicated in HPI  Gastrointestinal:  Negative for abdominal pain, constipation, diarrhea and nausea.   Endocrine: Negative for cold intolerance and heat intolerance.   Genitourinary:  Negative for dysuria, frequency, hematuria, pelvic pain and vaginal bleeding.   Musculoskeletal:  Negative for arthralgias, back pain, gait problem, joint swelling and myalgias.   Skin:  Negative for color change and rash.   Allergic/Immunologic: Negative for environmental allergies and food allergies.   Neurological:  Negative for dizziness, tremors, speech difficulty, weakness, numbness and headaches.   Hematological:  Does not bruise/bleed easily.   Psychiatric/Behavioral:  Negative for agitation, dysphoric mood and sleep disturbance. The patient is not nervous/anxious.         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     Physical Exam  Patient is alert and oriented x3 and in a relaxed and appropriate mood. Her gait is steady and hand grasps are equal. Sclera is clear. The breasts are nearly symmetrical. The right breast, central quadrant with 8 x 10 cm firm non-mobile abscess. The area is tender to touch. There is peau d'orange and erythema of the skin surrounding the mass. There is a 2cm superficial translucent thin raised area of skin subareolar region likely abscess coming to the surface. There is no drainage at time of exam. There is no cervical, supraclavicular or right axillary lymphadenopathy.    Physical Exam  Chest:              Last Recorded Vitals  Vitals:    06/27/24 1116   BP: 139/81   Pulse: 90   SpO2: 98%         Relevant Results   Time was spent discussing digital images of the radiology testing with the patient. I explained the results in depth, along with suggested  explanation for follow up recommendations based on the testing results. BI-RADS Category 2    Imaging  Narrative & Impression   Interpreted By:  Yonathan Agarwal,   STUDY:  BI US BREAST LIMITED RIGHT; BI MAMMO BILATERAL DIAGNOSTIC  TOMOSYNTHESIS;  5/30/2024 2:26 pm; 5/30/2024 2:03 pm      ACCESSION NUMBER(S):  [id number]; [id number]      ORDERING CLINICIAN:  SADIE MAYO      INDICATION:  Clinical signs of mastitis.      COMPARISON:  None      FINDINGS:  MAMMOGRAPHY: 2D and tomosynthesis images were reviewed at 1 mm slice  thickness.      Density:  There are areas of scattered fibroglandular tissue.      Within the right breast, there is a mass in the subareolar region  which could represent abscess. There is trabecular thickening  surrounding this area. There is skin thickening around the anterior  breast. Within the left breast, there are no suspicious masses,  calcifications, or distortion noted.      ULTRASOUND: Targeted ultrasound was performed of the  by a registered  sonographer with elastography. Wound evaluation was performed of the  area of concern. At the 3 o'clock position in the subareolar zone in  the area of palpable concern and pain which correlates with the  mammographic abnormality there is a collection identified. It  demonstrates significant internal echoes. The collection measures 4.7  x 3.9 x 4.1 cm. There is surrounding edema and hyperemia. The  overlying skin is thickened. The collection is predominantly soft on  elastography.      IMPRESSION:  Findings compatible with mastitis and abscess formation. Although the  collection does demonstrates significant internal echoes, an attempt  for drainage is recommended. The patient will see Susan Zheng  today for surgical consultation with a subsequent attempt of image  guided aspiration.      Imaging follow-up after appropriate treatment can be obtained as  clinically indicated. If symptoms persist however, additional imaging  would be  warranted.      BI-RADS CATEGORY:  BI-RADS Category:  2 Benign.  Recommendation:  Surgical Consultation.  Recommended Date:  Immediate.  Laterality:  Right.          Assessment/Plan   Stable clinical exam and imaging, right breast abscess, family history of breast cancer, scattered fibroglandular tissue    Plan: Discussed attempting another antibiotic versus ultrasound and aspiration if possible. Elected for antibiotic. Start Clindamycin 300mg by mouth TID for two weeks. Asked patient to contact office next week to discuss any improvement. If no improvement, may consider Doxycycline. Also, discussed possibility of I&D. May also need to rule out Granulomatous Mastitis. Return in 2-3 weeks for clinical follow up.     Patient Discussion/Summary  Your clinical examination and imaging are stable. You have a right breast abscess. Please start Clindamycin 300mg by mouth three times a day for two weeks. Please contact my office next week to discuss any improvement. Return in 2-3 weeks for clinical follow up.     You can see your health information, review clinical summaries from office visits & test results online when you follow your health with MY  Chart, a personal health record. To sign up go to [email or web address]. If you need assistance with signing up or trouble getting into your account call [employer] Patient Line 24/7 at [phone].    My office phone number is [phone] if you need to get in touch with me or have additional questions or concerns. Thank you for choosing [facility] and trusting me as your healthcare provider. I look forward to seeing you again at your next office visit. I am honored to be a provider on your health care team and I remain dedicated to helping you achieve your health goals.      Susan Zheng, APRN-CNP

## 2024-06-27 ENCOUNTER — OFFICE VISIT (OUTPATIENT)
Dept: SURGICAL ONCOLOGY | Facility: CLINIC | Age: 44
End: 2024-06-27
Payer: COMMERCIAL

## 2024-06-27 VITALS
WEIGHT: 293 LBS | SYSTOLIC BLOOD PRESSURE: 139 MMHG | HEART RATE: 90 BPM | OXYGEN SATURATION: 98 % | BODY MASS INDEX: 54.19 KG/M2 | DIASTOLIC BLOOD PRESSURE: 81 MMHG

## 2024-06-27 DIAGNOSIS — L02.91 ABSCESS: Primary | ICD-10-CM

## 2024-06-27 PROCEDURE — 99213 OFFICE O/P EST LOW 20 MIN: CPT | Performed by: NURSE PRACTITIONER

## 2024-06-27 PROCEDURE — 3079F DIAST BP 80-89 MM HG: CPT | Performed by: NURSE PRACTITIONER

## 2024-06-27 PROCEDURE — 3075F SYST BP GE 130 - 139MM HG: CPT | Performed by: NURSE PRACTITIONER

## 2024-06-27 RX ORDER — CLINDAMYCIN HYDROCHLORIDE 300 MG/1
300 CAPSULE ORAL 3 TIMES DAILY
Qty: 42 CAPSULE | Refills: 0 | Status: SHIPPED | OUTPATIENT
Start: 2024-06-27 | End: 2024-07-11

## 2024-06-27 ASSESSMENT — PAIN SCALES - GENERAL: PAINLEVEL: 0-NO PAIN

## 2024-06-27 NOTE — PATIENT INSTRUCTIONS
Your clinical examination and imaging are stable. You have a right breast abscess. Please start Clindamycin 300mg by mouth three times a day for two weeks. Please contact my office next week to discuss any improvement. Return in 2-3 weeks for clinical follow up.     You can see your health information, review clinical summaries from office visits & test results online when you follow your health with MY  Chart, a personal health record. To sign up go to [email or web address]. If you need assistance with signing up or trouble getting into your account call [employer] Patient Line 24/7 at [phone].    My office phone number is [phone] if you need to get in touch with me or have additional questions or concerns. Thank you for choosing [facility] and trusting me as your healthcare provider. I look forward to seeing you again at your next office visit. I am honored to be a provider on your health care team and I remain dedicated to helping you achieve your health goals.

## 2024-07-02 ENCOUNTER — TELEPHONE (OUTPATIENT)
Dept: SURGICAL ONCOLOGY | Facility: HOSPITAL | Age: 44
End: 2024-07-02
Payer: COMMERCIAL

## 2024-07-02 DIAGNOSIS — L02.91 ABSCESS: Primary | ICD-10-CM

## 2024-07-02 RX ORDER — PREDNISONE 10 MG/1
TABLET ORAL
Qty: 138 TABLET | Refills: 0 | Status: SHIPPED | OUTPATIENT
Start: 2024-07-02

## 2024-07-02 NOTE — TELEPHONE ENCOUNTER
Stan called stating that the pain is getting worse in her breast. She does not believe what she was given to help with abscess is working. It has been 4-5 days she said and it is getting worse.  She would like a phone call. [phone]

## 2024-07-02 NOTE — PROGRESS NOTES
Spoke with Stan regarding right breast possible abscess/infection. I am concerned for granulomatous mastitis and explained a biopsy is necessary for diagnosis. I have asked her to stop the antibiotic as her symptoms have not improved and the pain is worsening. We agreed to start the Prednisone regimen to treat GM until biopsy results are back.

## 2024-07-03 ENCOUNTER — TELEPHONE (OUTPATIENT)
Dept: RADIOLOGY | Facility: HOSPITAL | Age: 44
End: 2024-07-03
Payer: COMMERCIAL

## 2024-07-03 NOTE — TELEPHONE ENCOUNTER
Called patient prior to breast aspiration/breast biopsy for 7/5/24: reviewed procedure, does well with lidocaine, no falls recently, no blood thinners:  understands that she can eat prior to procedure:

## 2024-07-05 ENCOUNTER — HOSPITAL ENCOUNTER (OUTPATIENT)
Dept: RADIOLOGY | Facility: HOSPITAL | Age: 44
Discharge: HOME | End: 2024-07-05
Payer: COMMERCIAL

## 2024-07-05 DIAGNOSIS — N63.41 SUBAREOLAR MASS OF RIGHT BREAST: Primary | ICD-10-CM

## 2024-07-05 DIAGNOSIS — R92.8 OTHER ABNORMAL AND INCONCLUSIVE FINDINGS ON DIAGNOSTIC IMAGING OF BREAST: ICD-10-CM

## 2024-07-05 DIAGNOSIS — N63.41 UNSPECIFIED LUMP IN RIGHT BREAST, SUBAREOLAR: ICD-10-CM

## 2024-07-05 PROCEDURE — 19000 PUNCTURE ASPIR CYST BREAST: CPT | Mod: RT

## 2024-07-05 PROCEDURE — A4648 IMPLANTABLE TISSUE MARKER: HCPCS

## 2024-07-05 PROCEDURE — 2720000007 HC OR 272 NO HCPCS

## 2024-07-05 PROCEDURE — 19083 BX BREAST 1ST LESION US IMAG: CPT | Mod: RT

## 2024-07-05 PROCEDURE — 77065 DX MAMMO INCL CAD UNI: CPT

## 2024-07-05 PROCEDURE — C1819 TISSUE LOCALIZATION-EXCISION: HCPCS

## 2024-07-05 PROCEDURE — 2500000005 HC RX 250 GENERAL PHARMACY W/O HCPCS: Performed by: STUDENT IN AN ORGANIZED HEALTH CARE EDUCATION/TRAINING PROGRAM

## 2024-07-05 ASSESSMENT — PAIN SCALES - GENERAL
PAINLEVEL_OUTOF10: 3

## 2024-07-05 ASSESSMENT — PAIN - FUNCTIONAL ASSESSMENT: PAIN_FUNCTIONAL_ASSESSMENT: 0-10

## 2024-07-05 NOTE — Clinical Note
Procedural steps explained and patient given opportunity to verbalize concerns and seek clarification.  Post procedure self-care and potential for bruising , hematoma, and pain reviewed.  Patient verbalizes understanding.  Reviewed allergies, feels safe at home , no recent falls or fear of falling, okay with lidocaine, denies use of blood thinning medeications Patient offered aromatherapy, warm blankets and music. Guided imagery, touch  and relaxation breathing to be used throughout the procedure.  Pt positioned area scanned, and marked by Dr. Perez, lidocaine given, area aspirated, additional 7 ml lidocaine given  for biopsy  @1419 , biopsy completed , pressure held for 15 min biopsy site without bleeding steri strips and DSD with paper tape applied, on admission pt having  nipple drainage, stated area drained large amount of brown green drainage last pm, now small am ount of drainage via nipple, one area on nipple with less than 3 ml sero sang drainage ,post  biopsy  2x2 and paper tape over that site as well, clip films completed and okayed, observed site, bra and ice applied,Reviewed discharge instructions, given pamphlet , visit summary , provider and nurse cards with pt. And daughter, pt states has a direct number to Susan Norm, instructed to call for signs of infection, increasing ,pain swe lling,skin breakdown,  bleeding to apply pressure and if does not stop in 10 min to hold pressure and go to the ER, given DSD  to keep area dry. Verbalizing understanding, no questions, discharge @1515

## 2024-07-09 LAB
LABORATORY COMMENT REPORT: NORMAL
LABORATORY COMMENT REPORT: NORMAL
PATH REPORT.FINAL DX SPEC: NORMAL
PATH REPORT.GROSS SPEC: NORMAL
PATH REPORT.RELEVANT HX SPEC: NORMAL
PATH REPORT.TOTAL CANCER: NORMAL
RESIDENT REVIEW: NORMAL

## 2024-07-12 ENCOUNTER — TELEPHONE (OUTPATIENT)
Dept: SURGERY | Facility: HOSPITAL | Age: 44
End: 2024-07-12
Payer: COMMERCIAL

## 2024-07-12 LAB
LAB AP ASR DISCLAIMER: NORMAL
LABORATORY COMMENT REPORT: NORMAL
PATH REPORT.FINAL DX SPEC: NORMAL
PATH REPORT.GROSS SPEC: NORMAL
PATH REPORT.RELEVANT HX SPEC: NORMAL
PATH REPORT.TOTAL CANCER: NORMAL

## 2024-07-12 NOTE — TELEPHONE ENCOUNTER
Spoke with Stan. Biopsy demonstrated acute and chronic inflammation suggestive of an abscess. Granulomatous mastitis was not seen. Asked Stan to stop the steroids at this time and return for clinical exam in one week. She will contact office if sym[toms worsen.

## 2024-07-13 ENCOUNTER — TELEPHONE (OUTPATIENT)
Dept: SURGERY | Facility: HOSPITAL | Age: 44
End: 2024-07-13
Payer: COMMERCIAL

## 2024-07-13 NOTE — TELEPHONE ENCOUNTER
Spoke with Stan again and decided to taper the steroid. She has not completed the initial regimen, but will taper to be cautious. Encouraged patient to cut current dosage in half for one week and then in a quarter for following week. Then she can discontinue. [facility] two weeks.

## 2024-07-16 ENCOUNTER — APPOINTMENT (OUTPATIENT)
Dept: BEHAVIORAL HEALTH | Facility: CLINIC | Age: 44
End: 2024-07-16
Payer: COMMERCIAL

## 2024-07-23 DIAGNOSIS — I10 PRIMARY HYPERTENSION: ICD-10-CM

## 2024-07-25 DIAGNOSIS — I10 PRIMARY HYPERTENSION: ICD-10-CM

## 2024-07-25 RX ORDER — CHLORTHALIDONE 25 MG/1
25 TABLET ORAL DAILY
Qty: 90 TABLET | Refills: 0 | Status: SHIPPED | OUTPATIENT
Start: 2024-07-25 | End: 2024-07-25 | Stop reason: SDUPTHER

## 2024-07-25 RX ORDER — CHLORTHALIDONE 25 MG/1
25 TABLET ORAL DAILY
Qty: 90 TABLET | Refills: 0 | Status: SHIPPED | OUTPATIENT
Start: 2024-07-25 | End: 2024-10-23

## 2024-08-28 NOTE — PROGRESS NOTES
Stan Bee female   1980 44 y.o.   [id number]      Chief Complaint  Right breast abscess    History Of Present Illness  Stan Bee is a very pleasant 44 y.o. AA female [facility] for right breast cellulitis/abscess. She first presented on 2024. She noticed the lump in May 2024. She denies trauma to the breast. She denies breast surgery. She was placed on three different antibiotics without relief and the pain increased (Cephalexin, Augmentin and Bactrim). She denies history of nipple piercing. She has family history of breast cancer in her mother, age 67. She denies breast surgery or biopsy. She has a history of cervical cancer treated with total hysterectomy age 36.     She had an ultrasound guided aspiration with 35 ml purulent fluid removed. Culture was sent demonstrating 4+ abundant polymorphonuclear leukocytes and 4+ mixed gram positive and negative bacteria. She is not diabetic.     The abscess did not resolve and biopsy was performed to rule out granulomatous mastitis. Results benign abscess. She took a steroid for three weeks with significant improvement.     She presents today stating     REPRODUCTIVE HISTORY: menarche age 12, , first birth age 17,  x 9 months, OCP's in the past, menopause age unknown (hysterectomy due to cervical cancer age 36), one ovary intact, scattered fibroglandular tissue    FAMILY CANCER HISTORY:   Mother: Breast cancer, age 67  Maternal Uncle: Pancreatic cancer, age 50    Surgical History  She has a past surgical history that includes Cholecystectomy (2013);  section, classic (2013);  section, classic (2013); Other surgical history (2021); Cervical biopsy w/ loop electrode excision (2014); Tubal ligation (2014);  section, classic (2017); Other surgical history (2017); and Hysterectomy.     Social History  She reports that she has been smoking cigarettes. She  has never used smokeless tobacco. She reports current alcohol use. She reports that she does not use drugs.    Family History  Family History   Problem Relation Name Age of Onset    Breast cancer Mother      Other (mva) Father      Allergies Other      Asthma Other      Eczema Other          Allergies  Bee venom protein (honey bee), Latex, Cyclobenzaprine, House dust, and Lisinopril    Medications  Current Outpatient Medications   Medication Instructions    albuterol 90 mcg/actuation inhaler 2 puffs, inhalation, Every 6 hours PRN    cephalexin (Keflex) 500 mg capsule     cetirizine (ZyrTEC) 10 mg tablet TAKE 1 TABLET BY MOUTH EVERY DAY    chlorthalidone (HYGROTON) 25 mg, oral, Daily    cholecalciferol (VITAMIN D3) 50 mcg, oral, Daily    ciprofloxacin (Ciloxan) 0.3 % ophthalmic solution Instill 1 to 2 drops into the conjunctival sac every 2 hours while awake for 2 days and 1 to 2 drops every 4 hours while awake for the next 5 days.    clobetasol (Temovate) 0.05 % cream 1 Application, Topical, 2 times daily    hydrocortisone 1 % cream APPLY SPARINGLY AND RUB IN WELL TO AFFECTED AREA(S) AS DIRECTED.    ketoconazole (NIZOral) 2 % cream 1 Application    losartan (COZAAR) 50 mg, oral, Daily    potassium chloride CR 10 mEq ER tablet 20 mEq, oral, Daily    predniSONE (Deltasone) 10 mg tablet Weeks 1-2: 30 mg (3 tabs) PO BID x14 days. Week 3: 20mg (2 tabs) PO BID x7 days. Week 4: 10mg (1 tab) PO BID x7 days. Week 5: 5 mg (1/2 tab) PO BID x7 days. Week 6: 5 mg (1/2 tab) PO daily x7 days, D/C    tacrolimus (Protopic) 0.1 % ointment 1 Application    tiZANidine (ZANAFLEX) 4 mg, oral, 2 times daily PRN    Trulicity 4.5 mg, subcutaneous, Once Weekly    urea (Carmol) 10 % cream 1 Application    urea (Carmol) 40 % cream 1 Application    white petrolatum (Aquaphor Healing) 41 % ointment ointment 1 Application         REVIEW OF SYSTEMS    Constitutional:  Negative for appetite change, fatigue, fever and unexpected weight change.    HENT:  Negative for ear pain, hearing loss, nosebleeds, sore throat and trouble swallowing.    Eyes:  Negative for discharge, itching and visual disturbance.   Respiratory:  Negative for cough, chest tightness and shortness of breath.    Cardiovascular:  Negative for chest pain, palpitations and leg swelling.   Breast: as indicated in HPI  Gastrointestinal:  Negative for abdominal pain, constipation, diarrhea and nausea.   Endocrine: Negative for cold intolerance and heat intolerance.   Genitourinary:  Negative for dysuria, frequency, hematuria, pelvic pain and vaginal bleeding.   Musculoskeletal:  Negative for arthralgias, back pain, gait problem, joint swelling and myalgias.   Skin:  Negative for color change and rash.   Allergic/Immunologic: Negative for environmental allergies and food allergies.   Neurological:  Negative for dizziness, tremors, speech difficulty, weakness, numbness and headaches.   Hematological:  Does not bruise/bleed easily.   Psychiatric/Behavioral:  Negative for agitation, dysphoric mood and sleep disturbance. The patient is not nervous/anxious.         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     Physical Exam  Patient is alert and oriented x3 and in a relaxed and appropriate mood. Her gait is steady and hand grasps are equal. Sclera is clear. The breasts are nearly symmetrical. The right breast, central quadrant with 8 x 10 cm firm non-mobile abscess. The area is tender to touch. There is peau d'orange and erythema of the skin surrounding the mass. There is a 2cm superficial translucent thin raised area of skin subareolar region likely abscess coming to the surface. There is no drainage at time of exam. There is no cervical, supraclavicular or right axillary  lymphadenopathy.    Physical Exam  Chest:              Last Recorded Vitals  There were no vitals filed for this visit.        Relevant Results   Time was spent discussing digital images of the radiology testing with the patient. I explained the results in depth, along with suggested explanation for follow up recommendations based on the testing results. BI-RADS Category 2    Imaging  Narrative & Impression   Interpreted By:  Yonathan Agarwal,   STUDY:  BI US BREAST LIMITED RIGHT; BI MAMMO BILATERAL DIAGNOSTIC  TOMOSYNTHESIS;  5/30/2024 2:26 pm; 5/30/2024 2:03 pm      ACCESSION NUMBER(S):  [id number]; [id number]      ORDERING CLINICIAN:  SADIE MAYO      INDICATION:  Clinical signs of mastitis.      COMPARISON:  None      FINDINGS:  MAMMOGRAPHY: 2D and tomosynthesis images were reviewed at 1 mm slice  thickness.      Density:  There are areas of scattered fibroglandular tissue.      Within the right breast, there is a mass in the subareolar region  which could represent abscess. There is trabecular thickening  surrounding this area. There is skin thickening around the anterior  breast. Within the left breast, there are no suspicious masses,  calcifications, or distortion noted.      ULTRASOUND: Targeted ultrasound was performed of the  by a registered  sonographer with elastography. Wound evaluation was performed of the  area of concern. At the 3 o'clock position in the subareolar zone in  the area of palpable concern and pain which correlates with the  mammographic abnormality there is a collection identified. It  demonstrates significant internal echoes. The collection measures 4.7  x 3.9 x 4.1 cm. There is surrounding edema and hyperemia. The  overlying skin is thickened. The collection is predominantly soft on  elastography.      IMPRESSION:  Findings compatible with mastitis and abscess formation. Although the  collection does demonstrates significant internal echoes, an attempt  for drainage is  recommended. The patient will see Susan Zheng  today for surgical consultation with a subsequent attempt of image  guided aspiration.      Imaging follow-up after appropriate treatment can be obtained as  clinically indicated. If symptoms persist however, additional imaging  would be warranted.      BI-RADS CATEGORY:  BI-RADS Category:  2 Benign.  Recommendation:  Surgical Consultation.  Recommended Date:  Immediate.  Laterality:  Right.          Assessment/Plan   Stable clinical exam and imaging, right breast abscess, family history of breast cancer, scattered fibroglandular tissue    Plan: Discussed attempting another antibiotic versus ultrasound and aspiration if possible. Elected for antibiotic. Start Clindamycin 300mg by mouth TID for two weeks. Asked patient to contact office next week to discuss any improvement. If no improvement, may consider Doxycycline. Also, discussed possibility of I&D. May also need to rule out Granulomatous Mastitis. Return in 2-3 weeks for clinical follow up.     Patient Discussion/Summary  Your clinical examination and imaging are stable. You have a right breast abscess. Please start Clindamycin 300mg by mouth three times a day for two weeks. Please contact my office next week to discuss any improvement. Return in 2-3 weeks for clinical follow up.     You can see your health information, review clinical summaries from office visits & test results online when you follow your health with MY  Chart, a personal health record. To sign up go to [email or web address]. If you need assistance with signing up or trouble getting into your account call [facility] Patient Line 24/7 at [phone].    My office phone number is [phone] if you need to get in touch with me or have additional questions or concerns. Thank you for choosing [facility] and trusting me as your healthcare provider. I look forward to seeing you again at your next office visit. I am honored to be a  provider on your health care team and I remain dedicated to helping you achieve your health goals.      Susan Zheng, APRN-CNP

## 2024-08-29 ENCOUNTER — APPOINTMENT (OUTPATIENT)
Dept: SURGICAL ONCOLOGY | Facility: CLINIC | Age: 44
End: 2024-08-29
Payer: COMMERCIAL

## 2024-10-03 ENCOUNTER — OFFICE VISIT (OUTPATIENT)
Dept: PAIN MEDICINE | Facility: HOSPITAL | Age: 44
End: 2024-10-03
Payer: COMMERCIAL

## 2024-10-03 DIAGNOSIS — M54.16 LUMBAR RADICULOPATHY: ICD-10-CM

## 2024-10-03 DIAGNOSIS — M54.16 LUMBAR RADICULITIS: Primary | ICD-10-CM

## 2024-10-03 PROCEDURE — 99213 OFFICE O/P EST LOW 20 MIN: CPT | Performed by: ANESTHESIOLOGY

## 2024-10-03 RX ORDER — TRAMADOL HYDROCHLORIDE 50 MG/1
50 TABLET ORAL 3 TIMES DAILY PRN
Qty: 21 TABLET | Refills: 0 | Status: SHIPPED | OUTPATIENT
Start: 2024-10-03 | End: 2024-10-10

## 2024-10-03 ASSESSMENT — PAIN SCALES - GENERAL: PAINLEVEL: 7

## 2024-10-05 ENCOUNTER — HOSPITAL ENCOUNTER (OUTPATIENT)
Dept: RADIOLOGY | Facility: HOSPITAL | Age: 44
Discharge: HOME | End: 2024-10-05
Payer: COMMERCIAL

## 2024-10-05 DIAGNOSIS — M72.2 PLANTAR FASCIAL FIBROMATOSIS: ICD-10-CM

## 2024-10-05 PROCEDURE — 73721 MRI JNT OF LWR EXTRE W/O DYE: CPT | Mod: RIGHT SIDE | Performed by: RADIOLOGY

## 2024-10-05 PROCEDURE — 73721 MRI JNT OF LWR EXTRE W/O DYE: CPT | Mod: RT

## 2024-10-06 NOTE — PROGRESS NOTES
Chief Complaint   Patient presents with    Back Pain    Extremity Pain      HPI Ms. Bee is a 44-year-old female with a history of back pain.  The patient was last seen for an injection which was a right biased L5-S1 intralaminar epidural which was done on 2023.  The patient reports 80% or more reduction in pain that lasted from that injection through April and had last followed up in the office on May 7 as she had some recurrence of her pain and had wanted to pursue a repeat epidural but it was declined by her insurance.  The patient has been keeping up with physical therapy and core strengthening exercises, physician directed exercises which were given and are a [place] based exercise program at least 3-4 times a week and has done so since she was last seen in May.  We discussed at that time the importance of keeping up with an exercise program.  She notes that she has bilateral low back pain with radicular symptoms down the leg.  The patient has difficult time with standing, walking, any kind of activity.  Sitting for too long can also be bothersome.  She has tried Tylenol, anti-inflammatories, and tizanidine.    ROS: 13 point review of systems is complete and is negative listed above in HPI    Past Medical History:   Diagnosis Date    Other specified disorders of cornea, bilateral 10/23/2016    Corneal irritation, bilateral    Personal history of malignant neoplasm of cervix uteri 2014    History of cervical cancer    Unspecified acute conjunctivitis, bilateral 10/23/2016    Acute bacterial conjunctivitis of both eyes       Past Surgical History:   Procedure Laterality Date    CERVICAL BIOPSY  W/ LOOP ELECTRODE EXCISION  2014    Cervical Loop Electrosurgical Excision (LEEP)     SECTION, CLASSIC  2013     Section     SECTION, CLASSIC  2013     Section     SECTION, CLASSIC  2017     Section    CHOLECYSTECTOMY  2013     Cholecystectomy    HYSTERECTOMY      OTHER SURGICAL HISTORY  08/05/2021    Total hysterectomy with removal of both tubes and ovaries    OTHER SURGICAL HISTORY  06/19/2017    Cervix Cryosurgery    TUBAL LIGATION  11/12/2014    Tubal Ligation       Family History   Problem Relation Name Age of Onset    Breast cancer Mother      Other (mva) Father      Allergies Other      Asthma Other      Eczema Other         Social History     Tobacco Use    Smoking status: Some Days     Types: Cigarettes    Smokeless tobacco: Never   Substance Use Topics    Alcohol use: Yes     Comment: occasionally;soically    Drug use: Never       Current Outpatient Medications on File Prior to Visit   Medication Sig Dispense Refill    albuterol 90 mcg/actuation inhaler Inhale 2 puffs every 6 hours if needed.      cephalexin (Keflex) 500 mg capsule       cetirizine (ZyrTEC) 10 mg tablet TAKE 1 TABLET BY MOUTH EVERY DAY 30 tablet 1    chlorthalidone (Hygroton) 25 mg tablet Take 1 tablet (25 mg) by mouth once daily. 90 tablet 0    cholecalciferol (Vitamin D3) 50 MCG (2000 UT) tablet Take 1 tablet (50 mcg) by mouth once daily. 30 tablet 11    ciprofloxacin (Ciloxan) 0.3 % ophthalmic solution Instill 1 to 2 drops into the conjunctival sac every 2 hours while awake for 2 days and 1 to 2 drops every 4 hours while awake for the next 5 days. (Patient not taking: Reported on 5/30/2024) 10 mL 0    clobetasol (Temovate) 0.05 % cream Apply 1 Application topically twice a day.      dulaglutide (Trulicity) 4.5 mg/0.5 mL pen injector Inject 4.5 mg under the skin 1 (one) time per week. (Patient not taking: Reported on 5/30/2024) 2 mL 11    hydrocortisone 1 % cream APPLY SPARINGLY AND RUB IN WELL TO AFFECTED AREA(S) AS DIRECTED.      ketoconazole (NIZOral) 2 % cream 1 Application      losartan (Cozaar) 50 mg tablet Take 1 tablet (50 mg) by mouth once daily. 90 tablet 2    potassium chloride CR 10 mEq ER tablet TAKE 2 TABLETS (20 MEQ) BY MOUTH ONCE DAILY. 180  tablet 1    predniSONE (Deltasone) 10 mg tablet Weeks 1-2: 30 mg (3 tabs) PO BID x14 days. Week 3: 20mg (2 tabs) PO BID x7 days. Week 4: 10mg (1 tab) PO BID x7 days. Week 5: 5 mg (1/2 tab) PO BID x7 days. Week 6: 5 mg (1/2 tab) PO daily x7 days, D/C 138 tablet 0    tacrolimus (Protopic) 0.1 % ointment 1 Application.      tiZANidine (Zanaflex) 4 mg tablet Take 1 tablet (4 mg) by mouth 2 times a day as needed for muscle spasms. 60 tablet 0    urea (Carmol) 10 % cream 1 Application.      urea (Carmol) 40 % cream 1 Application.      white petrolatum (Aquaphor Healing) 41 % ointment ointment 1 Application.       No current facility-administered medications on file prior to visit.        Allergies   Allergen Reactions    Bee Venom Protein (Honey Bee) Anaphylaxis    Latex Anaphylaxis, Hives and Other    Cyclobenzaprine Unknown     hallucinations    House Dust Unknown     Pollen and dust allergy      Causes watery eyes    Lisinopril Other          Imaging:  Narrative & Impression   Interpreted By:  Vijaya Moody,   STUDY:  MRI of the lumbar spine without IV contrast;  3/15/2024 10:17 am      INDICATION:  Signs/Symptoms:pain.      COMPARISON:  10/18/2021.      ACCESSION NUMBER(S):  [id number]      ORDERING CLINICIAN:  LUCIAN STEVENS      TECHNIQUE:  Sagittal and axial STIR and T1-weighted MRI images of the lumbar  spine were acquired using a spondylolysis protocol.  No contrast was  administered.      FINDINGS:  For counting purposes the last lumbarized vertebral body is labeled  L5.      Alignment, Vertebral body heights and marrow signal pattern are  within normal limits.      Minimal disc height loss at T9-T10 T10-T11. Intervertebral disc  spaces are otherwise maintained.      The conus terminates at L1-L2 and is unremarkable.      Prevertebral soft tissues are not thickened.      Evaluation by level:      There are degenerative changes in the lower thoracic spine without  significant spinal canal stenosis. At least  mild neural foraminal  narrowing at T10-T11. No axial images were performed through this  level.      T11-T12: No spinal canal or neural foraminal stenosis      T12-L1: No spinal canal or neural foraminal stenosis      L1-L2: No spinal canal or neural foraminal stenosis      L2-L3: Minimal right eccentric disc bulge and facet arthrosis. No  spinal canal or neural foraminal stenosis      L3-L4: No spinal canal or neural foraminal stenosis.      L4-L5: Mild disc bulge and facet arthrosis. No spinal canal stenosis.  Mild neural foraminal narrowing.      L5-S1: Mild disc bulge and facet arthrosis. No spinal canal stenosis.  Mild neural foraminal stenosis.      IMPRESSION:  Mild multilevel degenerative changes without significant spinal canal  stenosis. Mild neural foraminal narrowing at L4-L5 and L5-S1.      There are degenerative changes in the lower thoracic spine without  significant spinal canal stenosis. At least mild neural foraminal  narrowing at T10-T11. No axial images were performed through this  level.       Physical Exam:  Gen.: No acute distress  Eyes: Pupils are symmetric  ENT: Hearing is grossly intact  Respiratory: No gasping or shortness of breath   Skin: No rashes or open lesions or ulcers identified on the skin  Musculoskeletal: Gait is grossly normal, positive SLR on right side only, positive Hanh, negative compression test, negative Gaenslen's  Neurologic: Cranial nerves II through XII are grossly intact  Psychiatric:  Patient is alert and oriented x3    Impression/Plan:  44-year-old female with degenerative disc disease, spondylosis, with radiating pains down the extremities in an L5/S1 distribution on right side greater than left.  Previously she responded with more than 80% relief that lasted more than 4 months.  She also has sacroiliitis which is known based on imaging however right now her pain is more consistent with radicular symptoms rather than SI related pain.    -Will plan for repeat  lumbar epidural as this gave more than 80% reduction in pain for more than 4 months.  She has tried conservative measures since May including a [place] based exercise program that was given and she has participated in at least 3-4 times a week ongoing since May in addition to medication management.  Will target the L5-S1 level where she has wear-and-tear changes and radicular symptoms that are concordant to that distribution.    -May consider SI joint injection in the future if that pain recurs however on exam at this time she does not meet criteria for that issue.    -Will plan for keeping up with ongoing exercise program and core strengthening.

## 2024-10-10 ENCOUNTER — APPOINTMENT (OUTPATIENT)
Dept: PRIMARY CARE | Facility: CLINIC | Age: 44
End: 2024-10-10
Payer: COMMERCIAL

## 2024-10-21 ENCOUNTER — HOSPITAL ENCOUNTER (OUTPATIENT)
Facility: HOSPITAL | Age: 44
Discharge: HOME | End: 2024-10-21
Payer: COMMERCIAL

## 2024-10-21 VITALS
TEMPERATURE: 97.9 F | HEART RATE: 83 BPM | RESPIRATION RATE: 18 BRPM | SYSTOLIC BLOOD PRESSURE: 126 MMHG | DIASTOLIC BLOOD PRESSURE: 79 MMHG | OXYGEN SATURATION: 99 %

## 2024-10-21 DIAGNOSIS — M54.16 LUMBAR RADICULOPATHY: ICD-10-CM

## 2024-10-21 PROCEDURE — 62323 NJX INTERLAMINAR LMBR/SAC: CPT | Performed by: ANESTHESIOLOGY

## 2024-10-21 PROCEDURE — 2550000001 HC RX 255 CONTRASTS: Performed by: ANESTHESIOLOGY

## 2024-10-21 PROCEDURE — 2500000004 HC RX 250 GENERAL PHARMACY W/ HCPCS (ALT 636 FOR OP/ED): Performed by: ANESTHESIOLOGY

## 2024-10-21 RX ORDER — METHYLPREDNISOLONE ACETATE 40 MG/ML
INJECTION, SUSPENSION INTRA-ARTICULAR; INTRALESIONAL; INTRAMUSCULAR; SOFT TISSUE
Status: COMPLETED | OUTPATIENT
Start: 2024-10-21 | End: 2024-10-21

## 2024-10-21 RX ORDER — LIDOCAINE HYDROCHLORIDE 5 MG/ML
INJECTION, SOLUTION INFILTRATION; INTRAVENOUS
Status: COMPLETED | OUTPATIENT
Start: 2024-10-21 | End: 2024-10-21

## 2024-10-21 RX ORDER — METHYLPREDNISOLONE ACETATE 40 MG/ML
INJECTION, SUSPENSION INTRA-ARTICULAR; INTRALESIONAL; INTRAMUSCULAR; SOFT TISSUE
Status: DISCONTINUED
Start: 2024-10-21 | End: 2024-10-22 | Stop reason: HOSPADM

## 2024-10-21 RX ORDER — LIDOCAINE HYDROCHLORIDE 5 MG/ML
INJECTION, SOLUTION INFILTRATION; INTRAVENOUS
Status: DISCONTINUED
Start: 2024-10-21 | End: 2024-10-22 | Stop reason: HOSPADM

## 2024-10-21 ASSESSMENT — PAIN SCALES - GENERAL
PAINLEVEL_OUTOF10: 10 - WORST POSSIBLE PAIN
PAINLEVEL_OUTOF10: 0 - NO PAIN
PAINLEVEL_OUTOF10: 5 - MODERATE PAIN

## 2024-10-21 ASSESSMENT — PAIN - FUNCTIONAL ASSESSMENT
PAIN_FUNCTIONAL_ASSESSMENT: 0-10
PAIN_FUNCTIONAL_ASSESSMENT: 0-10
PAIN_FUNCTIONAL_ASSESSMENT: WONG-BAKER FACES

## 2024-10-21 NOTE — DISCHARGE INSTRUCTIONS
DISCHARGE INSTRUCTIONS FOR INJECTIONS     You underwent Lumbar Inter-Laminar Epidural Steroid Injection today    After most injections, it is recommended that you relax and limit your activity for the remainder of the day unless you have been told otherwise by your pain physician.  You should not drive a car, operate machinery, or make important legal decisions unless otherwise directed by your pain physician.  You may resume your normal activity, including exercise, tomorrow.      Keep a written pain diary of how much pain relief you experienced following the injection procedure and the length of time of pain relief you experienced pain relief. Following diagnostic injections like medial branch nerve blocks, sacroiliac joint blocks, stellate ganglion injections and other blocks, it is very important you record the specific amount of pain relief you experienced immediately after the injectionand how long it lasted. Your doctor will ask you for this information at your follow up visit.     For all injections, please keep the injection site dry and inspect the site for a couple of days. You may remove the Band-Aid the day of the injection at any time.     Some discomfort, bruising or slight swelling may occur at the injection site. This is not abnormal if it occurs.  If needed you may:    -Take over the counter medication such as Tylenol or Motrin.   -Apply an ice pack for 30 minutes, 2 to 3 times a day for the first 24 hours.     You may shower today; no soaking baths, hot tubs, whirlpools or swimming pools for two days.      If you are given steroids in your injection, it may take 3-5 days for the steroid medication to take effect. You may notice a worsening of your symptoms for 1-2 days after the injection. This is not abnormal.  You may use acetaminophen, ibuprofen, or prescription medication that your doctor may have prescribed for you if you need to do so.     A few common side effects of steroids include facial  flushing, sweating, restlessness, irritability,difficulty sleeping, increase in blood sugar, and increased blood pressure. If you have diabetes, please monitor your blood sugar at least once a day for at least 5 days. If you have poorly controlled high blood pressure, monitoryour blood pressure for at least 2 days and contact your primary care physician if these numbers are unusually high for you.      If you take aspirin or non-steroidal anti-inflammatory drugs (examples are Motrin, Advil, ibuprofen, Naprosyn, Voltaren, Relafen, etc.) you may restart these this evening, but stop taking it 3 days before your next appointment, unless instructed otherwiseby your physician.      You do not need to discontinue non-aspirin-containing pain medications prior to an injection (examples: Celebrex, tramadol, hydrocodone and acetaminophen).      If you take a blood thinning medication (Coumadin, Lovenox, Fragmin,Ticlid, Plavix, Pradaxa, etc.), please discuss this with your primary care physician/cardiologist and your pain physician. These medications MUST be discontinued before you can have an injection safely, without the risk of uncontrolled bleeding. If these medications are not discontinued for an appropriate period of time, you will not be able to receivean injection. Please adhere to instructions given to you about when to restart your blood thinning medication. If you have any questions please reach out to our team.    If you are taking Coumadin, please have your INR checked the morning of your procedure and bring the result to your appointment unless otherwise instructed. If your INR is over 1.2, your injection may need to be rescheduled to avoid uncontrolled bleeding from the needle placement.     Call [facility]  and ask for Pain Management at [phone] between 8am-4pm Monday - Friday if you are experiencing the following:    If you received an epidural or spinal injection:    -Headache that doesnot go away with  medicine, is worse when sitting or standing up, and is greatly relieved upon lying down.   -Severe pain worse than or different than your baseline pain.   -Chills or fever (101º F or greater).   -Drainage or signs of infection at the injection site     Go directly to the Emergency Department if you are experiencing the following and received an epidural or spinal injection:   -Abrupt weakness or progressive weakness in your legs that starts after you leave the clinic.   -Abrupt severe or worsening numbness in your legs.   -Inability to urinate after the injection or loss of bowel or bladder control without the urge to defecate or urinate.     If you have a clinical question that cannot wait until your next appointment, please call [phone] between 8am-4pm Monday - Friday or send a [employer] message. We do our best to return all non-emergency messages within 24 hours, Monday - Friday. A nurse or physician will return your message. You may also the appropriate nurse below, and they will do their best to answer your questions.  - For Dr. Matt, call Jonelle at [phone]  - For Dr. Lopez, call Leona at [phone]  - For Dr. Issa, call Leona at [phone]  - For Dr. Mendoza, call Summer at [phone]  - For Dr. Ashraf, call Summer at [phone]    If you need to cancel an appointment, please call the scheduling staff at [phone] during normal business hours or leave a message at least 24 hours in advance.     If you are going to be sedated for your next procedure, you MUST have responsible adult who can legally drive accompany you home. You cannot eat or drink for at least eight hours prior to the planned procedure if you are going to receive sedation. You may take your non-blood thinning medications with a small sip of water.

## 2024-10-21 NOTE — H&P
HISTORY AND PHYSICAL    History Of Present Illness  Stan Bee is a 44 y.o. female presenting with chronic pain here for procedure as stated below. Patient denies any changes to health since the last visit to our clinic.    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    Corneal irritation, bilateral    Personal history of malignant neoplasm of cervix uteri 2014    History of cervical cancer    Unspecified acute conjunctivitis, bilateral 10/23/2016    Acute bacterial conjunctivitis of both eyes       Surgical History  Past Surgical History:   Procedure Laterality Date    CERVICAL BIOPSY  W/ LOOP ELECTRODE EXCISION  2014    Cervical Loop Electrosurgical Excision (LEEP)     SECTION, CLASSIC  2013     Section     SECTION, CLASSIC  2013     Section     SECTION, CLASSIC  2017     Section    CHOLECYSTECTOMY  2013    Cholecystectomy    HYSTERECTOMY      OTHER SURGICAL HISTORY  2021    Total hysterectomy with removal of both tubes and ovaries    OTHER SURGICAL HISTORY  2017    Cervix Cryosurgery    TUBAL LIGATION  2014    Tubal Ligation        Social History  She reports that she has been smoking cigarettes. She has never used smokeless tobacco. She reports current alcohol use. She reports that she does not use drugs.    Family History  Family History   Problem Relation Name Age of Onset    Breast cancer Mother      Other (mva) Father      Allergies Other      Asthma Other      Eczema Other          Allergies  Bee venom protein (honey bee), Latex, Cyclobenzaprine, House dust, and Lisinopril    Review of Systems  12 point ROS done and negative except for the above.     Physical Exam  General: NAD, well groomed, well nourished  Eyes: Non-icteric sclera, EOMI  Ears, Nose, Mouth, and Throat: External ears and nose appear to be without deformity or  rash. No lesions or masses noted. Hearing is grossly intact.   Neck: Trachea midline  Respiratory: Nonlabored breathing   Cardiovascular: No peripheral edema   Skin: No rashes or open lesions/ulcers identified on skin.      Last Recorded Vitals  There were no vitals taken for this visit.    Relevant Results  Current Outpatient Medications   Medication Instructions    albuterol 90 mcg/actuation inhaler 2 puffs, inhalation, Every 6 hours PRN    cephalexin (Keflex) 500 mg capsule     cetirizine (ZyrTEC) 10 mg tablet TAKE 1 TABLET BY MOUTH EVERY DAY    chlorthalidone (HYGROTON) 25 mg, oral, Daily    cholecalciferol (VITAMIN D3) 50 mcg, oral, Daily    ciprofloxacin (Ciloxan) 0.3 % ophthalmic solution Instill 1 to 2 drops into the conjunctival sac every 2 hours while awake for 2 days and 1 to 2 drops every 4 hours while awake for the next 5 days.    clobetasol (Temovate) 0.05 % cream 1 Application, Topical, 2 times daily    hydrocortisone 1 % cream APPLY SPARINGLY AND RUB IN WELL TO AFFECTED AREA(S) AS DIRECTED.    ketoconazole (NIZOral) 2 % cream 1 Application    losartan (COZAAR) 50 mg, oral, Daily    potassium chloride CR 10 mEq ER tablet 20 mEq, oral, Daily    predniSONE (Deltasone) 10 mg tablet Weeks 1-2: 30 mg (3 tabs) PO BID x14 days. Week 3: 20mg (2 tabs) PO BID x7 days. Week 4: 10mg (1 tab) PO BID x7 days. Week 5: 5 mg (1/2 tab) PO BID x7 days. Week 6: 5 mg (1/2 tab) PO daily x7 days, D/C    tacrolimus (Protopic) 0.1 % ointment 1 Application    tiZANidine (ZANAFLEX) 4 mg, oral, 2 times daily PRN    Trulicity 4.5 mg, subcutaneous, Once Weekly    urea (Carmol) 10 % cream 1 Application    urea (Carmol) 40 % cream 1 Application    white petrolatum (Aquaphor Healing) 41 % ointment ointment 1 Application         MR lumbar spine wo IV contrast 03/15/2024    Narrative  Interpreted By:  Vijaya Moody,  STUDY:  MRI of the lumbar spine without IV contrast;  3/15/2024 10:17  am    INDICATION:  Signs/Symptoms:pain.    COMPARISON:  10/18/2021.    ACCESSION NUMBER(S):  [id number]    ORDERING CLINICIAN:  LUCIAN STEVENS    TECHNIQUE:  Sagittal and axial STIR and T1-weighted MRI images of the lumbar  spine were acquired using a spondylolysis protocol.  No contrast was  administered.    FINDINGS:  For counting purposes the last lumbarized vertebral body is labeled  L5.    Alignment, Vertebral body heights and marrow signal pattern are  within normal limits.    Minimal disc height loss at T9-T10 T10-T11. Intervertebral disc  spaces are otherwise maintained.    The conus terminates at L1-L2 and is unremarkable.    Prevertebral soft tissues are not thickened.    Evaluation by level:    There are degenerative changes in the lower thoracic spine without  significant spinal canal stenosis. At least mild neural foraminal  narrowing at T10-T11. No axial images were performed through this  level.    T11-T12: No spinal canal or neural foraminal stenosis    T12-L1: No spinal canal or neural foraminal stenosis    L1-L2: No spinal canal or neural foraminal stenosis    L2-L3: Minimal right eccentric disc bulge and facet arthrosis. No  spinal canal or neural foraminal stenosis    L3-L4: No spinal canal or neural foraminal stenosis.    L4-L5: Mild disc bulge and facet arthrosis. No spinal canal stenosis.  Mild neural foraminal narrowing.    L5-S1: Mild disc bulge and facet arthrosis. No spinal canal stenosis.  Mild neural foraminal stenosis.    Impression  Mild multilevel degenerative changes without significant spinal canal  stenosis. Mild neural foraminal narrowing at L4-L5 and L5-S1.    There are degenerative changes in the lower thoracic spine without  significant spinal canal stenosis. At least mild neural foraminal  narrowing at T10-T11. No axial images were performed through this  level.    I personally reviewed the images/study and I agree with the findings  as stated. This study was interpreted  at [place], [place].    MACRO:  None    Signed by: Vijaya Moody 3/15/2024 10:48 AM  Dictation workstation:   [id number]     No image results found.     No diagnosis found.        Assessment/Plan   Stan Bee is a 44 y.o. female presenting with chronic pain here for right biased L5-S1 intralaminar epidural; she denies any recent antibiotic use or infections, she denies any blood thinner use , and she denies contrast or local anesthetic allergies.    ASA PS Classification: 2  Mallampati score: 2    Plan:  - We will proceed with the procedure as above. We discussed extensively the risks, benefits, and alternatives to the procedure. The patient's questions were addressed and answered in detail. The patient demonstrated understanding of the procedure, and is amenable to proceeding with it. The Risks of the procedure that were discussed with the patient include but are not limited to the following: A lack of efficacy, transient worsening of pain, bleeding, infection, nerve injury, nerve damage, neuritis or sunburn sensation. Informed consent obtained as attached to EMR.  - Patient to continue physician directed home exercises as tolerated.  - Patient will follow-up with us in clinic.      Mickey Larry MD  Chronic Pain Fellow  [facility]

## 2024-11-08 ENCOUNTER — APPOINTMENT (OUTPATIENT)
Dept: PREADMISSION TESTING | Facility: HOSPITAL | Age: 44
End: 2024-11-08
Payer: COMMERCIAL

## 2024-11-13 NOTE — PROGRESS NOTES
Stan Bee female   1980 44 y.o.   [id number]      Chief Complaint  Right breast abscess    History Of Present Illness  Stan Bee is a very pleasant 44 y.o. AA female [facility] breast cellulitis/abscess. She first presented on 2024. She noticed the lump in May 2024. She denies trauma to the breast. She denies breast surgery. She was placed on three different antibiotics without relief and the pain increased (Cephalexin, Augmentin and Bactrim). She denies history of nipple piercing. She has family history of breast cancer in her mother, age 67. y. She has a history of cervical cancer treated with total hysterectomy age 36.     She had an ultrasound guided aspiration with 35 ml purulent fluid removed. Culture was sent demonstrating 4+ abundant polymorphonuclear leukocytes and 4+ mixed gram positive and negative bacteria. She states it improved slightly, but has since worsened. She denies any drainage up to this point. She is not diabetic.     REPRODUCTIVE HISTORY: menarche age 12, , first birth age 17,  x 9 months, OCP's in the past, menopause age unknown (hysterectomy due to cervical cancer age 36), one ovary intact, scattered fibroglandular tissue    FAMILY CANCER HISTORY:   Mother: Breast cancer, age 67  Maternal Uncle: Pancreatic cancer, age 50    Surgical History  She has a past surgical history that includes Cholecystectomy (2013);  section, classic (2013);  section, classic (2013); Other surgical history (2021); Cervical biopsy w/ loop electrode excision (2014); Tubal ligation (2014);  section, classic (2017); Other surgical history (2017); and Hysterectomy.     Social History  She reports that she has been smoking cigarettes. She has never used smokeless tobacco. She reports current alcohol use. She reports that she does not use drugs.    Family History  Family History    Problem Relation Name Age of Onset    Breast cancer Mother      Other (mva) Father      Allergies Other      Asthma Other      Eczema Other          Allergies  Bee venom protein (honey bee), Latex, Cyclobenzaprine, House dust, and Lisinopril    Medications  Current Outpatient Medications   Medication Instructions    albuterol 90 mcg/actuation inhaler 2 puffs, Every 6 hours PRN    cetirizine (ZyrTEC) 10 mg tablet TAKE 1 TABLET BY MOUTH EVERY DAY    chlorthalidone (HYGROTON) 25 mg, oral, Daily    cholecalciferol (VITAMIN D3) 50 mcg, oral, Daily    ciprofloxacin (Ciloxan) 0.3 % ophthalmic solution Instill 1 to 2 drops into the conjunctival sac every 2 hours while awake for 2 days and 1 to 2 drops every 4 hours while awake for the next 5 days.    clobetasol (Temovate) 0.05 % cream 1 Application, 2 times daily    hydrocortisone 1 % cream APPLY SPARINGLY AND RUB IN WELL TO AFFECTED AREA(S) AS DIRECTED.    ketoconazole (NIZOral) 2 % cream 1 Application    losartan (COZAAR) 50 mg, oral, Daily    potassium chloride CR 10 mEq ER tablet 20 mEq, oral, Daily    predniSONE (Deltasone) 10 mg tablet Weeks 1-2: 30 mg (3 tabs) PO BID x14 days. Week 3: 20mg (2 tabs) PO BID x7 days. Week 4: 10mg (1 tab) PO BID x7 days. Week 5: 5 mg (1/2 tab) PO BID x7 days. Week 6: 5 mg (1/2 tab) PO daily x7 days, D/C    tacrolimus (Protopic) 0.1 % ointment 1 Application    tiZANidine (ZANAFLEX) 4 mg, oral, 2 times daily PRN    urea (Carmol) 10 % cream 1 Application    urea (Carmol) 40 % cream 1 Application    white petrolatum (Aquaphor Healing) 41 % ointment ointment 1 Application         REVIEW OF SYSTEMS    Constitutional:  Negative for appetite change, fatigue, fever and unexpected weight change.   HENT:  Negative for ear pain, hearing loss, nosebleeds, sore throat and trouble swallowing.    Eyes:  Negative for discharge, itching and visual disturbance.   Respiratory:  Negative for cough, chest tightness and shortness of breath.     Cardiovascular:  Negative for chest pain, palpitations and leg swelling.   Breast: as indicated in HPI  Gastrointestinal:  Negative for abdominal pain, constipation, diarrhea and nausea.   Endocrine: Negative for cold intolerance and heat intolerance.   Genitourinary:  Negative for dysuria, frequency, hematuria, pelvic pain and vaginal bleeding.   Musculoskeletal:  Negative for arthralgias, back pain, gait problem, joint swelling and myalgias.   Skin:  Negative for color change and rash.   Allergic/Immunologic: Negative for environmental allergies and food allergies.   Neurological:  Negative for dizziness, tremors, speech difficulty, weakness, numbness and headaches.   Hematological:  Does not bruise/bleed easily.   Psychiatric/Behavioral:  Negative for agitation, dysphoric mood and sleep disturbance. The patient is not nervous/anxious.         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     Physical Exam  Patient is alert and oriented x3 and in a relaxed and appropriate mood. Her gait is steady and hand grasps are equal. Sclera is clear. The breasts are nearly symmetrical. The right breast, central quadrant with 8 x 10 cm firm non-mobile abscess. The area is tender to touch. There is peau d'orange and erythema of the skin surrounding the mass. There is a 2cm superficial translucent thin raised area of skin subareolar region likely abscess coming to the surface. There is no drainage at time of exam. There is no cervical, supraclavicular or right axillary lymphadenopathy.    Physical Exam  Chest:              Last Recorded Vitals  There were no vitals filed for this visit.        Relevant Results   Time was spent discussing digital images of the radiology testing with the patient. I explained the results in depth,  along with suggested explanation for follow up recommendations based on the testing results. BI-RADS Category 2    Imaging  Narrative & Impression   Interpreted By:  Yonathan Agarwal,   STUDY:  BI US BREAST LIMITED RIGHT; BI MAMMO BILATERAL DIAGNOSTIC  TOMOSYNTHESIS;  5/30/2024 2:26 pm; 5/30/2024 2:03 pm      ACCESSION NUMBER(S):  [id number]; [id number]      ORDERING CLINICIAN:  SADIE MAYO      INDICATION:  Clinical signs of mastitis.      COMPARISON:  None      FINDINGS:  MAMMOGRAPHY: 2D and tomosynthesis images were reviewed at 1 mm slice  thickness.      Density:  There are areas of scattered fibroglandular tissue.      Within the right breast, there is a mass in the subareolar region  which could represent abscess. There is trabecular thickening  surrounding this area. There is skin thickening around the anterior  breast. Within the left breast, there are no suspicious masses,  calcifications, or distortion noted.      ULTRASOUND: Targeted ultrasound was performed of the  by a registered  sonographer with elastography. Wound evaluation was performed of the  area of concern. At the 3 o'clock position in the subareolar zone in  the area of palpable concern and pain which correlates with the  mammographic abnormality there is a collection identified. It  demonstrates significant internal echoes. The collection measures 4.7  x 3.9 x 4.1 cm. There is surrounding edema and hyperemia. The  overlying skin is thickened. The collection is predominantly soft on  elastography.      IMPRESSION:  Findings compatible with mastitis and abscess formation. Although the  collection does demonstrates significant internal echoes, an attempt  for drainage is recommended. The patient will see Susan Zheng  today for surgical consultation with a subsequent attempt of image  guided aspiration.      Imaging follow-up after appropriate treatment can be obtained as  clinically indicated. If symptoms persist however,  additional imaging  would be warranted.      BI-RADS CATEGORY:  BI-RADS Category:  2 Benign.  Recommendation:  Surgical Consultation.  Recommended Date:  Immediate.  Laterality:  Right.          Assessment/Plan   Stable clinical exam and imaging, right breast abscess, family history of breast cancer, scattered fibroglandular tissue    Plan: Discussed attempting another antibiotic versus ultrasound and aspiration if possible. Elected for antibiotic. Start Clindamycin 300mg by mouth TID for two weeks. Asked patient to contact office next week to discuss any improvement. If no improvement, may consider Doxycycline. Also, discussed possibility of I&D. May also need to rule out Granulomatous Mastitis. Return in 2-3 weeks for clinical follow up.     Patient Discussion/Summary  Your clinical examination and imaging are stable. You have a right breast abscess. Please start Clindamycin 300mg by mouth three times a day for two weeks. Please contact my office next week to discuss any improvement. Return in 2-3 weeks for clinical follow up.     You can see your health information, review clinical summaries from office visits & test results online when you follow your health with MY  Chart, a personal health record. To sign up go to [email or web address]. If you need assistance with signing up or trouble getting into your account call [employer] Patient Line 24/7 at [phone].    My office phone number is [phone] if you need to get in touch with me or have additional questions or concerns. Thank you for choosing [facility] and trusting me as your healthcare provider. I look forward to seeing you again at your next office visit. I am honored to be a provider on your health care team and I remain dedicated to helping you achieve your health goals.      Susan Zheng, APRN-CNP

## 2024-11-15 ENCOUNTER — PRE-ADMISSION TESTING (OUTPATIENT)
Dept: PREADMISSION TESTING | Facility: HOSPITAL | Age: 44
End: 2024-11-15
Payer: COMMERCIAL

## 2024-11-15 ENCOUNTER — LAB (OUTPATIENT)
Dept: LAB | Facility: LAB | Age: 44
End: 2024-11-15
Payer: COMMERCIAL

## 2024-11-15 VITALS
DIASTOLIC BLOOD PRESSURE: 85 MMHG | TEMPERATURE: 98.6 F | BODY MASS INDEX: 50.02 KG/M2 | WEIGHT: 293 LBS | HEIGHT: 64 IN | SYSTOLIC BLOOD PRESSURE: 153 MMHG | OXYGEN SATURATION: 98 % | HEART RATE: 95 BPM

## 2024-11-15 DIAGNOSIS — R73.9 HYPERGLYCEMIA: ICD-10-CM

## 2024-11-15 DIAGNOSIS — R53.83 OTHER FATIGUE: ICD-10-CM

## 2024-11-15 DIAGNOSIS — Z01.818 PRE-OP EXAMINATION: ICD-10-CM

## 2024-11-15 DIAGNOSIS — Z01.818 PRE-OP EXAMINATION: Primary | ICD-10-CM

## 2024-11-15 LAB
ANION GAP SERPL CALCULATED.3IONS-SCNC: 13 MMOL/L (ref 10–20)
ATRIAL RATE: 87 BPM
BASOPHILS # BLD AUTO: 0.1 X10*3/UL (ref 0–0.1)
BASOPHILS NFR BLD AUTO: 1.3 %
BUN SERPL-MCNC: 14 MG/DL (ref 6–23)
CALCIUM SERPL-MCNC: 9.9 MG/DL (ref 8.6–10.3)
CHLORIDE SERPL-SCNC: 103 MMOL/L (ref 98–107)
CO2 SERPL-SCNC: 29 MMOL/L (ref 21–32)
CREAT SERPL-MCNC: 0.67 MG/DL (ref 0.5–1.05)
EGFRCR SERPLBLD CKD-EPI 2021: >90 ML/MIN/1.73M*2
EOSINOPHIL # BLD AUTO: 0.45 X10*3/UL (ref 0–0.7)
EOSINOPHIL NFR BLD AUTO: 5.9 %
ERYTHROCYTE [DISTWIDTH] IN BLOOD BY AUTOMATED COUNT: 13.1 % (ref 11.5–14.5)
FERRITIN SERPL-MCNC: 271 NG/ML (ref 8–150)
GLUCOSE SERPL-MCNC: 120 MG/DL (ref 74–99)
HCT VFR BLD AUTO: 37.5 % (ref 36–46)
HGB BLD-MCNC: 11.8 G/DL (ref 12–16)
IMM GRANULOCYTES # BLD AUTO: 0.02 X10*3/UL (ref 0–0.7)
IMM GRANULOCYTES NFR BLD AUTO: 0.3 % (ref 0–0.9)
IRON SATN MFR SERPL: 17 % (ref 25–45)
IRON SERPL-MCNC: 59 UG/DL (ref 35–150)
LYMPHOCYTES # BLD AUTO: 3.33 X10*3/UL (ref 1.2–4.8)
LYMPHOCYTES NFR BLD AUTO: 43.4 %
MCH RBC QN AUTO: 28.7 PG (ref 26–34)
MCHC RBC AUTO-ENTMCNC: 31.5 G/DL (ref 32–36)
MCV RBC AUTO: 91 FL (ref 80–100)
MONOCYTES # BLD AUTO: 0.73 X10*3/UL (ref 0.1–1)
MONOCYTES NFR BLD AUTO: 9.5 %
NEUTROPHILS # BLD AUTO: 3.04 X10*3/UL (ref 1.2–7.7)
NEUTROPHILS NFR BLD AUTO: 39.6 %
NRBC BLD-RTO: 0 /100 WBCS (ref 0–0)
P AXIS: 74 DEGREES
P OFFSET: 192 MS
P ONSET: 139 MS
PLATELET # BLD AUTO: 350 X10*3/UL (ref 150–450)
POTASSIUM SERPL-SCNC: 3.5 MMOL/L (ref 3.5–5.3)
PR INTERVAL: 160 MS
Q ONSET: 219 MS
QRS COUNT: 14 BEATS
QRS DURATION: 80 MS
QT INTERVAL: 404 MS
QTC CALCULATION(BAZETT): 486 MS
QTC FREDERICIA: 457 MS
R AXIS: 64 DEGREES
RBC # BLD AUTO: 4.11 X10*6/UL (ref 4–5.2)
SODIUM SERPL-SCNC: 141 MMOL/L (ref 136–145)
T AXIS: 46 DEGREES
T OFFSET: 421 MS
TIBC SERPL-MCNC: 357 UG/DL (ref 240–445)
UIBC SERPL-MCNC: 298 UG/DL (ref 110–370)
VENTRICULAR RATE: 87 BPM
WBC # BLD AUTO: 7.7 X10*3/UL (ref 4.4–11.3)

## 2024-11-15 PROCEDURE — 80048 BASIC METABOLIC PNL TOTAL CA: CPT

## 2024-11-15 PROCEDURE — 83550 IRON BINDING TEST: CPT

## 2024-11-15 PROCEDURE — 93005 ELECTROCARDIOGRAM TRACING: CPT

## 2024-11-15 PROCEDURE — 83036 HEMOGLOBIN GLYCOSYLATED A1C: CPT

## 2024-11-15 PROCEDURE — 82728 ASSAY OF FERRITIN: CPT

## 2024-11-15 PROCEDURE — 85025 COMPLETE CBC W/AUTO DIFF WBC: CPT

## 2024-11-15 PROCEDURE — 36415 COLL VENOUS BLD VENIPUNCTURE: CPT

## 2024-11-15 PROCEDURE — 83540 ASSAY OF IRON: CPT

## 2024-11-15 PROCEDURE — 93010 ELECTROCARDIOGRAM REPORT: CPT | Performed by: INTERNAL MEDICINE

## 2024-11-15 RX ORDER — CEPHALEXIN 500 MG/1
500 CAPSULE ORAL 2 TIMES DAILY
COMMUNITY

## 2024-11-15 RX ORDER — ACETAMINOPHEN 500 MG
500 TABLET ORAL EVERY 6 HOURS PRN
COMMUNITY

## 2024-11-15 ASSESSMENT — ENCOUNTER SYMPTOMS
EYES NEGATIVE: 1
BACK PAIN: 1
ALLERGIC/IMMUNOLOGIC NEGATIVE: 1
NUMBNESS: 1
CARDIOVASCULAR NEGATIVE: 1
RESPIRATORY NEGATIVE: 1
GASTROINTESTINAL NEGATIVE: 1
CONSTITUTIONAL NEGATIVE: 1
ENDOCRINE NEGATIVE: 1
HEMATOLOGIC/LYMPHATIC NEGATIVE: 1
PSYCHIATRIC NEGATIVE: 1

## 2024-11-15 ASSESSMENT — DUKE ACTIVITY SCORE INDEX (DASI)
CAN YOU PARTICIPATE IN STRENOUS SPORTS LIKE SWIMMING, SINGLES TENNIS, FOOTBALL, BASKETBALL, OR SKIING: NO
CAN YOU HAVE SEXUAL RELATIONS: YES
TOTAL_SCORE: 20.7
CAN YOU DO YARD WORK LIKE RAKING LEAVES, WEEDING OR PUSHING A MOWER: NO
CAN YOU TAKE CARE OF YOURSELF (EAT, DRESS, BATHE, OR USE TOILET): YES
CAN YOU DO LIGHT WORK AROUND THE HOUSE LIKE DUSTING OR WASHING DISHES: YES
CAN YOU DO HEAVY WORK AROUND THE HOUSE LIKE SCRUBBING FLOORS OR LIFTING AND MOVING HEAVY FURNITURE: NO
CAN YOU DO MODERATE WORK AROUND THE HOUSE LIKE VACUUMING, SWEEPING FLOORS OR CARRYING GROCERIES: NO
CAN YOU CLIMB A FLIGHT OF STAIRS OR WALK UP A HILL: YES
CAN YOU PARTICIPATE IN MODERATE RECREATIONAL ACTIVITIES LIKE GOLF, BOWLING, DANCING, DOUBLES TENNIS OR THROWING A BASEBALL OR FOOTBALL: NO
CAN YOU RUN A SHORT DISTANCE: NO
DASI METS SCORE: 5.3
CAN YOU WALK A BLOCK OR TWO ON LEVEL GROUND: YES
CAN YOU WALK INDOORS, SUCH AS AROUND YOUR HOUSE: YES

## 2024-11-15 ASSESSMENT — PAIN - FUNCTIONAL ASSESSMENT: PAIN_FUNCTIONAL_ASSESSMENT: 0-10

## 2024-11-15 ASSESSMENT — PAIN DESCRIPTION - DESCRIPTORS: DESCRIPTORS: ACHING;SHARP;STABBING

## 2024-11-15 ASSESSMENT — PAIN SCALES - GENERAL: PAINLEVEL_OUTOF10: 9

## 2024-11-15 NOTE — PREPROCEDURE INSTRUCTIONS
Medication List            Accurate as of November 15, 2024  8:03 AM. Always use your most recent med list.                acetaminophen 500 mg tablet  Commonly known as: Tylenol  Medication Adjustments for Surgery: Take/Use as prescribed  Notes to patient: MAY TAKE MORNING OF SURGERY IF NEEDED     albuterol 90 mcg/actuation inhaler  Medication Adjustments for Surgery: Take/Use as prescribed  Notes to patient: BRING TO HOSPITAL     cephalexin 500 mg capsule  Commonly known as: Keflex  Medication Adjustments for Surgery: Take/Use as prescribed     cetirizine 10 mg tablet  Commonly known as: ZyrTEC  TAKE 1 TABLET BY MOUTH EVERY DAY  Medication Adjustments for Surgery: Do Not take on the morning of surgery     chlorthalidone 25 mg tablet  Commonly known as: Hygroton  Take 1 tablet (25 mg) by mouth once daily.  Medication Adjustments for Surgery: Take on the morning of surgery     cholecalciferol 50 MCG (2000 UT) tablet  Commonly known as: Vitamin D3  Take 1 tablet (50 mcg) by mouth once daily.  Additional Medication Adjustments for Surgery: Take last dose 7 days before surgery     clobetasol 0.05 % cream  Commonly known as: Temovate  Medication Adjustments for Surgery: Take/Use as prescribed     docusate sodium 50 mg capsule  Commonly known as: Colace  Medication Adjustments for Surgery: Take/Use as prescribed     hydrocortisone 1 % cream  Medication Adjustments for Surgery: Take/Use as prescribed     ketoconazole 2 % cream  Commonly known as: NIZOral  Medication Adjustments for Surgery: Take/Use as prescribed     losartan 50 mg tablet  Commonly known as: Cozaar  Take 1 tablet (50 mg) by mouth once daily.  Medication Adjustments for Surgery: Do Not take on the morning of surgery     potassium chloride CR 10 mEq ER tablet  Commonly known as: Klor-Con  TAKE 2 TABLETS (20 MEQ) BY MOUTH ONCE DAILY.  Medication Adjustments for Surgery: Do Not take on the morning of surgery                              NPO  Instructions:    Do not eat any food after midnight the night before your surgery/procedure.    Additional Instructions:     Day of Surgery:  Review your medication instructions, take indicated medications  Wear  comfortable loose fitting clothing  Do not use moisturizers, creams, lotions or perfume  All jewelry and valuables should be left at home          Why must I stop eating and drinking near surgery time?  With sedation, food or liquid in your stomach can enter your lungs causing serious complications  Increases nausea and vomiting    When do I need to stop eating and drinking before my surgery?   Do not eat or drink after midnight the night before your surgery/procedure.  You may have small sips of water to take your medication.    PAT DISCHARGE INSTRUCTIONS    Please call the Same Day Surgery (SDS) Department of the hospital where your procedure will be performed after 2:00 PM the day before your surgery. If you are scheduled on a Monday, or a Tuesday following a Monday holiday, you will need to call on the last business day prior to your surgery.    [place] [id number] [phone]  Second Floor      Please let your surgeon know if:      You develop any open sores, shingles, burning or painful urination as these may increase your risk of an infection.   You no longer wish to have the surgery.   Any other personal circumstances change that may lead to the need to cancel or defer this surgery-such as being sick or getting admitted to any hospital within one week of your planned procedure.    Your contact details change, such as a change of address or phone number.    Starting now:     Please DO NOT drink alcohol or smoke OR VAPE for 24 hours before surgery. It is well known that quitting smoking can make a huge difference to your health and recovery from surgery. The longer you abstain from smoking, the better your chances of a healthy recovery.  If you need help with quitting, call 1-533-QUIT-NOW to be connected to a trained counselor who will discuss the best methods to help you quit.     Before your surgery:    Please stop all supplements 7 days prior to surgery. Or as directed by your surgeon.   Please stop taking NSAID pain medicine such as Advil and Motrin 7 days before surgery.    If you develop any fever, cough, cold, rashes, cuts, scratches, scrapes, urinary symptoms or infection anywhere on your body (including teeth and gums) prior to surgery, please call your surgeon’s office as soon as possible. This may require treatment to reduce the chance of cancellation on the day of surgery.    The day before your surgery:   DIET- Please follow the diet instructions at the top of your packet.   Get a good night’s rest.  Use the special soap for bathing if you have been instructed to use one.    Scheduled surgery times may change and you will be notified if this occurs - please check your personal voicemail for any updates.     On the morning of surgery:   Wear comfortable, loose fitting clothes which open in the front. Please do not wear moisturizers, creams, lotions, makeup or perfume.    Please bring with you to surgery:   Photo ID and insurance card   Current list of medicines and allergies   Pacemaker/ Defibrillator/Heart stent cards   CPAP machine and mask    Slings/ splints/ crutches   A copy of your complete advanced directive/DHPOA.    Please do NOT bring with you to surgery:   All jewelry and valuables should be left at home.   Prosthetic devices such as contact lenses, hearing aids, dentures, eyelash extensions, hairpins and body piercings must be removed prior to going in to the surgical suite.    After outpatient surgery:   A responsible adult MUST accompany you at the time of discharge and stay with you for 24 hours after your surgery. You may NOT drive yourself home after surgery.    Do not drive, operate machinery, make critical decisions or  do activities that require co-ordination or balance until after a night’s sleep.   Do not drink alcoholic beverages for 24 hours.   Instructions for resuming your medications will be provided by your surgeon.    CALL YOUR DOCTOR AFTER SURGERY IF YOU HAVE:     Chills and/or a fever of 101° F or higher.    Redness, swelling, pus or drainage from your surgical wound or a bad smell from the wound.    Lightheadedness, fainting or confusion.    Persistent vomiting (throwing up) and are not able to eat or drink for 12 hours.    Three or more loose, watery bowel movements in 24 hours (diarrhea).   Difficulty or pain while urinating( after non-urological surgery)    Pain and swelling in your legs, especially if it is only on one side.    Difficulty breathing or are breathing faster than normal.    Any new concerning symptoms.

## 2024-11-15 NOTE — H&P (VIEW-ONLY)
CPM/PAT Evaluation       Name: Stan Bee)  /Age: 1980/44 y.o.     In-Person       Chief Complaint: Plantar fasciitis    HPI: Stan Bee is 44 year old female in with complaints of right foot pain. She states she has pain in both feet but the right is worse than the left. She states she has numbness, tingling, and burning in the right foot. She states she had this pain for a year and a half. She states there are certain shoes or work boots that she can wear that helps with her foot pain. She tries to do foot exercises at home but she states it does not help. MRI of the right ankle shows:  IMPRESSION:  1. Severe tendinopathy of the distal Achilles tendon with  retrocalcaneal bursitis. No Achilles tendon tear seen.  2. Moderate thickening with increased intrasubstance signal within  the plantar aponeurosis. Underlying plantar fasciitis high in the  differential.  3. Small amount of ankle joint fluid.    She denies fever, chills, nausea, vomiting, chest pain, sob, dizziness, and palpitations. She is scheduled for a fasciotomy of the right plantar.    Past Medical History:   Diagnosis Date    Hypertension     Other specified disorders of cornea, bilateral 10/23/2016    Corneal irritation, bilateral    Personal history of malignant neoplasm of cervix uteri 2014    History of cervical cancer    Sleep apnea     Unspecified acute conjunctivitis, bilateral 10/23/2016    Acute bacterial conjunctivitis of both eyes       Past Surgical History:   Procedure Laterality Date    CERVICAL BIOPSY  W/ LOOP ELECTRODE EXCISION  2014    Cervical Loop Electrosurgical Excision (LEEP)     SECTION, CLASSIC  2013     Section     SECTION, CLASSIC  2013     Section     SECTION, CLASSIC  2017     Section    CHOLECYSTECTOMY  2013    Cholecystectomy    HYSTERECTOMY      OTHER SURGICAL HISTORY  2021    Total hysterectomy with  removal of both tubes and ovaries    OTHER SURGICAL HISTORY  2017    Cervix Cryosurgery    TUBAL LIGATION  2014    Tubal Ligation       Social History     Tobacco Use    Smoking status: Former     Current packs/day: 0.00     Types: Cigarettes     Quit date:      Years since quittin.8    Smokeless tobacco: Never   Substance Use Topics    Alcohol use: Yes     Comment: occasionally LESS THAN 1 X MONTH     Social History     Substance and Sexual Activity   Drug Use Never         Family History   Problem Relation Name Age of Onset    Breast cancer Mother      Other (mva) Father      Allergies Other      Asthma Other      Eczema Other         Allergies   Allergen Reactions    Bee Venom Protein (Honey Bee) Anaphylaxis    Latex Anaphylaxis, Hives and Other    Cyclobenzaprine Unknown     hallucinations    House Dust Unknown     Pollen and dust allergy      Causes watery eyes    Lisinopril Other     Current Outpatient Medications   Medication Sig Dispense Refill    acetaminophen (Tylenol) 500 mg tablet Take 1 tablet (500 mg) by mouth every 6 hours if needed for mild pain (1 - 3) or moderate pain (4 - 6).      albuterol 90 mcg/actuation inhaler Inhale 2 puffs every 6 hours if needed.      cephalexin (Keflex) 500 mg capsule Take 1 capsule (500 mg) by mouth 2 times a day.      cetirizine (ZyrTEC) 10 mg tablet TAKE 1 TABLET BY MOUTH EVERY DAY (Patient taking differently: Take 1 tablet (10 mg) by mouth once daily as needed for allergies or rhinitis.) 30 tablet 1    chlorthalidone (Hygroton) 25 mg tablet Take 1 tablet (25 mg) by mouth once daily. 90 tablet 0    cholecalciferol (Vitamin D3) 50 MCG (2000 UT) tablet Take 1 tablet (50 mcg) by mouth once daily. 30 tablet 11    clobetasol (Temovate) 0.05 % cream Apply 1 Application topically twice a day.      docusate sodium (Colace) 50 mg capsule Take 2 capsules (100 mg) by mouth once daily.      hydrocortisone 1 % cream APPLY SPARINGLY AND RUB IN WELL TO AFFECTED  AREA(S) AS DIRECTED.      ketoconazole (NIZOral) 2 % cream 1 Application      losartan (Cozaar) 50 mg tablet Take 1 tablet (50 mg) by mouth once daily. 90 tablet 2    potassium chloride CR 10 mEq ER tablet TAKE 2 TABLETS (20 MEQ) BY MOUTH ONCE DAILY. 180 tablet 1     No current facility-administered medications for this visit.       Review of Systems   Constitutional: Negative.    HENT: Negative.     Eyes: Negative.    Respiratory: Negative.     Cardiovascular: Negative.    Gastrointestinal: Negative.    Endocrine: Negative.    Genitourinary: Negative.    Musculoskeletal:  Positive for back pain and gait problem.   Skin: Negative.    Allergic/Immunologic: Negative.    Neurological:  Positive for numbness (tingling, burning bilateral feet).   Hematological: Negative.    Psychiatric/Behavioral: Negative.                Physical Exam  Vitals reviewed.   Constitutional:       Appearance: Normal appearance. She is obese.   HENT:      Head: Normocephalic and atraumatic.      Nose: Nose normal.      Mouth/Throat:      Mouth: Mucous membranes are moist.      Pharynx: Oropharynx is clear.   Eyes:      Extraocular Movements: Extraocular movements intact.      Conjunctiva/sclera: Conjunctivae normal.   Cardiovascular:      Rate and Rhythm: Normal rate and regular rhythm.      Pulses: Normal pulses.      Heart sounds: Normal heart sounds.   Pulmonary:      Effort: Pulmonary effort is normal.      Breath sounds: Normal breath sounds.   Abdominal:      General: Bowel sounds are normal.      Palpations: Abdomen is soft.   Genitourinary:     Comments: Assessment deferred to physician    Musculoskeletal:         General: Normal range of motion.      Cervical back: Normal range of motion and neck supple.      Right lower leg: Edema present.      Left lower leg: Edema present.   Skin:     General: Skin is warm and dry.   Neurological:      General: No focal deficit present.      Mental Status: She is alert and oriented to person,  "place, and time.   Psychiatric:         Mood and Affect: Mood normal.         Behavior: Behavior normal.         Thought Content: Thought content normal.         Judgment: Judgment normal.          PAT AIRWAY:   Airway:     Mallampati::  III    TM distance::  <3 FB    Neck ROM::  Full          /85   Pulse 95   Temp 37 °C (98.6 °F) (Temporal)   Ht 1.626 m (5' 4\")   Wt 148 kg (327 lb 4.8 oz)   SpO2 98%   BMI 56.18 kg/m²       ASA: III  ORALIA:2.8%  RCRI: 0.4%      DASI Risk Score      Flowsheet Row Pre-Admission Testing from 11/15/2024 in [facility]   Can you take care of yourself (eat, dress, bathe, or use toilet)?  2.75 filed at 11/15/2024 0758   Can you walk indoors, such as around your house? 1.75 filed at 11/15/2024 0758   Can you walk a block or two on level ground?  2.75 filed at 11/15/2024 0758   Can you climb a flight of stairs or walk up a hill? 5.5 filed at 11/15/2024 0758   Can you run a short distance? 0 filed at 11/15/2024 0758   Can you do light work around the house like dusting or washing dishes? 2.7 filed at 11/15/2024 0758   Can you do moderate work around the house like vacuuming, sweeping floors or carrying groceries? 0 filed at 11/15/2024 0758   Can you do heavy work around the house like scrubbing floors or lifting and moving heavy furniture?  0 filed at 11/15/2024 0758   Can you do yard work like raking leaves, weeding or pushing a mower? 0 filed at 11/15/2024 0758   Can you have sexual relations? 5.25 filed at 11/15/2024 0758   Can you participate in moderate recreational activities like golf, bowling, dancing, doubles tennis or throwing a baseball or football? 0 filed at 11/15/2024 0758   Can you participate in strenous sports like swimming, singles tennis, football, basketball, or skiing? 0 filed at 11/15/2024 0758   DASI SCORE 20.7 filed at 11/15/2024 0758   METS Score (Will be calculated only when all the questions are answered) 5.3 filed at 11/15/2024 0758      "     Caprini DVT Assessment    No data to display       Modified Frailty Index    No data to display       CHADS2 Stroke Risk  Current as of 51 minutes ago        N/A 3 to 100%: High Risk   2 to < 3%: Medium Risk   0 to < 2%: Low Risk     Last Change: N/A          This score determines the patient's risk of having a stroke if the patient has atrial fibrillation.        This score is not applicable to this patient. Components are not calculated.          Revised Cardiac Risk Index      Flowsheet Row Pre-Admission Testing from 11/15/2024 in [facility]   High-Risk Surgery (Intraperitoneal, Intrathoracic,Suprainguinal vascular) 0 filed at 11/15/2024 0846   History of ischemic heart disease (History of MI, History of positive exercuse test, Current chest paint considered due to myocardial ischemia, Use of nitrate therapy, ECG with pathological Q Waves) 0 filed at 11/15/2024 0846   History of congestive heart failure (pulmonary edemia, bilateral rales or S3 gallop, Paroxysmal nocturnal dyspnea, CXR showing pulmonary vascular redistribution) 0 filed at 11/15/2024 0846   History of cerebrovascular disease (Prior TIA or stroke) 0 filed at 11/15/2024 0846   Pre-operative insulin treatment 0 filed at 11/15/2024 0846   Pre-operative creatinine>2 mg/dl 0 filed at 11/15/2024 0846   Revised Cardiac Risk Calculator 0 filed at 11/15/2024 0846          Apfel Simplified Score    No data to display       Risk Analysis Index Results This Encounter    No data found in the last 10 encounters.       Stop Bang Score      Flowsheet Row Pre-Admission Testing from 11/15/2024 in [facility]   Do you snore loudly? 1 filed at 11/15/2024 0757   Do you often feel tired or fatigued after your sleep? 0 filed at 11/15/2024 0757   Has anyone ever observed you stop breathing in your sleep? 1 filed at 11/15/2024 0757   Do you have or are you being treated for high blood pressure? 1 filed at 11/15/2024 0757   Recent BMI  (Calculated) 56.2 filed at 11/15/2024 0757   Is BMI greater than 35 kg/m2? 1=Yes filed at 11/15/2024 0757   Age older than 50 years old? 0=No filed at 11/15/2024 0757   Is your neck circumference greater than 17 inches (Male) or 16 inches (Female)? 1 filed at 11/15/2024 0757   Gender - Male 0=No filed at 11/15/2024 0757   STOP-BANG Total Score 5 filed at 11/15/2024 0757          Prodigy: High Risk  Total Score: 0          ARISCAT Score for Postoperative Pulmonary Complications    No data to display       Miller Perioperative Risk for Myocardial Infarction or Cardiac Arrest ([facility])    No data to display         Assessment and Plan:     1.Plantar fasciitis : Fasciotomy Plantar Endoscopy   2. Hypertension  3. Sleep Apnea: wears C-PAP  4. Back Pain  5. Asthma: last used albuterol inhaler a couple of months ago.   6. BMI: 56.18    EKG performed in [facility]  LABS: CBC, BMP collected in [facility]  KAIN Zarco    Addendum 11/15/24 14:14  Patient had two EKGs today in [facility]. The first EKG read NSR with Septal Infarct. The second read NSR with prolonged QT. The correct EKG is scanned into media. We could not cancel the EKG that is under the cardiology tab.  KAIN Zarco

## 2024-11-15 NOTE — CPM/PAT H&P
CPM/PAT Evaluation       Name: Stan Bee)  /Age: 1980/44 y.o.     In-Person       Chief Complaint: Plantar fasciitis    HPI: Stan Bee is 44 year old female in with complaints of right foot pain. She states she has pain in both feet but the right is worse than the left. She states she has numbness, tingling, and burning in the right foot. She states she had this pain for a year and a half. She states there are certain shoes or work boots that she can wear that helps with her foot pain. She tries to do foot exercises at home but she states it does not help. MRI of the right ankle shows:  IMPRESSION:  1. Severe tendinopathy of the distal Achilles tendon with  retrocalcaneal bursitis. No Achilles tendon tear seen.  2. Moderate thickening with increased intrasubstance signal within  the plantar aponeurosis. Underlying plantar fasciitis high in the  differential.  3. Small amount of ankle joint fluid.    She denies fever, chills, nausea, vomiting, chest pain, sob, dizziness, and palpitations. She is scheduled for a fasciotomy of the right plantar.    Past Medical History:   Diagnosis Date    Hypertension     Other specified disorders of cornea, bilateral 10/23/2016    Corneal irritation, bilateral    Personal history of malignant neoplasm of cervix uteri 2014    History of cervical cancer    Sleep apnea     Unspecified acute conjunctivitis, bilateral 10/23/2016    Acute bacterial conjunctivitis of both eyes       Past Surgical History:   Procedure Laterality Date    CERVICAL BIOPSY  W/ LOOP ELECTRODE EXCISION  2014    Cervical Loop Electrosurgical Excision (LEEP)     SECTION, CLASSIC  2013     Section     SECTION, CLASSIC  2013     Section     SECTION, CLASSIC  2017     Section    CHOLECYSTECTOMY  2013    Cholecystectomy    HYSTERECTOMY      OTHER SURGICAL HISTORY  2021    Total hysterectomy with  removal of both tubes and ovaries    OTHER SURGICAL HISTORY  2017    Cervix Cryosurgery    TUBAL LIGATION  2014    Tubal Ligation       Social History     Tobacco Use    Smoking status: Former     Current packs/day: 0.00     Types: Cigarettes     Quit date:      Years since quittin.8    Smokeless tobacco: Never   Substance Use Topics    Alcohol use: Yes     Comment: occasionally LESS THAN 1 X MONTH     Social History     Substance and Sexual Activity   Drug Use Never         Family History   Problem Relation Name Age of Onset    Breast cancer Mother      Other (mva) Father      Allergies Other      Asthma Other      Eczema Other         Allergies   Allergen Reactions    Bee Venom Protein (Honey Bee) Anaphylaxis    Latex Anaphylaxis, Hives and Other    Cyclobenzaprine Unknown     hallucinations    House Dust Unknown     Pollen and dust allergy      Causes watery eyes    Lisinopril Other     Current Outpatient Medications   Medication Sig Dispense Refill    acetaminophen (Tylenol) 500 mg tablet Take 1 tablet (500 mg) by mouth every 6 hours if needed for mild pain (1 - 3) or moderate pain (4 - 6).      albuterol 90 mcg/actuation inhaler Inhale 2 puffs every 6 hours if needed.      cephalexin (Keflex) 500 mg capsule Take 1 capsule (500 mg) by mouth 2 times a day.      cetirizine (ZyrTEC) 10 mg tablet TAKE 1 TABLET BY MOUTH EVERY DAY (Patient taking differently: Take 1 tablet (10 mg) by mouth once daily as needed for allergies or rhinitis.) 30 tablet 1    chlorthalidone (Hygroton) 25 mg tablet Take 1 tablet (25 mg) by mouth once daily. 90 tablet 0    cholecalciferol (Vitamin D3) 50 MCG (2000 UT) tablet Take 1 tablet (50 mcg) by mouth once daily. 30 tablet 11    clobetasol (Temovate) 0.05 % cream Apply 1 Application topically twice a day.      docusate sodium (Colace) 50 mg capsule Take 2 capsules (100 mg) by mouth once daily.      hydrocortisone 1 % cream APPLY SPARINGLY AND RUB IN WELL TO AFFECTED  AREA(S) AS DIRECTED.      ketoconazole (NIZOral) 2 % cream 1 Application      losartan (Cozaar) 50 mg tablet Take 1 tablet (50 mg) by mouth once daily. 90 tablet 2    potassium chloride CR 10 mEq ER tablet TAKE 2 TABLETS (20 MEQ) BY MOUTH ONCE DAILY. 180 tablet 1     No current facility-administered medications for this visit.       Review of Systems   Constitutional: Negative.    HENT: Negative.     Eyes: Negative.    Respiratory: Negative.     Cardiovascular: Negative.    Gastrointestinal: Negative.    Endocrine: Negative.    Genitourinary: Negative.    Musculoskeletal:  Positive for back pain and gait problem.   Skin: Negative.    Allergic/Immunologic: Negative.    Neurological:  Positive for numbness (tingling, burning bilateral feet).   Hematological: Negative.    Psychiatric/Behavioral: Negative.                Physical Exam  Vitals reviewed.   Constitutional:       Appearance: Normal appearance. She is obese.   HENT:      Head: Normocephalic and atraumatic.      Nose: Nose normal.      Mouth/Throat:      Mouth: Mucous membranes are moist.      Pharynx: Oropharynx is clear.   Eyes:      Extraocular Movements: Extraocular movements intact.      Conjunctiva/sclera: Conjunctivae normal.   Cardiovascular:      Rate and Rhythm: Normal rate and regular rhythm.      Pulses: Normal pulses.      Heart sounds: Normal heart sounds.   Pulmonary:      Effort: Pulmonary effort is normal.      Breath sounds: Normal breath sounds.   Abdominal:      General: Bowel sounds are normal.      Palpations: Abdomen is soft.   Genitourinary:     Comments: Assessment deferred to physician    Musculoskeletal:         General: Normal range of motion.      Cervical back: Normal range of motion and neck supple.      Right lower leg: Edema present.      Left lower leg: Edema present.   Skin:     General: Skin is warm and dry.   Neurological:      General: No focal deficit present.      Mental Status: She is alert and oriented to person,  "place, and time.   Psychiatric:         Mood and Affect: Mood normal.         Behavior: Behavior normal.         Thought Content: Thought content normal.         Judgment: Judgment normal.          PAT AIRWAY:   Airway:     Mallampati::  III    TM distance::  <3 FB    Neck ROM::  Full          /85   Pulse 95   Temp 37 °C (98.6 °F) (Temporal)   Ht 1.626 m (5' 4\")   Wt 148 kg (327 lb 4.8 oz)   SpO2 98%   BMI 56.18 kg/m²       ASA: III  ORALIA:2.8%  RCRI: 0.4%      DASI Risk Score      Flowsheet Row Pre-Admission Testing from 11/15/2024 in [facility]   Can you take care of yourself (eat, dress, bathe, or use toilet)?  2.75 filed at 11/15/2024 0758   Can you walk indoors, such as around your house? 1.75 filed at 11/15/2024 0758   Can you walk a block or two on level ground?  2.75 filed at 11/15/2024 0758   Can you climb a flight of stairs or walk up a hill? 5.5 filed at 11/15/2024 0758   Can you run a short distance? 0 filed at 11/15/2024 0758   Can you do light work around the house like dusting or washing dishes? 2.7 filed at 11/15/2024 0758   Can you do moderate work around the house like vacuuming, sweeping floors or carrying groceries? 0 filed at 11/15/2024 0758   Can you do heavy work around the house like scrubbing floors or lifting and moving heavy furniture?  0 filed at 11/15/2024 0758   Can you do yard work like raking leaves, weeding or pushing a mower? 0 filed at 11/15/2024 0758   Can you have sexual relations? 5.25 filed at 11/15/2024 0758   Can you participate in moderate recreational activities like golf, bowling, dancing, doubles tennis or throwing a baseball or football? 0 filed at 11/15/2024 0758   Can you participate in strenous sports like swimming, singles tennis, football, basketball, or skiing? 0 filed at 11/15/2024 0758   DASI SCORE 20.7 filed at 11/15/2024 0758   METS Score (Will be calculated only when all the questions are answered) 5.3 filed at 11/15/2024 0758      "     Caprini DVT Assessment    No data to display       Modified Frailty Index    No data to display       CHADS2 Stroke Risk  Current as of 51 minutes ago        N/A 3 to 100%: High Risk   2 to < 3%: Medium Risk   0 to < 2%: Low Risk     Last Change: N/A          This score determines the patient's risk of having a stroke if the patient has atrial fibrillation.        This score is not applicable to this patient. Components are not calculated.          Revised Cardiac Risk Index      Flowsheet Row Pre-Admission Testing from 11/15/2024 in [facility]   High-Risk Surgery (Intraperitoneal, Intrathoracic,Suprainguinal vascular) 0 filed at 11/15/2024 0846   History of ischemic heart disease (History of MI, History of positive exercuse test, Current chest paint considered due to myocardial ischemia, Use of nitrate therapy, ECG with pathological Q Waves) 0 filed at 11/15/2024 0846   History of congestive heart failure (pulmonary edemia, bilateral rales or S3 gallop, Paroxysmal nocturnal dyspnea, CXR showing pulmonary vascular redistribution) 0 filed at 11/15/2024 0846   History of cerebrovascular disease (Prior TIA or stroke) 0 filed at 11/15/2024 0846   Pre-operative insulin treatment 0 filed at 11/15/2024 0846   Pre-operative creatinine>2 mg/dl 0 filed at 11/15/2024 0846   Revised Cardiac Risk Calculator 0 filed at 11/15/2024 0846          Apfel Simplified Score    No data to display       Risk Analysis Index Results This Encounter    No data found in the last 10 encounters.       Stop Bang Score      Flowsheet Row Pre-Admission Testing from 11/15/2024 in [facility]   Do you snore loudly? 1 filed at 11/15/2024 0757   Do you often feel tired or fatigued after your sleep? 0 filed at 11/15/2024 0757   Has anyone ever observed you stop breathing in your sleep? 1 filed at 11/15/2024 0757   Do you have or are you being treated for high blood pressure? 1 filed at 11/15/2024 0757   Recent BMI  (Calculated) 56.2 filed at 11/15/2024 0757   Is BMI greater than 35 kg/m2? 1=Yes filed at 11/15/2024 0757   Age older than 50 years old? 0=No filed at 11/15/2024 0757   Is your neck circumference greater than 17 inches (Male) or 16 inches (Female)? 1 filed at 11/15/2024 0757   Gender - Male 0=No filed at 11/15/2024 0757   STOP-BANG Total Score 5 filed at 11/15/2024 0757          Prodigy: High Risk  Total Score: 0          ARISCAT Score for Postoperative Pulmonary Complications    No data to display       Miller Perioperative Risk for Myocardial Infarction or Cardiac Arrest ([place])    No data to display         Assessment and Plan:     1.Plantar fasciitis : Fasciotomy Plantar Endoscopy   2. Hypertension  3. Sleep Apnea: wears C-PAP  4. Back Pain  5. Asthma: last used albuterol inhaler a couple of months ago.   6. BMI: 56.18    EKG performed in [facility]  LABS: CBC, BMP collected in [facility]  KAIN Zarco    Addendum 11/15/24 14:14  Patient had two EKGs today in [facility]. The first EKG read NSR with Septal Infarct. The second read NSR with prolonged QT. The correct EKG is scanned into media. We could not cancel the EKG that is under the cardiology tab.  KAIN Zarco

## 2024-11-16 LAB
EST. AVERAGE GLUCOSE BLD GHB EST-MCNC: 105 MG/DL
HBA1C MFR BLD: 5.3 %

## 2024-11-19 ENCOUNTER — HOSPITAL ENCOUNTER (OUTPATIENT)
Facility: HOSPITAL | Age: 44
Setting detail: OUTPATIENT SURGERY
Discharge: HOME | End: 2024-11-19
Attending: PODIATRIST | Admitting: PODIATRIST
Payer: COMMERCIAL

## 2024-11-19 ENCOUNTER — PHARMACY VISIT (OUTPATIENT)
Dept: PHARMACY | Facility: CLINIC | Age: 44
End: 2024-11-19

## 2024-11-19 ENCOUNTER — ANESTHESIA EVENT (OUTPATIENT)
Dept: OPERATING ROOM | Facility: HOSPITAL | Age: 44
End: 2024-11-19
Payer: COMMERCIAL

## 2024-11-19 ENCOUNTER — ANESTHESIA (OUTPATIENT)
Dept: OPERATING ROOM | Facility: HOSPITAL | Age: 44
End: 2024-11-19
Payer: COMMERCIAL

## 2024-11-19 VITALS
HEART RATE: 76 BPM | RESPIRATION RATE: 14 BRPM | BODY MASS INDEX: 56.01 KG/M2 | SYSTOLIC BLOOD PRESSURE: 121 MMHG | DIASTOLIC BLOOD PRESSURE: 51 MMHG | OXYGEN SATURATION: 96 % | WEIGHT: 293 LBS | TEMPERATURE: 97 F

## 2024-11-19 DIAGNOSIS — G89.18 POST-OP PAIN: ICD-10-CM

## 2024-11-19 DIAGNOSIS — M25.571 ACUTE RIGHT ANKLE PAIN: Primary | ICD-10-CM

## 2024-11-19 DIAGNOSIS — M72.2 PLANTAR FASCIITIS, BILATERAL: ICD-10-CM

## 2024-11-19 PROCEDURE — 7100000001 HC RECOVERY ROOM TIME - INITIAL BASE CHARGE: Performed by: PODIATRIST

## 2024-11-19 PROCEDURE — 96372 THER/PROPH/DIAG INJ SC/IM: CPT | Performed by: PODIATRIST

## 2024-11-19 PROCEDURE — 3600000002 HC OR TIME - INITIAL BASE CHARGE - PROCEDURE LEVEL TWO: Performed by: PODIATRIST

## 2024-11-19 PROCEDURE — A29893 PR ANKLE SCOPE,PLANTAR FASCIOTOMY: Performed by: ANESTHESIOLOGY

## 2024-11-19 PROCEDURE — 7100000002 HC RECOVERY ROOM TIME - EACH INCREMENTAL 1 MINUTE: Performed by: PODIATRIST

## 2024-11-19 PROCEDURE — 7100000009 HC PHASE TWO TIME - INITIAL BASE CHARGE: Performed by: PODIATRIST

## 2024-11-19 PROCEDURE — 3600000007 HC OR TIME - EACH INCREMENTAL 1 MINUTE - PROCEDURE LEVEL TWO: Performed by: PODIATRIST

## 2024-11-19 PROCEDURE — 3700000001 HC GENERAL ANESTHESIA TIME - INITIAL BASE CHARGE: Performed by: PODIATRIST

## 2024-11-19 PROCEDURE — 7100000010 HC PHASE TWO TIME - EACH INCREMENTAL 1 MINUTE: Performed by: PODIATRIST

## 2024-11-19 PROCEDURE — RXMED WILLOW AMBULATORY MEDICATION CHARGE

## 2024-11-19 PROCEDURE — 3700000002 HC GENERAL ANESTHESIA TIME - EACH INCREMENTAL 1 MINUTE: Performed by: PODIATRIST

## 2024-11-19 PROCEDURE — 2500000004 HC RX 250 GENERAL PHARMACY W/ HCPCS (ALT 636 FOR OP/ED): Performed by: ANESTHESIOLOGY

## 2024-11-19 PROCEDURE — 2720000007 HC OR 272 NO HCPCS: Performed by: PODIATRIST

## 2024-11-19 PROCEDURE — 2500000004 HC RX 250 GENERAL PHARMACY W/ HCPCS (ALT 636 FOR OP/ED): Performed by: PODIATRIST

## 2024-11-19 RX ORDER — TRAMADOL HYDROCHLORIDE 50 MG/1
50 TABLET ORAL EVERY 6 HOURS PRN
Qty: 20 TABLET | Refills: 0 | Status: SHIPPED | OUTPATIENT
Start: 2024-11-19 | End: 2024-11-24

## 2024-11-19 RX ORDER — KETOROLAC TROMETHAMINE 30 MG/ML
INJECTION, SOLUTION INTRAMUSCULAR; INTRAVENOUS AS NEEDED
Status: DISCONTINUED | OUTPATIENT
Start: 2024-11-19 | End: 2024-11-19

## 2024-11-19 RX ORDER — FENTANYL CITRATE 50 UG/ML
INJECTION, SOLUTION INTRAMUSCULAR; INTRAVENOUS AS NEEDED
Status: DISCONTINUED | OUTPATIENT
Start: 2024-11-19 | End: 2024-11-19

## 2024-11-19 RX ORDER — LIDOCAINE HYDROCHLORIDE 10 MG/ML
0.1 INJECTION, SOLUTION EPIDURAL; INFILTRATION; INTRACAUDAL; PERINEURAL ONCE
Status: DISCONTINUED | OUTPATIENT
Start: 2024-11-19 | End: 2024-11-19 | Stop reason: HOSPADM

## 2024-11-19 RX ORDER — MIDAZOLAM HYDROCHLORIDE 1 MG/ML
1 INJECTION, SOLUTION INTRAMUSCULAR; INTRAVENOUS ONCE AS NEEDED
Status: DISCONTINUED | OUTPATIENT
Start: 2024-11-19 | End: 2024-11-19 | Stop reason: HOSPADM

## 2024-11-19 RX ORDER — BUPIVACAINE HYDROCHLORIDE 5 MG/ML
INJECTION, SOLUTION PERINEURAL AS NEEDED
Status: DISCONTINUED | OUTPATIENT
Start: 2024-11-19 | End: 2024-11-19 | Stop reason: HOSPADM

## 2024-11-19 RX ORDER — PROPOFOL 10 MG/ML
INJECTION, EMULSION INTRAVENOUS AS NEEDED
Status: DISCONTINUED | OUTPATIENT
Start: 2024-11-19 | End: 2024-11-19

## 2024-11-19 RX ORDER — ALBUTEROL SULFATE 0.83 MG/ML
2.5 SOLUTION RESPIRATORY (INHALATION) ONCE
Status: DISCONTINUED | OUTPATIENT
Start: 2024-11-19 | End: 2024-11-19 | Stop reason: HOSPADM

## 2024-11-19 RX ORDER — ONDANSETRON HYDROCHLORIDE 2 MG/ML
4 INJECTION, SOLUTION INTRAVENOUS ONCE AS NEEDED
Status: DISCONTINUED | OUTPATIENT
Start: 2024-11-19 | End: 2024-11-19 | Stop reason: HOSPADM

## 2024-11-19 RX ORDER — LIDOCAINE HYDROCHLORIDE AND EPINEPHRINE 10; 10 UG/ML; MG/ML
INJECTION, SOLUTION INFILTRATION; PERINEURAL AS NEEDED
Status: DISCONTINUED | OUTPATIENT
Start: 2024-11-19 | End: 2024-11-19 | Stop reason: HOSPADM

## 2024-11-19 RX ORDER — SODIUM CHLORIDE, SODIUM LACTATE, POTASSIUM CHLORIDE, CALCIUM CHLORIDE 600; 310; 30; 20 MG/100ML; MG/100ML; MG/100ML; MG/100ML
100 INJECTION, SOLUTION INTRAVENOUS CONTINUOUS
Status: DISCONTINUED | OUTPATIENT
Start: 2024-11-19 | End: 2024-11-19 | Stop reason: HOSPADM

## 2024-11-19 RX ORDER — FENTANYL CITRATE 50 UG/ML
50 INJECTION, SOLUTION INTRAMUSCULAR; INTRAVENOUS EVERY 5 MIN PRN
Status: DISCONTINUED | OUTPATIENT
Start: 2024-11-19 | End: 2024-11-19 | Stop reason: HOSPADM

## 2024-11-19 RX ORDER — SODIUM CHLORIDE, SODIUM LACTATE, POTASSIUM CHLORIDE, CALCIUM CHLORIDE 600; 310; 30; 20 MG/100ML; MG/100ML; MG/100ML; MG/100ML
INJECTION, SOLUTION INTRAVENOUS CONTINUOUS PRN
Status: DISCONTINUED | OUTPATIENT
Start: 2024-11-19 | End: 2024-11-19

## 2024-11-19 RX ORDER — ONDANSETRON HYDROCHLORIDE 2 MG/ML
INJECTION, SOLUTION INTRAVENOUS AS NEEDED
Status: DISCONTINUED | OUTPATIENT
Start: 2024-11-19 | End: 2024-11-19

## 2024-11-19 ASSESSMENT — PAIN - FUNCTIONAL ASSESSMENT
PAIN_FUNCTIONAL_ASSESSMENT: 0-10
PAIN_FUNCTIONAL_ASSESSMENT: FLACC (FACE, LEGS, ACTIVITY, CRY, CONSOLABILITY)
PAIN_FUNCTIONAL_ASSESSMENT: 0-10

## 2024-11-19 ASSESSMENT — PAIN SCALES - GENERAL
PAINLEVEL_OUTOF10: 0 - NO PAIN
PAINLEVEL_OUTOF10: 7
PAINLEVEL_OUTOF10: 0 - NO PAIN

## 2024-11-19 ASSESSMENT — PAIN DESCRIPTION - DESCRIPTORS
DESCRIPTORS: THROBBING

## 2024-11-19 NOTE — OP NOTE
Fasciotomy Plantar Endoscopy (R) Operative Note     Date: 2024  OR Location: [facility] OR    Name: Stan Bee, : 1980, Age: 44 y.o., MRN: [id number], Sex: female    Diagnosis  Pre-op Diagnosis      * Plantar fasciitis [M72.2] Post-op Diagnosis     * Plantar fasciitis [M72.2]     Procedures  Fasciotomy Plantar Endoscopy  10460 - [place] ENDOSCOPIC PLANTAR FASCIOTOMY- right  Left plantar fascia injection      Surgeons      * Ayde Monterroso - Primary    Resident/Fellow/Other Assistant:  Surgeons and Role:     * Bella Britt - Assisting    Staff:   Circulator: Greg Pickett Person: Jessi    Anesthesia Staff: Anesthesiologist: Apollo Montenegro,     Procedure Summary  Anesthesia: General  ASA: III  Estimated Blood Loss: 5mL  Intra-op Medications:   Administrations occurring from 0730 to 0900 on 24:   Medication Name Total Dose   BUPivacaine HCl (Marcaine) 0.5 % (5 mg/mL) injection 5 mL   lidocaine-epinephrine (Xylocaine W/EPI) 1 %-1:100,000 injection 5 mL   dexAMETHasone (Decadron) injection 4 mg   fentaNYL PF 0.05 mg/mL 100 mcg   ketorolac (Toradol) 30 mg 30 mg   LR infusion 79.9 mL   ondansetron 2 mg/mL 4 mg   propofol (Diprivan) injection 10 mg/mL 200 mg              Anesthesia Record               Intraprocedure I/O Totals          Intake    LR infusion 500.00 mL    Total Intake 500 mL          Specimen: No specimens collected              Drains and/or Catheters: * None in log *    Tourniquet Times:   * Missing tourniquet times found for documented tourniquets in lo *     Implants:     Findings: see op report    Indications: Stan Bee is an 44 y.o. female who is having surgery for Right plantar fasciitis..  Patient also has left foot plantar fasciitis and she has had ongoing right foot plantar fasciitis that is worsened over the past year.  She has failed conservative treatment options including injections, therapy, anti-inflammatories, shoewear modifications, inserts, activity  limitations and protected weightbearing.  An MRI was obtained showing a thickened plantar fascia and chronic plantar fasciitis we discussed surgical intervention and patient was in agreement.    The patient was seen in the preoperative area. The risks, benefits, complications, treatment options, non-operative alternatives, expected recovery and outcomes were discussed with the patient. The possibilities of reaction to medication, pulmonary aspiration, injury to surrounding structures, bleeding, recurrent infection, the need for additional procedures, failure to diagnose a condition, and creating a complication requiring transfusion or operation were discussed with the patient. The patient concurred with the proposed plan, giving informed consent.  The site of surgery was properly noted/marked if necessary per policy. The patient has been actively warmed in preoperative area. Preoperative antibiotics are not indicated. Venous thrombosis prophylaxis have been ordered including unilateral sequential compression device    Procedure Details: After the patient was identified and the surgical site was marked, the patient was brought to the operating room placed on the operating table in a supine position.  A member of the anesthesia team administered an LMA anesthesia.  I then administered a preoperative local block on the right foot consisting of a one-to-one mixture of 1% lidocaine with epinephrine and half percent Marcaine plain.  After this a steroid injection including 1 cc of half percent Marcaine plain and 1 cc of dexamethasone was injected into the left heel in the standard plantar fascia junction fashion.  After this the right lower extremities and scrubbed prepped and draped in the usual sterile fashion and a timeout was then performed proper identifying the patient and the surgical site.  An Esmarch was then utilized to examine at the left lower extremity and utilized as a tourniquet.  A stab incision was made  on the medial aspect of the heel utilizing 11 blade.  Blunt dissection was then carried down to the level of the plantar fascia.  The appropriate obturators were then used to create an adequate portal for the centerline equipment.  A rasp was then utilized to remove all fat from the plantar fascia the centerline appointment was then inserted and the plantar fascia was then visualized once the plantar fascia was then visualized, the foot was dorsiflexed, and then the blade was then engaged and a fasciotomy was then performed cutting through the central and medial bands of the plantar fascia.  The lateral band of the plantar fascia was left intact.  The underlying muscles were visualized with the camera scope after the fasciotomy was complete.  After this the area was then copiously irrigated with normal saline and a closure consisting of 3-0 nylon was then performed in a simple interrupted fashion.  The Esmarch was then let down.  A dressing of Adaptic, gauze, Kerlix and Ace wrap was then applied.  The patient was then awoken and transferred the postanesthesia care unit with all vital signs stable.  Patient tolerated the procedure well.  All sponge needle instrument counts were deemed accurate at the end of the case.  Complications:  None; patient tolerated the procedure well.    Disposition: PACU - hemodynamically stable.  Condition: stable                 Additional Details: None    Attending Attestation:     Ayde Monterroso  Phone Number: [phone]

## 2024-11-19 NOTE — ANESTHESIA PROCEDURE NOTES
Airway  Date/Time: 11/19/2024 8:04 AM  Urgency: elective    Airway not difficult    Staffing  Performed: attending   Authorized by: Apollo Montenegro DO    Performed by: Apollo Montenegro DO  Patient location during procedure: OR    Indications and Patient Condition  Indications for airway management: anesthesia  Spontaneous ventilation: present  Sedation level: deep  Preoxygenated: yes  Patient position: sniffing  Mask difficulty assessment: 1 - vent by mask  Planned trial extubation    Final Airway Details  Final airway type: supraglottic airway      Successful airway: classic  Size 4     Number of attempts at approach: 1

## 2024-11-19 NOTE — ANESTHESIA PREPROCEDURE EVALUATION
Patient: Stan Bee    Procedure Information       Date/Time: 11/19/24 7295    Procedure: Fasciotomy Plantar Endoscopy (Right: Foot) - [facility]: CENTERLINE    Location: [facility] OR 05 / [facility]    Surgeons: Ayde Monterroso DPM            Relevant Problems   Cardiac   (+) Atypical chest pain   (+) Hypertension   (+) Other hyperlipidemia      Pulmonary   (+) Asthma   (+) EDISON (obstructive sleep apnea)      Neuro   (+) Radiculopathy      /Renal   (+) Yeast UTI      Endocrine   (+) Morbid obesity (Multi)      Hematology   (+) Cutaneous T-cell lymphoma (Multi)   (+) Iron deficiency anemia      HEENT   (+) Sinus pressure      ID   (+) Influenza   (+) Onychomycosis   (+) Paronychia of great toe   (+) Vaginal candidiasis   (+) Yeast UTI      Skin   (+) Rash and other nonspecific skin eruption       Clinical information reviewed:   Tobacco  Allergies  Meds  Problems  Med Hx  Surg Hx  OB Status    Fam Hx  Soc Hx        NPO Detail:  No data recorded     Physical Exam    Airway  TM distance: >3 FB     Cardiovascular    Dental    Pulmonary    Abdominal            Anesthesia Plan    History of general anesthesia?: yes  History of complications of general anesthesia?: no    ASA 3     general     intravenous induction   Anesthetic plan and risks discussed with patient.

## 2024-11-19 NOTE — BRIEF OP NOTE
Date: 2024  OR Location: [facility]    Name: Stan Bee, : 1980, Age: 44 y.o., MRN: [id number], Sex: female    Diagnosis  Pre-op Diagnosis      * Plantar fasciitis [M72.2] Post-op Diagnosis     * Plantar fasciitis [M72.2]     Procedures  Fasciotomy Plantar Endoscopy  35097 - [place] ENDOSCOPIC PLANTAR FASCIOTOMY      Surgeons      * Ayde Monterroso - Primary    Resident/Fellow/Other Assistant:  Surgeons and Role:     * Bella Britt - Assisting    Staff:   Circulator: Greg Pickett Person: Jessi    Anesthesia Staff: Anesthesiologist: Apollo Montenegro DO    Procedure Summary  Anesthesia: General  ASA: III  Estimated Blood Loss: 5mL  Intra-op Medications:   Administrations occurring from 0730 to 0900 on 24:   Medication Name Total Dose   BUPivacaine HCl (Marcaine) 0.5 % (5 mg/mL) injection 5 mL   lidocaine-epinephrine (Xylocaine W/EPI) 1 %-1:100,000 injection 5 mL   dexAMETHasone (Decadron) injection 4 mg   fentaNYL PF 0.05 mg/mL 100 mcg   ketorolac (Toradol) 30 mg 30 mg   LR infusion 79.9 mL   ondansetron 2 mg/mL 4 mg   propofol (Diprivan) injection 10 mg/mL 200 mg              Anesthesia Record               Intraprocedure I/O Totals          Intake    LR infusion 500.00 mL    Total Intake 500 mL          Specimen: No specimens collected               Findings: see op report    Complications:  None; patient tolerated the procedure well.     Disposition: PACU - hemodynamically stable.  Condition: stable  Specimens Collected: No specimens collected  Attending Attestation: I was present and scrubbed for the entire procedure.    Ayde Monterroso  Phone Number: [phone]

## 2024-11-19 NOTE — POST-PROCEDURE NOTE
1006- pt brought back from pacu,verbal report received. Pt is alert and awake. Snack and drink given. Rx to go called. Pt calling family for pickup.    1035- vss. Dc instructions given and explained to pt. Pt verbally understood. Pt has boot at bedside that Dr. Monterroso said she can wear. Pt given crutches as well for non wt bearing status. Pt tolerating snack and drink.    1045- pt getting dressed at this time.    1105- pt decided she wanted a post op shoe instead of boot she was using. ride now at bedside.    1117- transport at bedside for dc.

## 2024-11-19 NOTE — ANESTHESIA POSTPROCEDURE EVALUATION
Patient: Stan Bee    Procedure Summary       Date: 11/19/24 Room / Location: [facility] OR 05 / [facility]    Anesthesia Start: 0755 Anesthesia Stop: 0828    Procedure: Fasciotomy Plantar Endoscopy (Right: Foot) Diagnosis:       Plantar fasciitis      (Right plantar fasciitis.)    Surgeons: Ayde Monterroso DPM Responsible Provider: Apollo Montenegro DO    Anesthesia Type: general ASA Status: 3            Anesthesia Type: general    Vitals Value Taken Time   BP  11/19/24 0828   Temp  11/19/24 0828   Pulse  11/19/24 0828   Resp  11/19/24 0828   SpO2  11/19/24 0828       Anesthesia Post Evaluation    Patient location during evaluation: bedside  Patient participation: complete - patient participated  Level of consciousness: awake  Pain management: adequate  Airway patency: patent  Cardiovascular status: acceptable  Respiratory status: acceptable  Hydration status: acceptable  Postoperative Nausea and Vomiting: none        There were no known notable events for this encounter.

## 2024-11-21 ENCOUNTER — APPOINTMENT (OUTPATIENT)
Dept: SURGICAL ONCOLOGY | Facility: CLINIC | Age: 44
End: 2024-11-21
Payer: COMMERCIAL

## 2024-11-30 DIAGNOSIS — I10 PRIMARY HYPERTENSION: ICD-10-CM

## 2024-12-01 DIAGNOSIS — I10 PRIMARY HYPERTENSION: ICD-10-CM

## 2024-12-02 RX ORDER — CHLORTHALIDONE 25 MG/1
25 TABLET ORAL DAILY
Qty: 90 TABLET | Refills: 0 | Status: SHIPPED | OUTPATIENT
Start: 2024-12-02

## 2024-12-02 RX ORDER — POTASSIUM CHLORIDE 750 MG/1
20 TABLET, EXTENDED RELEASE ORAL DAILY
Qty: 180 TABLET | Refills: 1 | Status: SHIPPED | OUTPATIENT
Start: 2024-12-02

## 2024-12-15 ENCOUNTER — OFFICE VISIT (OUTPATIENT)
Dept: URGENT CARE | Age: 44
End: 2024-12-15
Payer: COMMERCIAL

## 2024-12-15 VITALS
WEIGHT: 293 LBS | TEMPERATURE: 98.5 F | RESPIRATION RATE: 16 BRPM | BODY MASS INDEX: 54.93 KG/M2 | OXYGEN SATURATION: 96 % | HEART RATE: 89 BPM | SYSTOLIC BLOOD PRESSURE: 116 MMHG | DIASTOLIC BLOOD PRESSURE: 75 MMHG

## 2024-12-15 DIAGNOSIS — B02.9 ZOSTER WITHOUT COMPLICATIONS: Primary | ICD-10-CM

## 2024-12-15 DIAGNOSIS — H10.9 CONJUNCTIVITIS OF LEFT EYE, UNSPECIFIED CONJUNCTIVITIS TYPE: ICD-10-CM

## 2024-12-15 PROCEDURE — 99203 OFFICE O/P NEW LOW 30 MIN: CPT | Performed by: PHYSICIAN ASSISTANT

## 2024-12-15 PROCEDURE — 3074F SYST BP LT 130 MM HG: CPT | Performed by: PHYSICIAN ASSISTANT

## 2024-12-15 PROCEDURE — 3078F DIAST BP <80 MM HG: CPT | Performed by: PHYSICIAN ASSISTANT

## 2024-12-15 RX ORDER — VALACYCLOVIR HYDROCHLORIDE 1 G/1
1000 TABLET, FILM COATED ORAL 3 TIMES DAILY
Qty: 21 TABLET | Refills: 0 | Status: SHIPPED | OUTPATIENT
Start: 2024-12-15 | End: 2024-12-22

## 2024-12-15 RX ORDER — OFLOXACIN 3 MG/ML
1 SOLUTION/ DROPS OPHTHALMIC 4 TIMES DAILY
Qty: 5 ML | Refills: 0 | Status: SHIPPED | OUTPATIENT
Start: 2024-12-15 | End: 2024-12-22

## 2024-12-15 ASSESSMENT — PAIN SCALES - GENERAL: PAINLEVEL_OUTOF10: 7

## 2024-12-15 NOTE — PROGRESS NOTES
Subjective   Patient ID: Stan Bee is a 44 y.o. female. They present today with a chief complaint of Insect Bite (Left side of face around eye x 3 days) and Eye Problem (Painful red ).    History of Present Illness  Patient is a very pleasant 44-year-old -American female, history of hypertension hyperlipidemia, presenting to the clinic for chief complaint of rash.  Patient is reporting 3-day history of a rash over her left forehead.  Left upper eyelid.  Patient unsure if something bit her in her sleep.  She is also reporting some left eye redness and mucopurulent discharge.  She denies any double or blurry vision.  Denies any eye pain.  No light sensitivity.  No pain with extraocular movements.  She has no further complaints at this time denies any recent illness including fever or chills.  No chest pain or shortness of breath.  No upper respiratory congestion cough or rhinorrhea.  No abdominal pain, nausea, vomiting.  No myalgias, arthralgias, generalized weakness, fatigue, numbness, tingling, or focal weakness.      Eye Problem      Past Medical History  Allergies as of 12/15/2024 - Reviewed 12/15/2024   Allergen Reaction Noted    Bee venom protein (honey bee) Anaphylaxis 07/26/2005    Latex Anaphylaxis, Hives, and Other 02/05/2018    Cyclobenzaprine Unknown 05/23/2023    House dust Unknown 02/05/2018    Lisinopril Other 05/23/2023       (Not in a hospital admission)         Past Medical History:   Diagnosis Date    Hypertension     Other specified disorders of cornea, bilateral 10/23/2016    Corneal irritation, bilateral    Personal history of malignant neoplasm of cervix uteri 11/12/2014    History of cervical cancer    Sleep apnea     Unspecified acute conjunctivitis, bilateral 10/23/2016    Acute bacterial conjunctivitis of both eyes       Past Surgical History:   Procedure Laterality Date    CERVICAL BIOPSY  W/ LOOP ELECTRODE EXCISION  11/12/2014    Cervical Loop Electrosurgical Excision (LEEP)      SECTION, CLASSIC  2013     Section     SECTION, CLASSIC  2013     Section     SECTION, CLASSIC  2017     Section    CHOLECYSTECTOMY  2013    Cholecystectomy    HYSTERECTOMY      OTHER SURGICAL HISTORY  2021    Total hysterectomy with removal of both tubes and ovaries    OTHER SURGICAL HISTORY  2017    Cervix Cryosurgery    TUBAL LIGATION  2014    Tubal Ligation        reports that she quit smoking about 8 years ago. Her smoking use included cigarettes. She has never used smokeless tobacco. She reports current alcohol use. She reports that she does not use drugs.    Review of Systems  Review of Systems                               Objective    Vitals:    12/15/24 1447   BP: 116/75   Pulse: 89   Resp: 16   Temp: 36.9 °C (98.5 °F)   TempSrc: Oral   SpO2: 96%   Weight: 145 kg (320 lb)     No LMP recorded. Patient has had a hysterectomy.    Physical Exam  Gen.: Vitals noted no distress afebrile. Normal phonation, no stridor, no trismus.     Optho: Exam of the right eye is unremarkable. Exam of the left eye shows conjunctival injection. There are no obvious foreign bodies. The lids were everted without foreign bodies as well. Tetracaine and fluorescein were instilled with resolution of the patient's pain. Was viewed under the woods lamp. There did not appear to be any dye uptake, ulcerations, abrasions, or dendrites. Negative Aleisha sign. There is no direct or consensual photophobia. No pre-or post-septal cellulitis.     ENT: TMs clear bilaterally, EACs unremarkable. Mastoids nontender. Posterior oropharynx without erythema, exudate, or swelling. Uvula is in the midline and nonedematous.     Neck: Supple. No meningismus through full range of motion. No lymphadenopathy.     Cardiac: Regular rate rhythm no murmur.     Abdomen: Soft nontender nonsurgical throughout. Normoactive bowel sounds.     Extremities: No peripheral edema,  negative Homans bilaterally no cords.     Skin: There is a rash consisting of clusters of vesicular-like lesions over the patient's forehead left eyebrow and left upper eyelid.  Some already improved and crusted over.  There is no surrounding erythema induration or warmth.    Neuro: No focal neurologic deficits.     Procedures    Point of Care Test & Imaging Results from this visit    No results found.    Diagnostic study results (if any) were reviewed by Duc Deleon PA-C.    Assessment/Plan   Allergies, medications, history, and pertinent labs/EKGs/Imaging reviewed by Duc Deleon PA-C.     Medical Decision Making  Patient was seen eval in the clinic with complaint of rash.  On exam patient is nontoxic well-appearing respite comfortably no acute distress.  Vital signs are stable, afebrile.  Chest is clear, heart is regular, belly soft and nontender.  Evaluation of the left and skin as above concerning for zoster.  Considering left eye injection a full fluorescein stain was performed and there were no dendritic lesions noted on examination.  Will cover the eye with ofloxacin drops and will be placed on a 7-day course of valacyclovir 1 g 3 times daily.  Advise she follow very close with her primary care physician in the next week.  I advised if her eye redness worsens in any way to immediately report to the ED for further evaluation by ophthalmology.  I had a very extensive conversation with the patient regarding the specific dermatome affected and potential complications including zoster ophthalmicus.  Patient is aware of the risks and report to ED precautions were extensively reviewed and patient expresses understanding.  She will be discharged home at this time.  I reviewed my impression, plan, strict report to ED precautions with the patient.  She expresses understanding and agreement plan of care.    Orders and Diagnoses  There are no diagnoses linked to this encounter.      Medical Admin  Record      Follow Up Instructions  No follow-ups on file.    Patient disposition: Home    Electronically signed by Duc Deleon PA-C  3:07 PM

## 2025-02-26 ENCOUNTER — APPOINTMENT (OUTPATIENT)
Dept: PRIMARY CARE | Facility: CLINIC | Age: 45
End: 2025-02-26
Payer: COMMERCIAL

## 2025-02-28 DIAGNOSIS — I10 PRIMARY HYPERTENSION: ICD-10-CM

## 2025-02-28 RX ORDER — CHLORTHALIDONE 25 MG/1
25 TABLET ORAL DAILY
Qty: 90 TABLET | Refills: 0 | Status: SHIPPED | OUTPATIENT
Start: 2025-02-28

## 2025-03-07 ENCOUNTER — OFFICE VISIT (OUTPATIENT)
Dept: PAIN MEDICINE | Facility: HOSPITAL | Age: 45
End: 2025-03-07
Payer: COMMERCIAL

## 2025-03-07 DIAGNOSIS — M54.16 LUMBAR RADICULOPATHY: Primary | ICD-10-CM

## 2025-03-07 PROCEDURE — 1036F TOBACCO NON-USER: CPT | Performed by: NURSE PRACTITIONER

## 2025-03-07 PROCEDURE — 99214 OFFICE O/P EST MOD 30 MIN: CPT | Performed by: NURSE PRACTITIONER

## 2025-03-07 NOTE — PROGRESS NOTES
Chief Complaint/HPI:  Stan Bee is here today with back pain. She underwent a L5-S1 interlaminar epidural with Dr. Matt 10/2024. This provided her 85-90% relief. For the past month she has noticed a return of her pain. Pain now is across low back and extends to bilateral legs, not at the same time, but pain extends to both legs. Described as burning pain, constant.  She takes tylenol and Mobic currently. Also uses Voltaren gel prn. She tried lidocaine patches but she is sensitive to the adhesive and breaks out in a rash. Currently going to aqua therapy 2 x per week.     ROS otherwise negative aside from what was mentioned above in HPI.      Patient Active Problem List   Diagnosis    Other hyperlipidemia    H/O degenerative disc disease    Known medical problems    Abrasion of neck    Abscess of buttock, left    Achilles tendinitis of both lower extremities    Asthma    Atypical chest pain    Bariatric surgery status    Burn of foot    Achilles tendinosis    Cellulitis of foot, right    Dermatitis    Cervical strain    Constipation    Lower abdominal tenderness    Vaginal itching    Vaginal itching    Cutaneous T-cell lymphoma (Multi)    Cyst of skin and subcutaneous tissue    Dentoalveolar abscess    Edema    Fatigue    Finger dysfunction    Frontal headache    History of medication noncompliance    Hypertension    Influenza    Injury of right leg    Iron deficiency anemia    Chronic right shoulder pain    Left shoulder pain    Pain of right upper extremity    Left shoulder strain    Leg swelling    Leukocytes in urine    Lymphedema of both lower extremities    Macromastia    Morbid obesity (Multi)    Neck pain    Nerve pain    EDISON (obstructive sleep apnea)    Paronychia of great toe    Onychomycosis    Ankle pain    Foot pain    Pedal edema    Plantar fasciitis, bilateral    Radiculopathy    Rash and other nonspecific skin eruption    Right ankle sprain    Right hip pain    Sacroiliitis (CMS-HCC)    Sinus  pressure    Spotting    Low back pain    Upper back pain    Upper back strain    Upper extremity weakness    Vaginal candidiasis    Vaginal discharge    Yeast UTI    Trichomonas exposure    Current vaping on some days    Costochondritis    Shift work sleep disorder     Past Medical History:   Diagnosis Date    Hypertension     Other specified disorders of cornea, bilateral 10/23/2016    Corneal irritation, bilateral    Personal history of malignant neoplasm of cervix uteri 2014    History of cervical cancer    Sleep apnea     Unspecified acute conjunctivitis, bilateral 10/23/2016    Acute bacterial conjunctivitis of both eyes     Past Surgical History:   Procedure Laterality Date    CERVICAL BIOPSY  W/ LOOP ELECTRODE EXCISION  2014    Cervical Loop Electrosurgical Excision (LEEP)     SECTION, CLASSIC  2013     Section     SECTION, CLASSIC  2013     Section     SECTION, CLASSIC  2017     Section    CHOLECYSTECTOMY  2013    Cholecystectomy    HYSTERECTOMY      OTHER SURGICAL HISTORY  2021    Total hysterectomy with removal of both tubes and ovaries    OTHER SURGICAL HISTORY  2017    Cervix Cryosurgery    TUBAL LIGATION  2014    Tubal Ligation     Family History   Problem Relation Name Age of Onset    Breast cancer Mother      Other (mva) Father      Allergies Other      Asthma Other      Eczema Other       Social History     Tobacco Use    Smoking status: Former     Current packs/day: 0.00     Types: Cigarettes     Quit date:      Years since quittin.1    Smokeless tobacco: Never   Vaping Use    Vaping status: Some Days    Substances: Flavoring    Devices: Disposable, Pre-filled pod   Substance Use Topics    Alcohol use: Yes     Comment: occasionally LESS THAN 1 X MONTH    Drug use: Never     ALLERGIES  Allergies   Allergen Reactions    Bee Venom Protein (Honey Bee) Anaphylaxis    Latex Anaphylaxis, Hives and  Other    Cyclobenzaprine Unknown     hallucinations    House Dust Unknown     Pollen and dust allergy      Causes watery eyes    Lisinopril Other     MEDICATIONS  Current Outpatient Medications   Medication Sig Dispense Refill    acetaminophen (Tylenol) 500 mg tablet Take 1 tablet (500 mg) by mouth every 6 hours if needed for mild pain (1 - 3) or moderate pain (4 - 6).      albuterol 90 mcg/actuation inhaler Inhale 2 puffs every 6 hours if needed.      cephalexin (Keflex) 500 mg capsule Take 1 capsule (500 mg) by mouth 2 times a day.      cetirizine (ZyrTEC) 10 mg tablet TAKE 1 TABLET BY MOUTH EVERY DAY 30 tablet 1    chlorthalidone (Hygroton) 25 mg tablet TAKE 1 TABLET BY MOUTH EVERY DAY 90 tablet 0    cholecalciferol (Vitamin D3) 50 MCG (2000 UT) tablet Take 1 tablet (50 mcg) by mouth once daily. 30 tablet 11    clobetasol (Temovate) 0.05 % cream Apply 1 Application topically twice a day.      docusate sodium (Colace) 50 mg capsule Take 2 capsules (100 mg) by mouth once daily.      hydrocortisone 1 % cream APPLY SPARINGLY AND RUB IN WELL TO AFFECTED AREA(S) AS DIRECTED.      ketoconazole (NIZOral) 2 % cream 1 Application      losartan (Cozaar) 50 mg tablet Take 1 tablet (50 mg) by mouth once daily. 90 tablet 2    potassium chloride CR 10 mEq ER tablet TAKE 2 TABLETS (20 MEQ) BY MOUTH ONCE DAILY. 180 tablet 1     No current facility-administered medications for this visit.     PHYSICAL EXAM  Gen: Alert, NAD  HEENT:  PERRLA, EOMI, conjunctiva and sclera normal in appearance  Respiratory: Non labored, no SOB  Cardiovascular:  Heart RRR  MSK: +TTP lumbar paraspinal areas into hips bilaterally, +SLR on right  Neuro:  Gross motor and sensory intact  Skin:  No suspicious lesions present    MRI of the lumbar spine without IV contrast;  3/15/2024 10:17 am      INDICATION:  Signs/Symptoms:pain.      COMPARISON:  10/18/2021.      ACCESSION NUMBER(S):  [id number]      ORDERING CLINICIAN:  LUCIAN STEVENS       TECHNIQUE:  Sagittal and axial STIR and T1-weighted MRI images of the lumbar  spine were acquired using a spondylolysis protocol.  No contrast was  administered.      FINDINGS:  For counting purposes the last lumbarized vertebral body is labeled  L5.      Alignment, Vertebral body heights and marrow signal pattern are  within normal limits.      Minimal disc height loss at T9-T10 T10-T11. Intervertebral disc  spaces are otherwise maintained.      The conus terminates at L1-L2 and is unremarkable.      Prevertebral soft tissues are not thickened.      Evaluation by level:      There are degenerative changes in the lower thoracic spine without  significant spinal canal stenosis. At least mild neural foraminal  narrowing at T10-T11. No axial images were performed through this  level.      T11-T12: No spinal canal or neural foraminal stenosis      T12-L1: No spinal canal or neural foraminal stenosis      L1-L2: No spinal canal or neural foraminal stenosis      L2-L3: Minimal right eccentric disc bulge and facet arthrosis. No  spinal canal or neural foraminal stenosis      L3-L4: No spinal canal or neural foraminal stenosis.      L4-L5: Mild disc bulge and facet arthrosis. No spinal canal stenosis.  Mild neural foraminal narrowing.      L5-S1: Mild disc bulge and facet arthrosis. No spinal canal stenosis.  Mild neural foraminal stenosis.      IMPRESSION:  Mild multilevel degenerative changes without significant spinal canal  stenosis. Mild neural foraminal narrowing at L4-L5 and L5-S1.      There are degenerative changes in the lower thoracic spine without  significant spinal canal stenosis. At least mild neural foraminal  narrowing at T10-T11. No axial images were performed through this  level.      I personally reviewed the images/study and I agree with the findings  as stated. This study was interpreted at [facility], [place], [place].      MACRO:  None      Signed by: Vijaya  Heidy 3/15/2024 10:48 AM  ASSESSMENT/PLAN  Problem List Items Addressed This Visit       Radiculopathy    Relevant Orders    FL pain management    Epidural Steroid Injection     -Will plan for repeat L5-S1 interlaminar epidural under fluoroscopy with Dr. Matt.  -Continue pain medications as currently prescribed.  -Continue aqua therapy as directed.    Fern Brownlee, INGRID-CNP

## 2025-03-30 DIAGNOSIS — I10 PRIMARY HYPERTENSION: ICD-10-CM

## 2025-03-30 NOTE — H&P
HISTORY AND PHYSICAL UPDATE FOR PROCEDURE    History Of Present Illness  Stan Bee is a 44 y.o. female presenting with back and leg pain d/t lumbar radiculitis.  Here for Lumbar interlaminar epidural steroid injection, likely L5-S1    This note is intended to be an update to the H&P from the last office consult note. Please refer to the last pain management consult note for full H&P.    she denies any recent antibiotic use or infections, she denies any blood thinner use , and she denies contrast or local anesthetic allergies     Pre-sedation evaluation:  ASA Classification (bolded):   ASA I: Healthy patient, non-smoking, no none or minimal alcohol use  ASA II: Patient with mild systemic disease, without substantiative functional limitations.  Current smoker, social alcohol drinker, pregnancy, obesity (BMI 30-40)DM/HTN,, well-controlled mild lung disease  ASA III: Patient with severe systemic disease; substantiative of functional limitation; One or more moderate to severe diseases: Poorly controlled DM/HTN, COPD, morbid obesity (BMI>40), active hepatitis, alcohol abuse/dependence, implanted pacemaker, moderate reduction of ejection fraction, ESRD on dialysis, history (>3months) of MI, CVA, TIA or CAD/stents  ASA IV: Patient with severe systemic disease that is a constant threat to life; recent (<3 months) MI, CVA, TIA or CAD/stents, ongoing cardiac ischemia or severe valvular dysfunction, severely reduced ejection fraction, shock, sepsis, DIC, ESRD not undergoing regular scheduled dialysis    Mallampati score (bolded):   Class I: Complete visualization of the soft palate  Class II: Complete visualization of the uvula  Class III: Visualization of only the base of the uvula  Class IV: Soft palate is not visible at all    Past Medical History  Past Medical History:   Diagnosis Date    Hypertension     Other specified disorders of cornea, bilateral 10/23/2016    Corneal irritation, bilateral    Personal history of  malignant neoplasm of cervix uteri 2014    History of cervical cancer    Sleep apnea     Unspecified acute conjunctivitis, bilateral 10/23/2016    Acute bacterial conjunctivitis of both eyes       Surgical History  Past Surgical History:   Procedure Laterality Date    CERVICAL BIOPSY  W/ LOOP ELECTRODE EXCISION  2014    Cervical Loop Electrosurgical Excision (LEEP)     SECTION, CLASSIC  2013     Section     SECTION, CLASSIC  2013     Section     SECTION, CLASSIC  2017     Section    CHOLECYSTECTOMY  2013    Cholecystectomy    HYSTERECTOMY      OTHER SURGICAL HISTORY  2021    Total hysterectomy with removal of both tubes and ovaries    OTHER SURGICAL HISTORY  2017    Cervix Cryosurgery    TUBAL LIGATION  2014    Tubal Ligation        Social History  She reports that she quit smoking about 9 years ago. Her smoking use included cigarettes. She has never used smokeless tobacco. She reports current alcohol use. She reports that she does not use drugs.    Family History  Family History   Problem Relation Name Age of Onset    Breast cancer Mother      Other (mva) Father      Allergies Other      Asthma Other      Eczema Other          Allergies  Bee venom protein (honey bee), Latex, Cyclobenzaprine, House dust, and Lisinopril    Review of Systems   12 point ROS done and negative except for the above.   Physical Exam     General: NAD, well groomed, well nourished  Eyes: Non-icteric sclera, EOMI  Ears, Nose, Mouth, and Throat: External ears and nose appear to be without deformity or rash. No lesions or masses noted. Hearing is grossly intact.   Neck: Trachea midline  Respiratory: Nonlabored breathing   Cardiovascular: No peripheral edema   Skin: No rashes or open lesions/ulcers identified on skin.    Last Recorded Vitals  There were no vitals taken for this visit.    Relevant Results  Current Outpatient Medications  "  Medication Instructions    acetaminophen (TYLENOL) 500 mg, Every 6 hours PRN    albuterol 90 mcg/actuation inhaler 2 puffs, inhalation, Every 6 hours PRN    cephalexin (KEFLEX) 500 mg, 2 times daily    cetirizine (ZyrTEC) 10 mg tablet TAKE 1 TABLET BY MOUTH EVERY DAY    chlorthalidone (HYGROTON) 25 mg, oral, Daily    clobetasol (Temovate) 0.05 % cream 1 Application, 2 times daily    docusate sodium (COLACE) 100 mg, Daily    hydrocortisone 1 % cream APPLY SPARINGLY AND RUB IN WELL TO AFFECTED AREA(S) AS DIRECTED.    ketoconazole (NIZOral) 2 % cream 1 Application    losartan (COZAAR) 50 mg, oral, Daily    potassium chloride CR 10 mEq ER tablet 20 mEq, oral, Daily      Lab Results   Component Value Date    WBC 7.7 11/15/2024    HGB 11.8 (L) 11/15/2024    HCT 37.5 11/15/2024    MCV 91 11/15/2024     11/15/2024      Lab Results   Component Value Date    INR CANCELED 07/26/2023    INR 1.1 07/26/2023    PROTIME CANCELED 07/26/2023    PROTIME 12.5 07/26/2023     No results found for: \"PTT\"  Lab Results   Component Value Date    GLUCOSE 120 (H) 11/15/2024    CALCIUM 9.9 11/15/2024     11/15/2024    K 3.5 11/15/2024    CO2 29 11/15/2024     11/15/2024    BUN 14 11/15/2024    CREATININE 0.67 11/15/2024       === 10/05/24 ===    MR ANKLE RIGHT WO CONTRAST    - Impression -  1. Severe tendinopathy of the distal Achilles tendon with  retrocalcaneal bursitis. No Achilles tendon tear seen.  2. Moderate thickening with increased intrasubstance signal within  the plantar aponeurosis. Underlying plantar fasciitis high in the  differential.  3. Small amount of ankle joint fluid.      I personally reviewed the images/study and I agree with the findings  as stated. This study was interpreted at [place], [place].    MACRO:  None    Signed by: Erik Shelton 10/6/2024 8:44 PM  Dictation workstation:   [id number]       Assessment/Plan   Stan Bee is a 44 y.o. F who " presents for Lumbar interlaminar epidural steroid injection.     Risks, benefits, alternatives discussed. All questions answered to the best of my ability. Patient agrees to proceed. Consent signed and patient marked appropriately.    -We will proceed with planned procedure  -Discussed with the patient that once appropriate from a recovery standpoint, they should continue physical therapy exercises at least 2-3 times per week for best long term outcomes  - discussed with the patient that if they take blood thinners, they may resume them in 24hrs        Lion Petit MD  Interventional Pain Fellow, PGY-5  [facility]

## 2025-03-31 ENCOUNTER — HOSPITAL ENCOUNTER (OUTPATIENT)
Facility: HOSPITAL | Age: 45
Discharge: HOME | End: 2025-03-31
Payer: COMMERCIAL

## 2025-03-31 VITALS
HEART RATE: 76 BPM | DIASTOLIC BLOOD PRESSURE: 83 MMHG | SYSTOLIC BLOOD PRESSURE: 126 MMHG | OXYGEN SATURATION: 100 % | TEMPERATURE: 99.3 F | RESPIRATION RATE: 18 BRPM

## 2025-03-31 DIAGNOSIS — M54.16 LUMBAR RADICULITIS: ICD-10-CM

## 2025-03-31 DIAGNOSIS — M54.16 LUMBAR RADICULOPATHY: ICD-10-CM

## 2025-03-31 DIAGNOSIS — M51.9 LUMBAR DISC DISEASE: ICD-10-CM

## 2025-03-31 PROCEDURE — 2550000001 HC RX 255 CONTRASTS: Performed by: ANESTHESIOLOGY

## 2025-03-31 PROCEDURE — 62323 NJX INTERLAMINAR LMBR/SAC: CPT | Performed by: ANESTHESIOLOGY

## 2025-03-31 PROCEDURE — 2500000004 HC RX 250 GENERAL PHARMACY W/ HCPCS (ALT 636 FOR OP/ED): Mod: JZ | Performed by: ANESTHESIOLOGY

## 2025-03-31 RX ORDER — MIDAZOLAM HYDROCHLORIDE 1 MG/ML
INJECTION, SOLUTION INTRAMUSCULAR; INTRAVENOUS
Status: COMPLETED | OUTPATIENT
Start: 2025-03-31 | End: 2025-03-31

## 2025-03-31 RX ORDER — MELOXICAM 15 MG/1
15 TABLET ORAL DAILY
COMMUNITY

## 2025-03-31 RX ORDER — METHYLPREDNISOLONE ACETATE 40 MG/ML
INJECTION, SUSPENSION INTRA-ARTICULAR; INTRALESIONAL; INTRAMUSCULAR; SOFT TISSUE
Status: COMPLETED | OUTPATIENT
Start: 2025-03-31 | End: 2025-03-31

## 2025-03-31 RX ORDER — LIDOCAINE HYDROCHLORIDE 5 MG/ML
INJECTION, SOLUTION INFILTRATION; INTRAVENOUS
Status: COMPLETED | OUTPATIENT
Start: 2025-03-31 | End: 2025-03-31

## 2025-03-31 RX ORDER — LOSARTAN POTASSIUM 50 MG/1
50 TABLET ORAL DAILY
Qty: 90 TABLET | Refills: 2 | Status: SHIPPED | OUTPATIENT
Start: 2025-03-31

## 2025-03-31 RX ADMIN — IOHEXOL 2 ML: 240 INJECTION, SOLUTION INTRATHECAL; INTRAVASCULAR; INTRAVENOUS; ORAL at 11:00

## 2025-03-31 RX ADMIN — LIDOCAINE HYDROCHLORIDE 8 ML: 5 INJECTION, SOLUTION INFILTRATION at 11:00

## 2025-03-31 RX ADMIN — MIDAZOLAM 2 MG: 1 INJECTION INTRAMUSCULAR; INTRAVENOUS at 10:55

## 2025-03-31 RX ADMIN — METHYLPREDNISOLONE ACETATE 40 MG: 40 INJECTION, SUSPENSION INTRA-ARTICULAR; INTRALESIONAL; INTRAMUSCULAR; SOFT TISSUE at 11:00

## 2025-03-31 ASSESSMENT — PAIN - FUNCTIONAL ASSESSMENT
PAIN_FUNCTIONAL_ASSESSMENT: 0-10
PAIN_FUNCTIONAL_ASSESSMENT: WONG-BAKER FACES
PAIN_FUNCTIONAL_ASSESSMENT: 0-10
PAIN_FUNCTIONAL_ASSESSMENT: 0-10
PAIN_FUNCTIONAL_ASSESSMENT: WONG-BAKER FACES

## 2025-03-31 ASSESSMENT — PAIN SCALES - GENERAL
PAINLEVEL_OUTOF10: 0 - NO PAIN
PAINLEVEL_OUTOF10: 6
PAINLEVEL_OUTOF10: 0 - NO PAIN

## 2025-03-31 ASSESSMENT — COLUMBIA-SUICIDE SEVERITY RATING SCALE - C-SSRS
2. HAVE YOU ACTUALLY HAD ANY THOUGHTS OF KILLING YOURSELF?: NO
6. HAVE YOU EVER DONE ANYTHING, STARTED TO DO ANYTHING, OR PREPARED TO DO ANYTHING TO END YOUR LIFE?: NO
1. IN THE PAST MONTH, HAVE YOU WISHED YOU WERE DEAD OR WISHED YOU COULD GO TO SLEEP AND NOT WAKE UP?: NO

## 2025-03-31 NOTE — DISCHARGE INSTRUCTIONS
DISCHARGE INSTRUCTIONS FOR INJECTIONS     You underwent Lumbar interlaminar epidural steroid injection today    After most injections, it is recommended that you relax and limit your activity for the remainder of the day unless you have been told otherwise by your pain physician.  You should not drive a car, operate machinery, or make important legal decisions unless otherwise directed by your pain physician.  You may resume your normal activity, including exercise, tomorrow.      Keep a written pain diary of how much pain relief you experienced following the injection procedure and the length of time of pain relief you experienced pain relief. Following diagnostic injections like medial branch nerve blocks, sacroiliac joint blocks, stellate ganglion injections and other blocks, it is very important you record the specific amount of pain relief you experienced immediately after the injectionand how long it lasted. Your doctor will ask you for this information at your follow up visit.     For all injections, please keep the injection site dry and inspect the site for a couple of days. You may remove the Band-Aid the day of the injection at any time.     Some discomfort, bruising or slight swelling may occur at the injection site. This is not abnormal if it occurs.  If needed you may:    -Take over the counter medication such as Tylenol or Motrin.   -Apply an ice pack for 30 minutes, 2 to 3 times a day for the first 24 hours.     You may shower today; no soaking baths, hot tubs, whirlpools or swimming pools for two days.      If you are given steroids in your injection, it may take 3-5 days for the steroid medication to take effect. You may notice a worsening of your symptoms for 1-2 days after the injection. This is not abnormal.  You may use acetaminophen, ibuprofen, or prescription medication that your doctor may have prescribed for you if you need to do so.     A few common side effects of steroids include facial  flushing, sweating, restlessness, irritability,difficulty sleeping, increase in blood sugar, and increased blood pressure. If you have diabetes, please monitor your blood sugar at least once a day for at least 5 days. If you have poorly controlled high blood pressure, monitoryour blood pressure for at least 2 days and contact your primary care physician if these numbers are unusually high for you.      If you take aspirin or non-steroidal anti-inflammatory drugs (examples are Motrin, Advil, ibuprofen, Naprosyn, Voltaren, Relafen, etc.) you may restart these this evening, but stop taking it 3 days before your next appointment, unless instructed otherwiseby your physician.      You do not need to discontinue non-aspirin-containing pain medications prior to an injection (examples: Celebrex, tramadol, hydrocodone and acetaminophen).      If you take a blood thinning medication (Coumadin, Lovenox, Fragmin,Ticlid, Plavix, Pradaxa, etc.), please discuss this with your primary care physician/cardiologist and your pain physician. These medications MUST be discontinued before you can have an injection safely, without the risk of uncontrolled bleeding. If these medications are not discontinued for an appropriate period of time, you will not be able to receivean injection. Please adhere to instructions given to you about when to restart your blood thinning medication. If you have any questions please reach out to our team.    If you are taking Coumadin, please have your INR checked the morning of your procedure and bring the result to your appointment unless otherwise instructed. If your INR is over 1.2, your injection may need to be rescheduled to avoid uncontrolled bleeding from the needle placement.     Call [facility]  and ask for Pain Management at [phone] between 8am-4pm Monday - Friday if you are experiencing the following:    If you received an epidural or spinal injection:    -Headache that doesnot go away with  medicine, is worse when sitting or standing up, and is greatly relieved upon lying down.   -Severe pain worse than or different than your baseline pain.   -Chills or fever (101º F or greater).   -Drainage or signs of infection at the injection site     Go directly to the Emergency Department if you are experiencing the following and received an epidural or spinal injection:   -Abrupt weakness or progressive weakness in your legs that starts after you leave the clinic.   -Abrupt severe or worsening numbness in your legs.   -Inability to urinate after the injection or loss of bowel or bladder control without the urge to defecate or urinate.     If you have a clinical question that cannot wait until your next appointment, please call [phone] between 8am-4pm Monday - Friday or send a [employer] message. We do our best to return all non-emergency messages within 24 hours, Monday - Friday. A nurse or physician will return your message. You may also try calling and they will do their best to answer your question(s):  - Dr. Shaka Sal's nurse ([phone])  - Dr. Kelly Segal/Dr. Ashraf's nurse ([phone])  - Dr. Maegan Delgadillo/John's nurse ([phone])     If you need to cancel an appointment, please call the scheduling staff at [phone] during normal business hours or leave a message at least 24 hours in advance.     If you are going to be sedated for your next procedure, you MUST have responsible adult who can legally drive accompany you home. You cannot eat or drink for at least eight hours prior to the planned procedure if you are going to receive sedation. You may take your non-blood thinning medications with a small sip of water.

## 2025-03-31 NOTE — LETTER
March 31, 2025     Patient: Stan Bee   YOB: 1980   Date of Visit: 3/31/2025       To Whom It May Concern:    Stan Bee was seen in my clinic on 3/31/2025 at 10:30 am. Please excuse Stan for her absence from work on this day to make the appointment.    If you have any questions or concerns, please don't hesitate to call.         Sincerely,         Velma Matt MD        CC: No Recipients

## 2025-04-02 ENCOUNTER — TELEPHONE (OUTPATIENT)
Dept: PRIMARY CARE | Facility: CLINIC | Age: 45
End: 2025-04-02

## 2025-04-03 ENCOUNTER — APPOINTMENT (OUTPATIENT)
Dept: PRIMARY CARE | Facility: CLINIC | Age: 45
End: 2025-04-03
Payer: COMMERCIAL

## 2025-04-21 DIAGNOSIS — M46.1 SACROILIITIS (CMS-HCC): ICD-10-CM

## 2025-04-21 DIAGNOSIS — M54.16 LUMBAR RADICULOPATHY: ICD-10-CM

## 2025-04-21 DIAGNOSIS — M76.62 ACHILLES TENDINITIS OF BOTH LOWER EXTREMITIES: ICD-10-CM

## 2025-04-21 DIAGNOSIS — M76.61 ACHILLES TENDINITIS OF BOTH LOWER EXTREMITIES: ICD-10-CM

## 2025-04-25 ENCOUNTER — APPOINTMENT (OUTPATIENT)
Dept: PRIMARY CARE | Facility: CLINIC | Age: 45
End: 2025-04-25
Payer: COMMERCIAL

## 2025-04-28 ENCOUNTER — APPOINTMENT (OUTPATIENT)
Dept: PRIMARY CARE | Facility: CLINIC | Age: 45
End: 2025-04-28
Payer: COMMERCIAL

## 2025-05-13 ENCOUNTER — OFFICE VISIT (OUTPATIENT)
Dept: URGENT CARE | Age: 45
End: 2025-05-13
Payer: COMMERCIAL

## 2025-05-13 VITALS
DIASTOLIC BLOOD PRESSURE: 84 MMHG | TEMPERATURE: 97 F | OXYGEN SATURATION: 95 % | HEART RATE: 88 BPM | RESPIRATION RATE: 20 BRPM | SYSTOLIC BLOOD PRESSURE: 136 MMHG

## 2025-05-13 DIAGNOSIS — R23.2 HOT FLASHES: Primary | ICD-10-CM

## 2025-05-13 DIAGNOSIS — R68.89 FLU-LIKE SYMPTOMS: ICD-10-CM

## 2025-05-13 LAB
POC CORONAVIRUS SARS-COV-2 PCR: NEGATIVE
POC HUMAN RHINOVIRUS PCR: NEGATIVE
POC INFLUENZA A VIRUS PCR: NEGATIVE
POC INFLUENZA B VIRUS PCR: NEGATIVE
POC RESPIRATORY SYNCYTIAL VIRUS PCR: NEGATIVE

## 2025-05-13 ASSESSMENT — ENCOUNTER SYMPTOMS: NAUSEA: 1

## 2025-05-13 NOTE — PROGRESS NOTES
Subjective   Patient ID: Stan Bee is a 44 y.o. female. They present today with a chief complaint of Hot Flashes (Hot flashes / headache / nausea for a few days ).    History of Present Illness  HPI  This is a 44-year-old -American female presents today complaining of experiencing hot flashes for the past few days.  She admits to mild headache and occasional nausea.  She denies any fever or chills.  She denies any other symptoms.  Past Medical History  Allergies as of 05/13/2025 - Reviewed 05/13/2025   Allergen Reaction Noted    Bee venom protein (honey bee) Anaphylaxis 07/26/2005    Latex Anaphylaxis, Hives, and Other 02/05/2018    Cyclobenzaprine Unknown 05/23/2023    House dust Unknown 02/05/2018    Lisinopril Other 05/23/2023       Prescriptions Prior to Admission[1]     Medical History[2]    Surgical History[3]     reports that she quit smoking about 9 years ago. Her smoking use included cigarettes. She has never used smokeless tobacco. She reports current alcohol use. She reports that she does not use drugs.    Review of Systems  Review of Systems   Gastrointestinal:  Positive for nausea.   All other systems reviewed and are negative.                                 Objective    Vitals:    05/13/25 1309   BP: 136/84   Pulse: 88   Resp: 20   Temp: 36.1 °C (97 °F)   SpO2: 95%     No LMP recorded. Patient has had a hysterectomy.    Physical Exam  Patient is awake alert oriented x 3 in no acute distress vital signs are stable she is afebrile.  Nontoxic-appearing.  HEENT examination is unremarkable.  Cardiovascular is regular rate and rhythm.  Lungs are clear throughout.  Procedures    Point of Care Test & Imaging Results from this visit  Results for orders placed or performed in visit on 05/13/25   POCT SPOTFIRE R/ST Panel Mini w/COVID ([facility]) manually resulted    Specimen: Swab   Result Value Ref Range    POC Sars-Cov-2 PCR Negative Negative    POC Respiratory Syncytial Virus PCR Negative  Negative    POC Influenza A Virus PCR Negative Negative    POC Influenza B Virus PCR Negative Negative    POC Human Rhinovirus PCR Negative Negative      Imaging  No results found.    Cardiology, Vascular, and Other Imaging  No other imaging results found for the past 2 days      Diagnostic study results (if any) were reviewed by Owen Deleon DO.    Assessment/Plan   Allergies, medications, history, and pertinent labs/EKGs/Imaging reviewed by Owen Deleon DO.     Medical Decision Making  Patient was informed of her lab results.  She was then reassured and discharged home in satisfactory condition with instruction to follow-up with her primary physician as scheduled in 2 days to have her thyroid profile blood drawn.  The patient was also advised that her condition is might be premenopausal.    Orders and Diagnoses  Diagnoses and all orders for this visit:  Hot flashes  Flu-like symptoms  -     POCT SPOTFIRE R/ST Panel Mini w/COVID ([facility]) manually resulted      Medical Admin Record      Patient disposition: Home    Electronically signed by Owen Deleon DO  1:30 PM           [1] (Not in a hospital admission)   [2]   Past Medical History:  Diagnosis Date    Hypertension     Other specified disorders of cornea, bilateral 10/23/2016    Corneal irritation, bilateral    Personal history of malignant neoplasm of cervix uteri 2014    History of cervical cancer    Sleep apnea     Unspecified acute conjunctivitis, bilateral 10/23/2016    Acute bacterial conjunctivitis of both eyes   [3]   Past Surgical History:  Procedure Laterality Date    CERVICAL BIOPSY  W/ LOOP ELECTRODE EXCISION  2014    Cervical Loop Electrosurgical Excision (LEEP)     SECTION, CLASSIC  2013     Section     SECTION, CLASSIC  2013     Section     SECTION, CLASSIC  2017     Section    CHOLECYSTECTOMY  2013    Cholecystectomy    HYSTERECTOMY       OTHER SURGICAL HISTORY  08/05/2021    Total hysterectomy with removal of both tubes and ovaries    OTHER SURGICAL HISTORY  06/19/2017    Cervix Cryosurgery    TUBAL LIGATION  11/12/2014    Tubal Ligation

## 2025-05-13 NOTE — LETTER
May 13, 2025     Patient: Stan Bee   YOB: 1980   Date of Visit: 5/13/2025       To Whom It May Concern:    Stan Bee was seen in my clinic on 5/13/2025 at 1:15 pm. Please excuse Stan for her absence from work on this day to make the appointment.    If you have any questions or concerns, please don't hesitate to call.         Sincerely,         Owen Deleon,         CC: No Recipients

## 2025-05-15 ENCOUNTER — APPOINTMENT (OUTPATIENT)
Dept: PRIMARY CARE | Facility: CLINIC | Age: 45
End: 2025-05-15
Payer: COMMERCIAL

## 2025-05-15 VITALS
BODY MASS INDEX: 50.02 KG/M2 | HEIGHT: 64 IN | WEIGHT: 293 LBS | SYSTOLIC BLOOD PRESSURE: 126 MMHG | HEART RATE: 86 BPM | DIASTOLIC BLOOD PRESSURE: 79 MMHG | OXYGEN SATURATION: 96 %

## 2025-05-15 DIAGNOSIS — E66.813 CLASS 3 SEVERE OBESITY WITH SERIOUS COMORBIDITY AND BODY MASS INDEX (BMI) OF 50.0 TO 59.9 IN ADULT, UNSPECIFIED OBESITY TYPE: ICD-10-CM

## 2025-05-15 DIAGNOSIS — G47.33 OSA (OBSTRUCTIVE SLEEP APNEA): ICD-10-CM

## 2025-05-15 DIAGNOSIS — I10 PRIMARY HYPERTENSION: ICD-10-CM

## 2025-05-15 DIAGNOSIS — Z00.00 HEALTH CARE MAINTENANCE: ICD-10-CM

## 2025-05-15 DIAGNOSIS — L30.9 ECZEMA, UNSPECIFIED TYPE: Primary | ICD-10-CM

## 2025-05-15 DIAGNOSIS — R23.2 HOT FLASHES: ICD-10-CM

## 2025-05-15 PROCEDURE — 3008F BODY MASS INDEX DOCD: CPT | Performed by: STUDENT IN AN ORGANIZED HEALTH CARE EDUCATION/TRAINING PROGRAM

## 2025-05-15 PROCEDURE — 3078F DIAST BP <80 MM HG: CPT | Performed by: STUDENT IN AN ORGANIZED HEALTH CARE EDUCATION/TRAINING PROGRAM

## 2025-05-15 PROCEDURE — 3074F SYST BP LT 130 MM HG: CPT | Performed by: STUDENT IN AN ORGANIZED HEALTH CARE EDUCATION/TRAINING PROGRAM

## 2025-05-15 PROCEDURE — 99214 OFFICE O/P EST MOD 30 MIN: CPT | Performed by: STUDENT IN AN ORGANIZED HEALTH CARE EDUCATION/TRAINING PROGRAM

## 2025-05-15 RX ORDER — MAG HYDROX/ALUMINUM HYD/SIMETH 200-200-20
SUSPENSION, ORAL (FINAL DOSE FORM) ORAL 2 TIMES DAILY
Qty: 453.6 G | Refills: 2 | Status: SHIPPED | OUTPATIENT
Start: 2025-05-15

## 2025-05-15 RX ORDER — HYDROCORTISONE 1 %
CREAM (GRAM) TOPICAL
Status: CANCELLED | OUTPATIENT
Start: 2025-05-15

## 2025-05-15 RX ORDER — SEMAGLUTIDE 0.25 MG/.5ML
0.25 INJECTION, SOLUTION SUBCUTANEOUS WEEKLY
Qty: 2 ML | Refills: 0 | Status: SHIPPED | OUTPATIENT
Start: 2025-05-15 | End: 2025-06-06

## 2025-05-15 NOTE — PROGRESS NOTES
"Subjective   Patient ID: Stan Bee is a 44 y.o. female who presents for No chief complaint on file..  HPI  Patient is here for follow-up    Her main concerns today are-weight gain.  Is contemplating weight loss surgery but would like to try medications  Discussed pharmacological and nonpharmacological options, would like to start up with Adipex and then try Wegovy    Also has concern of ongoing hot flashes  Will get FSH and LH levels to check for menopause    Medical history significant for  1.  Hypertension on losartan 50 mg    #2 degenerative joint disease-seeing pain management  In the interim patient has had endoscopic fascial ectomy for plantar fasciitis    3.  Contemplating bariatric surgery at [facility]  Wants weight loss meds   Tried trulicity in the past     #4 sleep apnea on CPAP     #5, asthma      Healthcare maintenance  - Hysterectomy  Due for mammogram  - Due for colonoscopy    Tdap-2018    Medical History[1]   Surgical History[2]   Family History[3]   Allergies[4]       Occupation:     Review of Systems   Constitutional:  Negative for activity change and fever.   HENT:  Negative for congestion.    Respiratory:  Negative for cough, shortness of breath and wheezing.    Cardiovascular:  Negative for chest pain and leg swelling.   Gastrointestinal:  Negative for abdominal pain, constipation, nausea and vomiting.   Endocrine: Negative for cold intolerance.   Genitourinary:  Negative for dysuria, hematuria and urgency.   Neurological:  Negative for dizziness, speech difficulty, weakness and numbness.   Psychiatric/Behavioral:  Negative for self-injury and suicidal ideas.        Objective   Visit Vitals  /79 (BP Location: Left arm, Patient Position: Sitting, BP Cuff Size: Large adult)   Pulse 86   Ht 1.626 m (5' 4\")   Wt (!) 150 kg (331 lb)   SpO2 96%   BMI 56.82 kg/m²   OB Status Hysterectomy   Smoking Status Former   BSA 2.6 m²      Physical Exam  Constitutional:       Appearance: Normal " appearance.   HENT:      Head: Normocephalic and atraumatic.      Nose: Nose normal.      Mouth/Throat:      Mouth: Mucous membranes are moist.   Eyes:      Conjunctiva/sclera: Conjunctivae normal.      Pupils: Pupils are equal, round, and reactive to light.   Cardiovascular:      Rate and Rhythm: Normal rate and regular rhythm.      Pulses: Normal pulses.      Heart sounds: Normal heart sounds.   Pulmonary:      Effort: Pulmonary effort is normal.      Breath sounds: Normal breath sounds.   Musculoskeletal:         General: Normal range of motion.      Cervical back: Neck supple.   Skin:     General: Skin is warm.   Neurological:      General: No focal deficit present.      Mental Status: She is alert and oriented to person, place, and time.   Psychiatric:         Mood and Affect: Mood normal.         Behavior: Behavior normal.         Thought Content: Thought content normal.         Judgment: Judgment normal.         Assessment/Plan   Diagnoses and all orders for this visit:  Eczema, unspecified type  -     hydrocortisone 1 % ointment; Apply topically 2 times a day.  Primary hypertension  -     Comprehensive Metabolic Panel; Future  -     TSH with reflex to Free T4 if abnormal; Future  Hot flashes  -     FSH & LH; Future  EDISON (obstructive sleep apnea)  -     semaglutide, weight loss, (Wegovy) 0.25 mg/0.5 mL pen injector; Inject 0.25 mg under the skin 1 (one) time per week for 4 doses.  Health care maintenance  -     Hemoglobin A1C; Future  -     CBC  -     Lipid Panel; Future  -     BI mammo bilateral screening tomosynthesis; Future  -     Colonoscopy Screening; Average Risk Patient; Future  Class 3 severe obesity with serious comorbidity and body mass index (BMI) of 50.0 to 59.9 in adult, unspecified obesity type  -     semaglutide, weight loss, (Wegovy) 0.25 mg/0.5 mL pen injector; Inject 0.25 mg under the skin 1 (one) time per week for 4 doses.  -     Drug Screen, Urine With Reflex to Confirmation;  Future  Other orders  -     DRUG SCREEN, URINE WITH REFLEX TO CONFIRMATION  -     DRUG MONITOR, FENTANYL, W/CONF, URINE              [1]   Past Medical History:  Diagnosis Date    Hypertension     Other specified disorders of cornea, bilateral 10/23/2016    Corneal irritation, bilateral    Personal history of malignant neoplasm of cervix uteri 2014    History of cervical cancer    Sleep apnea     Unspecified acute conjunctivitis, bilateral 10/23/2016    Acute bacterial conjunctivitis of both eyes   [2]   Past Surgical History:  Procedure Laterality Date    CERVICAL BIOPSY  W/ LOOP ELECTRODE EXCISION  2014    Cervical Loop Electrosurgical Excision (LEEP)     SECTION, CLASSIC  2013     Section     SECTION, CLASSIC  2013     Section     SECTION, CLASSIC  2017     Section    CHOLECYSTECTOMY  2013    Cholecystectomy    HYSTERECTOMY      OTHER SURGICAL HISTORY  2021    Total hysterectomy with removal of both tubes and ovaries    OTHER SURGICAL HISTORY  2017    Cervix Cryosurgery    TUBAL LIGATION  2014    Tubal Ligation   [3]   Family History  Problem Relation Name Age of Onset    Breast cancer Mother      Other (mva) Father      Allergies Other      Asthma Other      Eczema Other     [4]   Allergies  Allergen Reactions    Bee Venom Protein (Honey Bee) Anaphylaxis    Latex Anaphylaxis, Hives and Other    Cyclobenzaprine Unknown     hallucinations    House Dust Unknown     Pollen and dust allergy      Causes watery eyes    Levonorgestrel-Ethinyl Estrad Other    Lisinopril Other

## 2025-05-20 LAB
ALBUMIN SERPL-MCNC: 4.4 G/DL (ref 3.6–5.1)
ALP SERPL-CCNC: 48 U/L (ref 31–125)
ALT SERPL-CCNC: 35 U/L (ref 6–29)
AMPHETAMINES UR QL: NEGATIVE NG/ML
ANION GAP SERPL CALCULATED.4IONS-SCNC: 9 MMOL/L (CALC) (ref 7–17)
AST SERPL-CCNC: 21 U/L (ref 10–35)
BARBITURATES UR QL: NEGATIVE NG/ML
BENZODIAZ UR QL: NEGATIVE NG/ML
BILIRUB SERPL-MCNC: 0.4 MG/DL (ref 0.2–1.2)
BUN SERPL-MCNC: 18 MG/DL (ref 7–25)
BZE UR QL: NEGATIVE NG/ML
CALCIUM SERPL-MCNC: 9.6 MG/DL (ref 8.6–10.2)
CHLORIDE SERPL-SCNC: 103 MMOL/L (ref 98–110)
CHOLEST SERPL-MCNC: 175 MG/DL
CHOLEST/HDLC SERPL: 3 (CALC)
CO2 SERPL-SCNC: 27 MMOL/L (ref 20–32)
CREAT SERPL-MCNC: 0.67 MG/DL (ref 0.5–0.99)
CREAT UR-MCNC: 109 MG/DL
EGFRCR SERPLBLD CKD-EPI 2021: 110 ML/MIN/1.73M2
ERYTHROCYTE [DISTWIDTH] IN BLOOD BY AUTOMATED COUNT: 13.8 % (ref 11–15)
EST. AVERAGE GLUCOSE BLD GHB EST-MCNC: 111 MG/DL
EST. AVERAGE GLUCOSE BLD GHB EST-SCNC: 6.2 MMOL/L
FENTANYL UR QL SCN: NEGATIVE NG/ML
FSH SERPL-ACNC: 32.5 MIU/ML
GLUCOSE SERPL-MCNC: 127 MG/DL (ref 65–99)
HBA1C MFR BLD: 5.5 %
HCT VFR BLD AUTO: 40 % (ref 35–45)
HDLC SERPL-MCNC: 58 MG/DL
HGB BLD-MCNC: 12 G/DL (ref 11.7–15.5)
LDLC SERPL CALC-MCNC: 93 MG/DL (CALC)
LH SERPL-ACNC: 21.5 MIU/ML
MCH RBC QN AUTO: 28.9 PG (ref 27–33)
MCHC RBC AUTO-ENTMCNC: 30 G/DL (ref 32–36)
MCV RBC AUTO: 96.4 FL (ref 80–100)
METHADONE UR QL: NEGATIVE NG/ML
NONHDLC SERPL-MCNC: 117 MG/DL (CALC)
OPIATES UR QL: NEGATIVE NG/ML
OXIDANTS UR QL: NEGATIVE MCG/ML
OXYCODONE UR QL: NEGATIVE NG/ML
PCP UR QL: NEGATIVE NG/ML
PH UR: 5.7 [PH] (ref 4.5–9)
PLATELET # BLD AUTO: 303 THOUSAND/UL (ref 140–400)
PMV BLD REES-ECKER: 10.9 FL (ref 7.5–12.5)
POTASSIUM SERPL-SCNC: 3.9 MMOL/L (ref 3.5–5.3)
PROT SERPL-MCNC: 7 G/DL (ref 6.1–8.1)
QUEST NOTES AND COMMENTS: NORMAL
RBC # BLD AUTO: 4.15 MILLION/UL (ref 3.8–5.1)
SODIUM SERPL-SCNC: 139 MMOL/L (ref 135–146)
THC UR QL: NEGATIVE NG/ML
TRIGL SERPL-MCNC: 140 MG/DL
TSH SERPL-ACNC: 3.86 MIU/L
WBC # BLD AUTO: 7.6 THOUSAND/UL (ref 3.8–10.8)

## 2025-05-27 DIAGNOSIS — I10 PRIMARY HYPERTENSION: ICD-10-CM

## 2025-05-27 DIAGNOSIS — E66.813 CLASS 3 SEVERE OBESITY WITH SERIOUS COMORBIDITY AND BODY MASS INDEX (BMI) OF 50.0 TO 59.9 IN ADULT, UNSPECIFIED OBESITY TYPE: Primary | ICD-10-CM

## 2025-05-27 DIAGNOSIS — G47.33 OSA (OBSTRUCTIVE SLEEP APNEA): ICD-10-CM

## 2025-05-27 RX ORDER — PHENTERMINE HYDROCHLORIDE 37.5 MG/1
37.5 TABLET ORAL
Qty: 30 TABLET | Refills: 0 | Status: SHIPPED | OUTPATIENT
Start: 2025-05-27 | End: 2025-06-26

## 2025-06-01 DIAGNOSIS — I10 PRIMARY HYPERTENSION: ICD-10-CM

## 2025-06-01 ASSESSMENT — ENCOUNTER SYMPTOMS
COUGH: 0
NAUSEA: 0
DYSURIA: 0
ACTIVITY CHANGE: 0
CONSTIPATION: 0
VOMITING: 0
DIZZINESS: 0
SHORTNESS OF BREATH: 0
WHEEZING: 0
ABDOMINAL PAIN: 0
WEAKNESS: 0
FEVER: 0
SPEECH DIFFICULTY: 0
NUMBNESS: 0
HEMATURIA: 0

## 2025-06-02 RX ORDER — CHLORTHALIDONE 25 MG/1
25 TABLET ORAL DAILY
Qty: 90 TABLET | Refills: 3 | Status: SHIPPED | OUTPATIENT
Start: 2025-06-02

## 2025-06-27 ENCOUNTER — TELEPHONE (OUTPATIENT)
Dept: PRIMARY CARE | Facility: CLINIC | Age: 45
End: 2025-06-27
Payer: COMMERCIAL

## 2025-06-30 DIAGNOSIS — I10 PRIMARY HYPERTENSION: ICD-10-CM

## 2025-06-30 DIAGNOSIS — E66.813 CLASS 3 SEVERE OBESITY WITH SERIOUS COMORBIDITY AND BODY MASS INDEX (BMI) OF 50.0 TO 59.9 IN ADULT, UNSPECIFIED OBESITY TYPE: ICD-10-CM

## 2025-06-30 DIAGNOSIS — G47.33 OSA (OBSTRUCTIVE SLEEP APNEA): ICD-10-CM

## 2025-06-30 RX ORDER — PHENTERMINE HYDROCHLORIDE 37.5 MG/1
37.5 TABLET ORAL
Qty: 30 TABLET | Refills: 0 | OUTPATIENT
Start: 2025-06-30 | End: 2025-07-30

## 2025-07-01 NOTE — TELEPHONE ENCOUNTER
Patient only has a couple pills left of Adipex, upset b/c she didn't know she would need an appointment. Claims she has bee calling for a refill since last week with no response until denial yesterday

## 2025-07-02 ENCOUNTER — APPOINTMENT (OUTPATIENT)
Dept: PRIMARY CARE | Facility: CLINIC | Age: 45
End: 2025-07-02
Payer: COMMERCIAL

## 2025-07-03 ENCOUNTER — OFFICE VISIT (OUTPATIENT)
Dept: PRIMARY CARE | Facility: CLINIC | Age: 45
End: 2025-07-03
Payer: COMMERCIAL

## 2025-07-03 VITALS
SYSTOLIC BLOOD PRESSURE: 100 MMHG | DIASTOLIC BLOOD PRESSURE: 66 MMHG | HEART RATE: 85 BPM | BODY MASS INDEX: 50.02 KG/M2 | OXYGEN SATURATION: 95 % | HEIGHT: 64 IN | WEIGHT: 293 LBS

## 2025-07-03 DIAGNOSIS — I10 PRIMARY HYPERTENSION: ICD-10-CM

## 2025-07-03 DIAGNOSIS — E66.813 CLASS 3 SEVERE OBESITY WITH SERIOUS COMORBIDITY AND BODY MASS INDEX (BMI) OF 50.0 TO 59.9 IN ADULT, UNSPECIFIED OBESITY TYPE: ICD-10-CM

## 2025-07-03 DIAGNOSIS — L03.039 PARONYCHIA OF GREAT TOE: Primary | ICD-10-CM

## 2025-07-03 DIAGNOSIS — G47.33 OSA (OBSTRUCTIVE SLEEP APNEA): ICD-10-CM

## 2025-07-03 PROCEDURE — 3074F SYST BP LT 130 MM HG: CPT | Performed by: STUDENT IN AN ORGANIZED HEALTH CARE EDUCATION/TRAINING PROGRAM

## 2025-07-03 PROCEDURE — 3008F BODY MASS INDEX DOCD: CPT | Performed by: STUDENT IN AN ORGANIZED HEALTH CARE EDUCATION/TRAINING PROGRAM

## 2025-07-03 PROCEDURE — 3078F DIAST BP <80 MM HG: CPT | Performed by: STUDENT IN AN ORGANIZED HEALTH CARE EDUCATION/TRAINING PROGRAM

## 2025-07-03 PROCEDURE — 99213 OFFICE O/P EST LOW 20 MIN: CPT | Performed by: STUDENT IN AN ORGANIZED HEALTH CARE EDUCATION/TRAINING PROGRAM

## 2025-07-07 DIAGNOSIS — E55.9 VITAMIN D DEFICIENCY: Primary | ICD-10-CM

## 2025-07-08 RX ORDER — CHOLECALCIFEROL (VITAMIN D3) 50 MCG
50 TABLET ORAL DAILY
Qty: 90 TABLET | Refills: 2 | Status: SHIPPED | OUTPATIENT
Start: 2025-07-08

## 2025-07-08 RX ORDER — CEPHALEXIN 500 MG/1
500 CAPSULE ORAL 4 TIMES DAILY
Qty: 20 CAPSULE | Refills: 0 | Status: SHIPPED | OUTPATIENT
Start: 2025-07-08 | End: 2025-07-13

## 2025-07-08 RX ORDER — PHENTERMINE HYDROCHLORIDE 37.5 MG/1
37.5 TABLET ORAL
Qty: 30 TABLET | Refills: 0 | Status: SHIPPED | OUTPATIENT
Start: 2025-07-08 | End: 2025-08-07

## 2025-07-21 ASSESSMENT — ENCOUNTER SYMPTOMS
CONSTIPATION: 0
SHORTNESS OF BREATH: 0
DYSURIA: 0
NAUSEA: 0
ACTIVITY CHANGE: 0
NUMBNESS: 0
WEAKNESS: 0
WHEEZING: 0
SPEECH DIFFICULTY: 0
VOMITING: 0
ABDOMINAL PAIN: 0
COUGH: 0
FEVER: 0
DIZZINESS: 0
HEMATURIA: 0

## 2025-07-21 NOTE — PROGRESS NOTES
Subjective   Patient ID: Stan Bee is a 44 y.o. female who presents for Follow-up.  GURJIT Gardner is here  for follow-up of follow-up  Has weight concerns  Also reports Paronychia and pain  right great toe      OARRS:  No data recorded  I have personally reviewed the OARRS report for Stan Bee. I have considered the risks of abuse, dependence, addiction and diversion    Is the patient prescribed a combination of a benzodiazepine and opioid?  No    Last Urine Drug Screen / ordered today: Yes  No results found for this or any previous visit (from the past 8760 hours).  N/A    Clinical rationale for not completing a Urine Drug Screen: completed and in chart      Controlled Substance Agreement:  Date of the Last Agreement: 6/6/2025  Reviewed Controlled Substance Agreement including but not limited to the benefits, risks, and alternatives to treatment with a Controlled Substance medication(s).    Anorexiants:   What is the patient's goal of therapy?  Weight loss  Is this being achieved with current treatment?  Yes    I have assessed the patient's continuing efforts to lose weight., I have assessed the patient's dedication to the treatment program and the response to treatment., and I have assessed the presence or absence of contraindications, adverse effects, and indicators of possible substance abuse that would necessitate cessation of treatment utilizing controlled substance.    Patient has demonstrated continued efforts to lose weight, is dedicated to the treatment program and the response to treatment. and I have assessed for the presence or absence of contraindications, adverse effects, and indicators of possible substance abuse that would necessitate cessation of treatment utilizing controlled substance.    Activities of Daily Living:   Is your overall impression that this patient is benefiting (symptom reduction outweighs side effects) from anorexiants therapy? No     1. Physical Functioning: Better  2.  "Family Relationship: Better  3. Social Relationship: Better  4. Mood: Better  5. Sleep Patterns: Better  6. Overall Function: Better    Medical History[1]   Surgical History[2]   Family History[3]   Allergies[4]       Occupation:     Review of Systems   Constitutional:  Negative for activity change and fever.   HENT:  Negative for congestion.    Respiratory:  Negative for cough, shortness of breath and wheezing.    Cardiovascular:  Negative for chest pain and leg swelling.   Gastrointestinal:  Negative for abdominal pain, constipation, nausea and vomiting.   Endocrine: Negative for cold intolerance.   Genitourinary:  Negative for dysuria, hematuria and urgency.   Neurological:  Negative for dizziness, speech difficulty, weakness and numbness.   Psychiatric/Behavioral:  Negative for self-injury and suicidal ideas.        Objective   Visit Vitals  /66 (BP Location: Right arm, Patient Position: Sitting, BP Cuff Size: Large adult long)   Pulse 85   Ht 1.626 m (5' 4\")   Wt 144 kg (318 lb)   SpO2 95%   BMI 54.58 kg/m²   OB Status Hysterectomy   Smoking Status Former   BSA 2.55 m²      Physical Exam  Constitutional:       Appearance: Normal appearance.   HENT:      Head: Normocephalic and atraumatic.      Nose: Nose normal.      Mouth/Throat:      Mouth: Mucous membranes are moist.     Eyes:      Conjunctiva/sclera: Conjunctivae normal.      Pupils: Pupils are equal, round, and reactive to light.       Cardiovascular:      Rate and Rhythm: Normal rate and regular rhythm.      Pulses: Normal pulses.      Heart sounds: Normal heart sounds.   Pulmonary:      Effort: Pulmonary effort is normal.      Breath sounds: Normal breath sounds.     Musculoskeletal:         General: Normal range of motion.      Cervical back: Neck supple.     Skin:     General: Skin is warm.     Neurological:      General: No focal deficit present.      Mental Status: She is alert and oriented to person, place, and time.     Psychiatric:         " Mood and Affect: Mood normal.         Behavior: Behavior normal.         Thought Content: Thought content normal.         Judgment: Judgment normal.         Assessment/Plan   Diagnoses and all orders for this visit:  Paronychia of great toe  -     cephalexin (Keflex) 500 mg capsule; Take 1 capsule (500 mg) by mouth 4 times a day for 5 days.  Class 3 severe obesity with serious comorbidity and body mass index (BMI) of 50.0 to 59.9 in adult, unspecified obesity type  -     phentermine (Adipex-P) 37.5 mg tablet; Take 1 tablet (37.5 mg) by mouth once daily in the morning. Take before meals.  EDISON (obstructive sleep apnea)  -     phentermine (Adipex-P) 37.5 mg tablet; Take 1 tablet (37.5 mg) by mouth once daily in the morning. Take before meals.  Primary hypertension  -     phentermine (Adipex-P) 37.5 mg tablet; Take 1 tablet (37.5 mg) by mouth once daily in the morning. Take before meals.              [1]   Past Medical History:  Diagnosis Date    Hypertension     Other specified disorders of cornea, bilateral 10/23/2016    Corneal irritation, bilateral    Personal history of malignant neoplasm of cervix uteri 2014    History of cervical cancer    Sleep apnea     Unspecified acute conjunctivitis, bilateral 10/23/2016    Acute bacterial conjunctivitis of both eyes   [2]   Past Surgical History:  Procedure Laterality Date    CERVICAL BIOPSY  W/ LOOP ELECTRODE EXCISION  2014    Cervical Loop Electrosurgical Excision (LEEP)     SECTION, CLASSIC  2013     Section     SECTION, CLASSIC  2013     Section     SECTION, CLASSIC  2017     Section    CHOLECYSTECTOMY  2013    Cholecystectomy    HYSTERECTOMY  2018    OTHER SURGICAL HISTORY  2021    Total hysterectomy with removal of both tubes and ovaries    OTHER SURGICAL HISTORY  2017    Cervix Cryosurgery    TUBAL LIGATION  2014    Tubal Ligation   [3]   Family History  Problem  Relation Name Age of Onset    Breast cancer Mother      Other (mva) Father      Allergies Other      Asthma Other      Eczema Other     [4]   Allergies  Allergen Reactions    Bee Venom Protein (Honey Bee) Anaphylaxis    Latex Anaphylaxis, Hives and Other    Cyclobenzaprine Unknown     hallucinations    House Dust Unknown     Pollen and dust allergy      Causes watery eyes    Levonorgestrel-Ethinyl Estrad Other    Lisinopril Other

## 2025-07-24 ENCOUNTER — OFFICE VISIT (OUTPATIENT)
Dept: PAIN MEDICINE | Facility: HOSPITAL | Age: 45
End: 2025-07-24
Payer: COMMERCIAL

## 2025-07-24 DIAGNOSIS — M54.16 LUMBAR RADICULITIS: ICD-10-CM

## 2025-07-24 DIAGNOSIS — M54.16 LUMBAR RADICULITIS: Primary | ICD-10-CM

## 2025-07-24 PROCEDURE — 99214 OFFICE O/P EST MOD 30 MIN: CPT | Performed by: ANESTHESIOLOGY

## 2025-07-24 NOTE — PROGRESS NOTES
Subjective   Patient ID: Stan Bee is a 45 y.o. female       HPI: Presents as a follow-up s/p L5/S1 interlaminar epidural steroid injection on 3/25, patient states that she had 85% relief until 3 weeks ago, where she started to have lower back pain that radiates to the right inner thigh and into the foot anteriorly, describes it as twitching, aggravated by walking and standing relieved by sitting 10 out of 10 in severity.  Associated with numbness, patient denies weakness, urine incontinence, bowel incontinence or saddle anesthesia.      Physical Therapy: The patient has done six or more weeks of physical therapy in the past six months with minimal improvement  Other Conservative Measures she has tried: Injections  Classes of medications tried in the past: Acetaminophen, NSAIDs, and Gabapentenoids        Review of Systems   13-point ROS done and negative except for HPI.     Current Outpatient Medications   Medication Instructions    acetaminophen (TYLENOL) 500 mg, Every 6 hours PRN    albuterol 90 mcg/actuation inhaler 2 puffs, Every 6 hours PRN    cetirizine (ZyrTEC) 10 mg tablet TAKE 1 TABLET BY MOUTH EVERY DAY    chlorthalidone (HYGROTON) 25 mg, oral, Daily    cholecalciferol (VITAMIN D-3) 50 mcg, oral, Daily    clobetasol (Temovate) 0.05 % cream 1 Application, 2 times daily    docusate sodium (COLACE) 100 mg, Daily    hydrocortisone 1 % cream APPLY SPARINGLY AND RUB IN WELL TO AFFECTED AREA(S) AS DIRECTED.    hydrocortisone 1 % ointment Topical, 2 times daily    ketoconazole (NIZOral) 2 % cream     losartan (COZAAR) 50 mg, oral, Daily    meloxicam (MOBIC) 15 mg, Daily    phentermine (ADIPEX-P) 37.5 mg, oral, Daily before breakfast    potassium chloride CR 10 mEq ER tablet 20 mEq, oral, Daily    Wegovy 0.25 mg, subcutaneous, Weekly       Medical History[1]     Surgical History[2]     Family History[3]     RX Allergies[4]     Objective     There were no vitals filed for this visit.     Physical  Exam  General: NAD, well groomed, well nourished  Eyes: Non-icteric sclera, EOMI  Ears, Nose, Mouth, and Throat: External ears and nose appear to be without deformity or rash. No lesions or masses noted. Hearing is grossly intact.   Neck: Trachea midline  Respiratory: Nonlabored breathing   Cardiovascular: no peripheral edema   Skin: No rashes or open lesions/ulcers identified on skin.    Back:   Palpation: No tenderness to palpation over lumbar paraspinous muscles.   Straight leg raise: positive at 45 degrees on the right   FLO positive on the right     Psychiatric: Alert, orientation to person, place, and time. Cooperative.    Imaging personally reviewed and independently interpreted.    Assessment/Plan   45-year-old female with degenerative disc disease, spondylosis, with radiating pains down the Right extremity  Previously she responded with more than 80% relief that lasted more than 4 months.       Plan:  - Schedule for L5-S1 interlaminar epidural injection.    The patient has failed treatment with : Physical therapy     We discussed  the risks, benefits and alternatives of the procedure including but not limited to: , Lack of efficacy , Transiently worsening pain , Bleeding, Infection , and Nerve Damage    Follow up: After procedure    The patient was invited to contact us back anytime with any questions or concerns and follow-up with us in the office as needed.     There are no diagnoses linked to this encounter.    The patient was given an opportunity to ask questions and were answered. The patient verbalized understanding and was agreeable to plan.    The patient was discussed and seen with Dr. Brambila.      Warren Lomas MD  Anesthesiology PGY-3  [facility]          [1]   Past Medical History:  Diagnosis Date    Hypertension     Other specified disorders of cornea, bilateral 10/23/2016    Corneal irritation, bilateral    Personal history of malignant neoplasm of cervix uteri 11/12/2014     History of cervical cancer    Sleep apnea     Unspecified acute conjunctivitis, bilateral 10/23/2016    Acute bacterial conjunctivitis of both eyes   [2]   Past Surgical History:  Procedure Laterality Date    CERVICAL BIOPSY  W/ LOOP ELECTRODE EXCISION  2014    Cervical Loop Electrosurgical Excision (LEEP)     SECTION, CLASSIC  2013     Section     SECTION, CLASSIC  2013     Section     SECTION, CLASSIC  2017     Section    CHOLECYSTECTOMY  2013    Cholecystectomy    HYSTERECTOMY  2018    OTHER SURGICAL HISTORY  2021    Total hysterectomy with removal of both tubes and ovaries    OTHER SURGICAL HISTORY  2017    Cervix Cryosurgery    TUBAL LIGATION  2014    Tubal Ligation   [3]   Family History  Problem Relation Name Age of Onset    Breast cancer Mother      Other (mva) Father      Allergies Other      Asthma Other      Eczema Other     [4]   Allergies  Allergen Reactions    Bee Venom Protein (Honey Bee) Anaphylaxis    Latex Anaphylaxis, Hives and Other    Cyclobenzaprine Unknown     hallucinations    House Dust Unknown     Pollen and dust allergy      Causes watery eyes    Levonorgestrel-Ethinyl Estrad Other    Lisinopril Other

## 2025-07-25 RX ORDER — MELOXICAM 15 MG/1
15 TABLET ORAL DAILY PRN
Qty: 30 TABLET | Refills: 1 | Status: SHIPPED | OUTPATIENT
Start: 2025-07-25

## 2025-08-01 ENCOUNTER — APPOINTMENT (OUTPATIENT)
Dept: PRIMARY CARE | Facility: CLINIC | Age: 45
End: 2025-08-01
Payer: COMMERCIAL

## 2025-08-01 VITALS
HEART RATE: 85 BPM | HEIGHT: 64 IN | OXYGEN SATURATION: 95 % | BODY MASS INDEX: 50.02 KG/M2 | WEIGHT: 293 LBS | SYSTOLIC BLOOD PRESSURE: 110 MMHG | DIASTOLIC BLOOD PRESSURE: 58 MMHG

## 2025-08-01 DIAGNOSIS — E66.813 CLASS 3 SEVERE OBESITY WITH SERIOUS COMORBIDITY AND BODY MASS INDEX (BMI) OF 50.0 TO 59.9 IN ADULT, UNSPECIFIED OBESITY TYPE: ICD-10-CM

## 2025-08-01 DIAGNOSIS — M79.89 LEG SWELLING: Primary | ICD-10-CM

## 2025-08-01 DIAGNOSIS — I10 PRIMARY HYPERTENSION: ICD-10-CM

## 2025-08-01 DIAGNOSIS — G47.33 OSA (OBSTRUCTIVE SLEEP APNEA): ICD-10-CM

## 2025-08-01 PROCEDURE — 3078F DIAST BP <80 MM HG: CPT | Performed by: STUDENT IN AN ORGANIZED HEALTH CARE EDUCATION/TRAINING PROGRAM

## 2025-08-01 PROCEDURE — 99213 OFFICE O/P EST LOW 20 MIN: CPT | Performed by: STUDENT IN AN ORGANIZED HEALTH CARE EDUCATION/TRAINING PROGRAM

## 2025-08-01 PROCEDURE — 3008F BODY MASS INDEX DOCD: CPT | Performed by: STUDENT IN AN ORGANIZED HEALTH CARE EDUCATION/TRAINING PROGRAM

## 2025-08-01 PROCEDURE — 3074F SYST BP LT 130 MM HG: CPT | Performed by: STUDENT IN AN ORGANIZED HEALTH CARE EDUCATION/TRAINING PROGRAM

## 2025-08-01 RX ORDER — PHENTERMINE HYDROCHLORIDE 37.5 MG/1
37.5 TABLET ORAL
Qty: 30 TABLET | Refills: 0 | Status: SHIPPED | OUTPATIENT
Start: 2025-08-07 | End: 2025-09-06

## 2025-08-01 RX ORDER — FUROSEMIDE 20 MG/1
10 TABLET ORAL DAILY PRN
Qty: 15 TABLET | Refills: 2 | Status: SHIPPED | OUTPATIENT
Start: 2025-08-01 | End: 2025-08-31

## 2025-08-01 ASSESSMENT — ENCOUNTER SYMPTOMS
SHORTNESS OF BREATH: 0
HEMATURIA: 0
SPEECH DIFFICULTY: 0
DYSURIA: 0
NAUSEA: 0
VOMITING: 0
COUGH: 0
WEAKNESS: 0
CONSTIPATION: 0
DIZZINESS: 0
NUMBNESS: 0
ACTIVITY CHANGE: 0
ABDOMINAL PAIN: 0
FEVER: 0
WHEEZING: 0

## 2025-08-01 NOTE — PROGRESS NOTES
Subjective   Patient ID: Stan Bee is a 45 y.o. female who presents for Follow-up.  HPI  Patient is here for a follow-up on weight loss medication  [place]AR[place]:  No data recorded  I have personally reviewed the OARRS report for Stan Bee. I have considered the risks of abuse, dependence, addiction and diversion    Is the patient prescribed a combination of a benzodiazepine and opioid?  No    Last Urine Drug Screen / ordered today: Yes    Results are as expected.         Controlled Substance Agreement:  Date of the Last Agreement: 6/6/2025  Reviewed Controlled Substance Agreement including but not limited to the benefits, risks, and alternatives to treatment with a Controlled Substance medication(s).    Anorexiants:   What is the patient's goal of therapy?  Weight loss  Is this being achieved with current treatment?  Yes    I have assessed the patient's continuing efforts to lose weight., I have assessed the patient's dedication to the treatment program and the response to treatment., and I have assessed the presence or absence of contraindications, adverse effects, and indicators of possible substance abuse that would necessitate cessation of treatment utilizing controlled substance.    Patient has demonstrated continued efforts to lose weight, is dedicated to the treatment program and the response to treatment. and I have assessed for the presence or absence of contraindications, adverse effects, and indicators of possible substance abuse that would necessitate cessation of treatment utilizing controlled substance.    Activities of Daily Living:   Is your overall impression that this patient is benefiting (symptom reduction outweighs side effects) from anorexiants therapy? Yes     1. Physical Functioning: Better  2. Family Relationship: Better  3. Social Relationship: Better  4. Mood: Better  5. Sleep Patterns: Better  6. Overall Function: Better    Medical History[1]   Surgical History[2]   Family History[3]  "  Allergies[4]       Occupation:     Review of Systems   Constitutional:  Negative for activity change and fever.   HENT:  Negative for congestion.    Respiratory:  Negative for cough, shortness of breath and wheezing.    Cardiovascular:  Negative for chest pain and leg swelling.   Gastrointestinal:  Negative for abdominal pain, constipation, nausea and vomiting.   Endocrine: Negative for cold intolerance.   Genitourinary:  Negative for dysuria, hematuria and urgency.   Neurological:  Negative for dizziness, speech difficulty, weakness and numbness.   Psychiatric/Behavioral:  Negative for self-injury and suicidal ideas.        Objective   Visit Vitals  /58 (BP Location: Right arm, Patient Position: Sitting, BP Cuff Size: Large adult long)   Pulse 85   Ht 1.626 m (5' 4\")   Wt 143 kg (315 lb)   SpO2 95%   BMI 54.07 kg/m²   OB Status Hysterectomy   Smoking Status Former   BSA 2.54 m²      Physical Exam  Constitutional:       Appearance: Normal appearance.   HENT:      Head: Normocephalic and atraumatic.      Nose: Nose normal.      Mouth/Throat:      Mouth: Mucous membranes are moist.     Eyes:      Conjunctiva/sclera: Conjunctivae normal.      Pupils: Pupils are equal, round, and reactive to light.       Cardiovascular:      Rate and Rhythm: Normal rate and regular rhythm.      Pulses: Normal pulses.      Heart sounds: Normal heart sounds.   Pulmonary:      Effort: Pulmonary effort is normal.      Breath sounds: Normal breath sounds.     Musculoskeletal:         General: Normal range of motion.      Cervical back: Neck supple.     Skin:     General: Skin is warm.     Neurological:      General: No focal deficit present.      Mental Status: She is alert and oriented to person, place, and time.     Psychiatric:         Mood and Affect: Mood normal.         Behavior: Behavior normal.         Thought Content: Thought content normal.         Judgment: Judgment normal.         Assessment/Plan   Diagnoses and all " orders for this visit:  Leg swelling  -     furosemide (Lasix) 20 mg tablet; Take 0.5 tablets (10 mg) by mouth once daily as needed (leg swelling).  Class 3 severe obesity with serious comorbidity and body mass index (BMI) of 50.0 to 59.9 in adult, unspecified obesity type  -     phentermine (Adipex-P) 37.5 mg tablet; Take 1 tablet (37.5 mg) by mouth once daily in the morning. Take before meals. Do not fill before 2025.  EDISON (obstructive sleep apnea)  -     phentermine (Adipex-P) 37.5 mg tablet; Take 1 tablet (37.5 mg) by mouth once daily in the morning. Take before meals. Do not fill before 2025.  Primary hypertension  -     phentermine (Adipex-P) 37.5 mg tablet; Take 1 tablet (37.5 mg) by mouth once daily in the morning. Take before meals. Do not fill before 2025.  Is concerned about bilateral lower limb swelling.  Chronic in nature.  Examination shows 1+ pitting edema.  Possibly a combination of chronic lymphedema with dependent edema.  Advised to use Lasix as needed     Third prescription for phentermine  331/318/315 [weights 5/15, 7/3, today respectively]         [1]   Past Medical History:  Diagnosis Date    Hypertension     Other specified disorders of cornea, bilateral 10/23/2016    Corneal irritation, bilateral    Personal history of malignant neoplasm of cervix uteri 2014    History of cervical cancer    Sleep apnea     Unspecified acute conjunctivitis, bilateral 10/23/2016    Acute bacterial conjunctivitis of both eyes   [2]   Past Surgical History:  Procedure Laterality Date    CERVICAL BIOPSY  W/ LOOP ELECTRODE EXCISION  2014    Cervical Loop Electrosurgical Excision (LEEP)     SECTION, CLASSIC  2013     Section     SECTION, CLASSIC  2013     Section     SECTION, CLASSIC  2017     Section    CHOLECYSTECTOMY  2013    Cholecystectomy    HYSTERECTOMY  2018    OTHER SURGICAL HISTORY   08/05/2021    Total hysterectomy with removal of both tubes and ovaries    OTHER SURGICAL HISTORY  06/19/2017    Cervix Cryosurgery    TUBAL LIGATION  11/12/2014    Tubal Ligation   [3]   Family History  Problem Relation Name Age of Onset    Breast cancer Mother      Other (mva) Father      Allergies Other      Asthma Other      Eczema Other     [4]   Allergies  Allergen Reactions    Bee Venom Protein (Honey Bee) Anaphylaxis    Latex Anaphylaxis, Hives and Other    Cyclobenzaprine Unknown     hallucinations    House Dust Unknown     Pollen and dust allergy      Causes watery eyes    Levonorgestrel-Ethinyl Estrad Other    Lisinopril Other

## 2025-08-07 NOTE — H&P
Pain Management H&P    History Of Present Illness  Stan Bee is a 45 y.o. female presents for procedure stated below. Endorses no changes in past medical history or medical health since last seen in clinic.      Past Medical History  She has a past medical history of Hypertension, Other specified disorders of cornea, bilateral (10/23/2016), Personal history of malignant neoplasm of cervix uteri (2014), Sleep apnea, and Unspecified acute conjunctivitis, bilateral (10/23/2016).    Surgical History  She has a past surgical history that includes Cholecystectomy (2013);  section, classic (2013);  section, classic (2013); Other surgical history (2021); Cervical biopsy w/ loop electrode excision (2014); Tubal ligation (2014);  section, classic (2017); Other surgical history (2017); and Hysterectomy (2018).     Social History  She reports that she quit smoking about 9 years ago. Her smoking use included cigarettes. She has never used smokeless tobacco. She reports current alcohol use. She reports that she does not use drugs.    Family History  Family History[1]     Allergies  Bee venom protein (honey bee), Latex, Cyclobenzaprine, House dust, Levonorgestrel-ethinyl estrad, and Lisinopril    Review of Symptoms:   Constitutional: Negative for chills, diaphoresis or fever  HENT: Negative for neck swelling  Eyes:.  Negative for eye pain  Respiratory:.  Negative for cough, shortness of breath or wheezing    Cardiovascular:.  Negative for chest pain or palpitations  Gastrointestinal:.  Negative for abdominal pain, nausea and vomiting  Genitourinary:.  Negative for urgency  Musculoskeletal:  Positive for back pain. Positive for joint pain. Denies falls within the past 3 months.  Skin: Negative for wounds or itching   Neurological: Negative for dizziness, seizures, loss of consciousness and weakness  Endo/Heme/Allergies: Does not bruise/bleed  easily  Psychiatric/Behavioral: Negative for depression. The patient does not appear anxious.      Pre-sedation Evaluation  ASA class 2  Mallampati score 2     PHYSICAL EXAM  Vitals signs reviewed  Constitutional:       General: Not in acute distress     Appearance: Normal appearance. Not ill-appearing.  HENT:     Head: Normocephalic and atraumatic  Eyes:     Conjunctiva/sclera: Conjunctivae normal  Cardiovascular:     Rate and Rhythm: Normal rate and regular rhythm  Pulmonary:     Effort: No respiratory distress  Abdominal:     Palpations: Abdomen is soft  Musculoskeletal: PORRAS  Skin:     General: Skin is warm and dry  Neurological:     General: No focal deficit present  Psychiatric:         Mood and Affect: Mood normal         Behavior: Behavior normal     Last Recorded Vitals  There were no vitals taken for this visit.    Relevant Results  Current Outpatient Medications   Medication Instructions    acetaminophen (TYLENOL) 500 mg, Every 6 hours PRN    albuterol 90 mcg/actuation inhaler 2 puffs, Every 6 hours PRN    cetirizine (ZyrTEC) 10 mg tablet TAKE 1 TABLET BY MOUTH EVERY DAY    chlorthalidone (HYGROTON) 25 mg, oral, Daily    cholecalciferol (VITAMIN D-3) 50 mcg, oral, Daily    clobetasol (Temovate) 0.05 % cream 1 Application, 2 times daily    docusate sodium (COLACE) 100 mg, Daily    furosemide (LASIX) 10 mg, oral, Daily PRN    hydrocortisone 1 % cream APPLY SPARINGLY AND RUB IN WELL TO AFFECTED AREA(S) AS DIRECTED.    hydrocortisone 1 % ointment Topical, 2 times daily    ketoconazole (NIZOral) 2 % cream     losartan (COZAAR) 50 mg, oral, Daily    meloxicam (MOBIC) 15 mg, oral, Daily PRN    phentermine (ADIPEX-P) 37.5 mg, oral, Daily before breakfast    potassium chloride CR 10 mEq ER tablet 20 mEq, oral, Daily    Wegovy 0.25 mg, subcutaneous, Weekly         MR lumbar spine wo IV contrast 03/15/2024    Narrative  Interpreted By:  Vijaya Moody,  STUDY:  MRI of the lumbar spine without IV contrast;  3/15/2024  10:17 am    INDICATION:  Signs/Symptoms:pain.    COMPARISON:  10/18/2021.    ACCESSION NUMBER(S):  [id number]    ORDERING CLINICIAN:  LUCIAN STEVENS    TECHNIQUE:  Sagittal and axial STIR and T1-weighted MRI images of the lumbar  spine were acquired using a spondylolysis protocol.  No contrast was  administered.    FINDINGS:  For counting purposes the last lumbarized vertebral body is labeled  L5.    Alignment, Vertebral body heights and marrow signal pattern are  within normal limits.    Minimal disc height loss at T9-T10 T10-T11. Intervertebral disc  spaces are otherwise maintained.    The conus terminates at L1-L2 and is unremarkable.    Prevertebral soft tissues are not thickened.    Evaluation by level:    There are degenerative changes in the lower thoracic spine without  significant spinal canal stenosis. At least mild neural foraminal  narrowing at T10-T11. No axial images were performed through this  level.    T11-T12: No spinal canal or neural foraminal stenosis    T12-L1: No spinal canal or neural foraminal stenosis    L1-L2: No spinal canal or neural foraminal stenosis    L2-L3: Minimal right eccentric disc bulge and facet arthrosis. No  spinal canal or neural foraminal stenosis    L3-L4: No spinal canal or neural foraminal stenosis.    L4-L5: Mild disc bulge and facet arthrosis. No spinal canal stenosis.  Mild neural foraminal narrowing.    L5-S1: Mild disc bulge and facet arthrosis. No spinal canal stenosis.  Mild neural foraminal stenosis.    Impression  Mild multilevel degenerative changes without significant spinal canal  stenosis. Mild neural foraminal narrowing at L4-L5 and L5-S1.    There are degenerative changes in the lower thoracic spine without  significant spinal canal stenosis. At least mild neural foraminal  narrowing at T10-T11. No axial images were performed through this  level.    I personally reviewed the images/study and I agree with the findings  as stated. This study was  interpreted at [facility], [place], [place].    MACRO:  None    Signed by: Vijaya Moody 3/15/2024 10:48 AM  Dictation workstation:   [id number]       ASSESSMENT/PLAN  Stan Bee is a 45 y.o. female here for L5/S1 ILESI with fluoroscopic guidance    Patient denies any recent antibiotic use or infections, denies any blood thinner use, and denies contrast or local anesthetic allergies     Risks, benefits, alternatives discussed. All questions answered to the best of my ability. Patient agrees to proceed.      Our plan is as follows:  - Proceed with aforementioned procedure       Ismael Briceno, DO  Chronic Pain Management Fellow         [1]   Family History  Problem Relation Name Age of Onset    Breast cancer Mother      Other (mva) Father      Allergies Other      Asthma Other      Eczema Other

## 2025-08-08 ENCOUNTER — HOSPITAL ENCOUNTER (OUTPATIENT)
Facility: HOSPITAL | Age: 45
Discharge: HOME | End: 2025-08-08
Payer: COMMERCIAL

## 2025-08-08 VITALS
RESPIRATION RATE: 16 BRPM | TEMPERATURE: 97.9 F | DIASTOLIC BLOOD PRESSURE: 79 MMHG | OXYGEN SATURATION: 99 % | WEIGHT: 293 LBS | BODY MASS INDEX: 53.21 KG/M2 | SYSTOLIC BLOOD PRESSURE: 116 MMHG | HEART RATE: 90 BPM

## 2025-08-08 DIAGNOSIS — M54.16 LUMBAR RADICULITIS: ICD-10-CM

## 2025-08-08 PROCEDURE — 62323 NJX INTERLAMINAR LMBR/SAC: CPT | Performed by: ANESTHESIOLOGY

## 2025-08-08 PROCEDURE — 7100000010 HC PHASE TWO TIME - EACH INCREMENTAL 1 MINUTE

## 2025-08-08 PROCEDURE — 7100000009 HC PHASE TWO TIME - INITIAL BASE CHARGE

## 2025-08-08 PROCEDURE — 2550000001 HC RX 255 CONTRASTS: Mod: JW | Performed by: ANESTHESIOLOGY

## 2025-08-08 PROCEDURE — 2500000004 HC RX 250 GENERAL PHARMACY W/ HCPCS (ALT 636 FOR OP/ED): Mod: JZ | Performed by: ANESTHESIOLOGY

## 2025-08-08 RX ORDER — MIDAZOLAM HYDROCHLORIDE 1 MG/ML
INJECTION, SOLUTION INTRAMUSCULAR; INTRAVENOUS
Status: COMPLETED | OUTPATIENT
Start: 2025-08-08 | End: 2025-08-08

## 2025-08-08 RX ORDER — METHYLPREDNISOLONE ACETATE 40 MG/ML
INJECTION, SUSPENSION INTRA-ARTICULAR; INTRALESIONAL; INTRAMUSCULAR; SOFT TISSUE
Status: COMPLETED | OUTPATIENT
Start: 2025-08-08 | End: 2025-08-08

## 2025-08-08 RX ORDER — LIDOCAINE HYDROCHLORIDE 5 MG/ML
INJECTION, SOLUTION INFILTRATION; INTRAVENOUS
Status: COMPLETED | OUTPATIENT
Start: 2025-08-08 | End: 2025-08-08

## 2025-08-08 RX ADMIN — LIDOCAINE HYDROCHLORIDE 10 ML: 5 INJECTION, SOLUTION INFILTRATION at 12:04

## 2025-08-08 RX ADMIN — IOHEXOL 2 ML: 240 INJECTION, SOLUTION INTRATHECAL; INTRAVASCULAR; INTRAVENOUS; ORAL at 12:05

## 2025-08-08 RX ADMIN — METHYLPREDNISOLONE ACETATE 40 MG: 40 INJECTION, SUSPENSION INTRA-ARTICULAR; INTRALESIONAL; INTRAMUSCULAR; SOFT TISSUE at 12:05

## 2025-08-08 RX ADMIN — MIDAZOLAM 2 MG: 1 INJECTION INTRAMUSCULAR; INTRAVENOUS at 11:58

## 2025-08-08 ASSESSMENT — PAIN SCALES - GENERAL: PAINLEVEL_OUTOF10: 0 - NO PAIN

## 2025-08-08 ASSESSMENT — PAIN - FUNCTIONAL ASSESSMENT
PAIN_FUNCTIONAL_ASSESSMENT: 0-10
PAIN_FUNCTIONAL_ASSESSMENT: 0-10
PAIN_FUNCTIONAL_ASSESSMENT: WONG-BAKER FACES
PAIN_FUNCTIONAL_ASSESSMENT: WONG-BAKER FACES

## 2025-08-08 ASSESSMENT — PAIN SCALES - WONG BAKER
WONGBAKER_NUMERICALRESPONSE: NO HURT
WONGBAKER_NUMERICALRESPONSE: HURTS LITTLE MORE
WONGBAKER_NUMERICALRESPONSE: HURTS EVEN MORE

## 2025-08-08 NOTE — DISCHARGE INSTRUCTIONS
DISCHARGE INSTRUCTIONS FOR INJECTIONS     After most injections, it is recommended that you relax and limit your activity for the remainder of the day unless you have been told otherwise by your pain physician.  You should not drive a car, operate machinery, or make important legal decisions unless otherwise directed by your pain physician.  You may resume your normal activity, including exercise, tomorrow.      Keep a written pain diary of how much pain relief you experienced following the injection procedure and the length of time of pain relief you experienced pain relief. Following diagnostic injections like medial branch nerve blocks, sacroiliac joint blocks, stellate ganglion injections and other blocks, it is very important you record the specific amount of pain relief you experienced immediately after the injectionand how long it lasted. Your doctor will ask you for this information at your follow up visit.     For all injections, please keep the injection site dry and inspect the site for a couple of days. You may remove the Band-Aid the day of the injection at any time.     Some discomfort, bruising or slight swelling may occur at the injection site. This is not abnormal if it occurs.  If needed you may:    -Take over the counter medication such as Tylenol or Motrin.   -Apply an ice pack for 30 minutes, 2 to 3 times a day for the first 24 hours.     You may shower today; no soaking baths, hot tubs, whirlpools or swimming pools for two days.      If you are given steroids in your injection, it may take 3-5 days for the steroid medication to take effect. You may notice a worsening of your symptoms for 1-2 days after the injection. This is not abnormal.  You may use acetaminophen, ibuprofen, or prescription medication that your doctor may have prescribed for you if you need to do so.     A few common side effects of steroids include facial flushing, sweating, restlessness, irritability,difficulty sleeping,  increase in blood sugar, and increased blood pressure. If you have diabetes, please monitor your blood sugar at least once a day for at least 5 days. If you have poorly controlled high blood pressure, monitoryour blood pressure for at least 2 days and contact your primary care physician if these numbers are unusually high for you.      If you take aspirin or non-steroidal anti-inflammatory drugs (examples are Motrin, Advil, ibuprofen, Naprosyn, Voltaren, Relafen, etc.) you may restart these this evening, but stop taking it 3 days before your next appointment, unless instructed otherwiseby your physician.      You do not need to discontinue non-aspirin-containing pain medications prior to an injection (examples: Celebrex, tramadol, hydrocodone and acetaminophen).      If you take a blood thinning medication (Coumadin, Lovenox, Fragmin,Ticlid, Plavix, Pradaxa, etc.), please discuss this with your primary care physician/cardiologist and your pain physician. These medications MUST be discontinued before you can have an injection safely, without the risk of uncontrolled bleeding. If these medications are not discontinued for an appropriate period of time, you will not be able to receivean injection. Please adhere to instructions given to you about when to restart your blood thinning medication. If you have any questions please reach out to our team.    If you are taking Coumadin, please have your INR checked the morning of your procedure and bring the result to your appointment unless otherwise instructed. If your INR is over 1.2, your injection may need to be rescheduled to avoid uncontrolled bleeding from the needle placement.     Call [facility]  and ask for Pain Management at [phone] between 8am-4pm Monday - Friday if you are experiencing the following:    If you received an epidural or spinal injection:    -Headache that doesnot go away with medicine, is worse when sitting or standing up, and is greatly  relieved upon lying down.   -Severe pain worse than or different than your baseline pain.   -Chills or fever (101º F or greater).   -Drainage or signs of infection at the injection site     Go directly to the Emergency Department if you are experiencing the following and received an epidural or spinal injection:   -Abrupt weakness or progressive weakness in your legs that starts after you leave the clinic.   -Abrupt severe or worsening numbness in your legs.   -Inability to urinate after the injection or loss of bowel or bladder control without the urge to defecate or urinate.     If you have a clinical question that cannot wait until your next appointment, please call [phone] between 8am-4pm Monday - Friday or send a [employer] message. We do our best to return all non-emergency messages within 24 hours, Monday - Friday. A nurse or physician will return your message. You may also try calling and they will do their best to answer your question(s):   - Dr. Shaka Sal's nurse ([phone])  - Dr. Kelly Segal/Dr. Ashraf's nurse ([phone])  - Dr. Maegan Delgadillo/John's nurse ([phone])     If you need to cancel an appointment, please call the scheduling staff at [phone] during normal business hours or leave a message at least 24 hours in advance.     If you are going to be sedated for your next procedure, you MUST have responsible adult who can legally drive accompany you home. You cannot eat or drink for at least eight hours prior to the planned procedure if you are going to receive sedation. You may take your non-blood thinning medications with a small sip of water.

## (undated) DEVICE — Device

## (undated) DEVICE — SLEEVE, VASO PRESS, CALF GARMENT, MEDIUM, GREEN

## (undated) DEVICE — GLOVE, SURGICAL, PROTEXIS PI ORTHO, 7.5, PF, LF

## (undated) DEVICE — BANDAGE, ELASTIC, PREMIUM, SELF-CLOSE, 4 IN X 5.5 YD, STERILE

## (undated) DEVICE — INSTRUMENT, CENTERLINE ENDO SOFT TISSUE RELEASE